# Patient Record
Sex: FEMALE | Race: WHITE | Employment: OTHER | URBAN - METROPOLITAN AREA
[De-identification: names, ages, dates, MRNs, and addresses within clinical notes are randomized per-mention and may not be internally consistent; named-entity substitution may affect disease eponyms.]

---

## 2017-01-11 ENCOUNTER — HOSPITAL ENCOUNTER (OUTPATIENT)
Dept: WOMENS IMAGING | Age: 62
Discharge: HOME | End: 2017-01-11
Payer: MEDICARE

## 2017-01-11 DIAGNOSIS — Z12.31 SCREENING MAMMOGRAM, ENCOUNTER FOR: ICD-10-CM

## 2017-01-11 PROCEDURE — G0202 SCR MAMMO BI INCL CAD: HCPCS

## 2017-02-14 ENCOUNTER — OFFICE VISIT (OUTPATIENT)
Dept: PRIMARY CARE CLINIC | Age: 62
End: 2017-02-14

## 2017-02-14 VITALS
WEIGHT: 211 LBS | BODY MASS INDEX: 37.39 KG/M2 | RESPIRATION RATE: 16 BRPM | HEART RATE: 93 BPM | SYSTOLIC BLOOD PRESSURE: 120 MMHG | OXYGEN SATURATION: 95 % | TEMPERATURE: 97.6 F | HEIGHT: 63 IN | DIASTOLIC BLOOD PRESSURE: 78 MMHG

## 2017-02-14 DIAGNOSIS — M47.816 SPONDYLOSIS OF LUMBAR REGION WITHOUT MYELOPATHY OR RADICULOPATHY: ICD-10-CM

## 2017-02-14 DIAGNOSIS — M54.50 LOW BACK PAIN RADIATING TO RIGHT LEG: ICD-10-CM

## 2017-02-14 DIAGNOSIS — M79.604 LOW BACK PAIN RADIATING TO RIGHT LEG: ICD-10-CM

## 2017-02-14 DIAGNOSIS — J41.0 SIMPLE CHRONIC BRONCHITIS (HCC): ICD-10-CM

## 2017-02-14 DIAGNOSIS — H61.21 IMPACTED CERUMEN OF RIGHT EAR: Primary | ICD-10-CM

## 2017-02-14 PROCEDURE — G8926 SPIRO NO PERF OR DOC: HCPCS | Performed by: INTERNAL MEDICINE

## 2017-02-14 PROCEDURE — G8599 NO ASA/ANTIPLAT THER USE RNG: HCPCS | Performed by: INTERNAL MEDICINE

## 2017-02-14 PROCEDURE — G8484 FLU IMMUNIZE NO ADMIN: HCPCS | Performed by: INTERNAL MEDICINE

## 2017-02-14 PROCEDURE — 99214 OFFICE O/P EST MOD 30 MIN: CPT | Performed by: INTERNAL MEDICINE

## 2017-02-14 PROCEDURE — G8417 CALC BMI ABV UP PARAM F/U: HCPCS | Performed by: INTERNAL MEDICINE

## 2017-02-14 PROCEDURE — 3017F COLORECTAL CA SCREEN DOC REV: CPT | Performed by: INTERNAL MEDICINE

## 2017-02-14 PROCEDURE — 4004F PT TOBACCO SCREEN RCVD TLK: CPT | Performed by: INTERNAL MEDICINE

## 2017-02-14 PROCEDURE — G8427 DOCREV CUR MEDS BY ELIG CLIN: HCPCS | Performed by: INTERNAL MEDICINE

## 2017-02-14 PROCEDURE — 3023F SPIROM DOC REV: CPT | Performed by: INTERNAL MEDICINE

## 2017-02-14 PROCEDURE — 3014F SCREEN MAMMO DOC REV: CPT | Performed by: INTERNAL MEDICINE

## 2017-02-14 RX ORDER — OXYCODONE AND ACETAMINOPHEN 7.5; 325 MG/1; MG/1
1 TABLET ORAL 2 TIMES DAILY PRN
Qty: 40 TABLET | Refills: 0 | Status: SHIPPED | OUTPATIENT
Start: 2017-02-14 | End: 2018-09-27 | Stop reason: ALTCHOICE

## 2017-02-14 ASSESSMENT — PATIENT HEALTH QUESTIONNAIRE - PHQ9
1. LITTLE INTEREST OR PLEASURE IN DOING THINGS: 0
2. FEELING DOWN, DEPRESSED OR HOPELESS: 0
SUM OF ALL RESPONSES TO PHQ9 QUESTIONS 1 & 2: 0
SUM OF ALL RESPONSES TO PHQ QUESTIONS 1-9: 0

## 2017-02-14 ASSESSMENT — ENCOUNTER SYMPTOMS
BLOOD IN STOOL: 0
PHOTOPHOBIA: 0
APNEA: 0
BACK PAIN: 1
ABDOMINAL DISTENTION: 0
ABDOMINAL PAIN: 0
CHOKING: 0
FACIAL SWELLING: 0

## 2017-03-13 ENCOUNTER — TELEPHONE (OUTPATIENT)
Dept: INTERNAL MEDICINE | Age: 62
End: 2017-03-13

## 2017-03-13 RX ORDER — AZITHROMYCIN 250 MG/1
TABLET, FILM COATED ORAL
Qty: 1 PACKET | Refills: 0 | Status: SHIPPED | OUTPATIENT
Start: 2017-03-13 | End: 2017-03-23

## 2017-03-17 ENCOUNTER — TELEPHONE (OUTPATIENT)
Dept: PRIMARY CARE CLINIC | Age: 62
End: 2017-03-17

## 2017-03-17 RX ORDER — GUAIFENESIN DEXTROMETHORPHAN HYDROBROMIDE ORAL SOLUTION 10; 100 MG/5ML; MG/5ML
10 SOLUTION ORAL EVERY 4 HOURS PRN
Qty: 240 ML | Refills: 0 | Status: SHIPPED | OUTPATIENT
Start: 2017-03-17 | End: 2017-09-11 | Stop reason: CLARIF

## 2017-06-08 ENCOUNTER — HOSPITAL ENCOUNTER (OUTPATIENT)
Dept: GENERAL RADIOLOGY | Age: 62
Discharge: HOME | End: 2017-06-08
Payer: MEDICARE

## 2017-06-08 ENCOUNTER — HOSPITAL ENCOUNTER (OUTPATIENT)
Dept: MRI IMAGING | Age: 62
Discharge: HOME | End: 2017-06-08
Payer: MEDICARE

## 2017-06-08 DIAGNOSIS — I10 ESSENTIAL HYPERTENSION: ICD-10-CM

## 2017-06-08 DIAGNOSIS — R52 PAIN: ICD-10-CM

## 2017-06-08 PROCEDURE — 72050 X-RAY EXAM NECK SPINE 4/5VWS: CPT

## 2017-06-08 PROCEDURE — 72148 MRI LUMBAR SPINE W/O DYE: CPT

## 2017-06-08 RX ORDER — LISINOPRIL 5 MG/1
TABLET ORAL
Qty: 90 TABLET | Refills: 1 | Status: SHIPPED | OUTPATIENT
Start: 2017-06-08 | End: 2017-11-08 | Stop reason: SDUPTHER

## 2017-07-05 DIAGNOSIS — E11.9 CONTROLLED TYPE 2 DIABETES MELLITUS WITHOUT COMPLICATION, WITH LONG-TERM CURRENT USE OF INSULIN (HCC): ICD-10-CM

## 2017-07-05 DIAGNOSIS — Z79.4 CONTROLLED TYPE 2 DIABETES MELLITUS WITHOUT COMPLICATION, WITH LONG-TERM CURRENT USE OF INSULIN (HCC): ICD-10-CM

## 2017-08-15 ENCOUNTER — CARE COORDINATION (OUTPATIENT)
Dept: CARE COORDINATION | Age: 62
End: 2017-08-15

## 2017-08-16 ENCOUNTER — CARE COORDINATION (OUTPATIENT)
Dept: CARE COORDINATION | Age: 62
End: 2017-08-16

## 2017-08-17 ENCOUNTER — CARE COORDINATION (OUTPATIENT)
Dept: CARE COORDINATION | Age: 62
End: 2017-08-17

## 2017-08-22 ENCOUNTER — CARE COORDINATION (OUTPATIENT)
Dept: CARE COORDINATION | Age: 62
End: 2017-08-22

## 2017-08-22 ASSESSMENT — ENCOUNTER SYMPTOMS: DYSPNEA ASSOCIATED WITH: EXERTION

## 2017-09-06 ENCOUNTER — CARE COORDINATION (OUTPATIENT)
Dept: CARE COORDINATION | Age: 62
End: 2017-09-06

## 2017-09-11 ENCOUNTER — CARE COORDINATOR VISIT (OUTPATIENT)
Dept: CARE COORDINATION | Age: 62
End: 2017-09-11

## 2017-09-11 ENCOUNTER — OFFICE VISIT (OUTPATIENT)
Dept: PRIMARY CARE CLINIC | Age: 62
End: 2017-09-11

## 2017-09-11 VITALS
RESPIRATION RATE: 18 BRPM | SYSTOLIC BLOOD PRESSURE: 138 MMHG | HEIGHT: 63 IN | TEMPERATURE: 97.3 F | WEIGHT: 218.6 LBS | DIASTOLIC BLOOD PRESSURE: 76 MMHG | OXYGEN SATURATION: 88 % | HEART RATE: 104 BPM | BODY MASS INDEX: 38.73 KG/M2

## 2017-09-11 DIAGNOSIS — E78.00 PURE HYPERCHOLESTEROLEMIA: ICD-10-CM

## 2017-09-11 DIAGNOSIS — Z11.59 ENCOUNTER FOR HEPATITIS C SCREENING TEST FOR LOW RISK PATIENT: ICD-10-CM

## 2017-09-11 DIAGNOSIS — E11.9 CONTROLLED TYPE 2 DIABETES MELLITUS WITHOUT COMPLICATION, WITH LONG-TERM CURRENT USE OF INSULIN (HCC): ICD-10-CM

## 2017-09-11 DIAGNOSIS — E11.9 TYPE 2 DIABETES MELLITUS WITHOUT COMPLICATION, WITHOUT LONG-TERM CURRENT USE OF INSULIN (HCC): ICD-10-CM

## 2017-09-11 DIAGNOSIS — E53.8 VITAMIN B 12 DEFICIENCY: ICD-10-CM

## 2017-09-11 DIAGNOSIS — K21.9 GASTROESOPHAGEAL REFLUX DISEASE WITHOUT ESOPHAGITIS: ICD-10-CM

## 2017-09-11 DIAGNOSIS — R42 DIZZY: ICD-10-CM

## 2017-09-11 DIAGNOSIS — E11.9 TYPE 2 DIABETES MELLITUS WITHOUT COMPLICATION, WITHOUT LONG-TERM CURRENT USE OF INSULIN (HCC): Primary | ICD-10-CM

## 2017-09-11 DIAGNOSIS — E03.9 HYPOTHYROIDISM, UNSPECIFIED TYPE: ICD-10-CM

## 2017-09-11 DIAGNOSIS — R00.2 PALPITATIONS: ICD-10-CM

## 2017-09-11 DIAGNOSIS — I65.23 CAROTID STENOSIS, BILATERAL: ICD-10-CM

## 2017-09-11 DIAGNOSIS — Z79.4 CONTROLLED TYPE 2 DIABETES MELLITUS WITHOUT COMPLICATION, WITH LONG-TERM CURRENT USE OF INSULIN (HCC): ICD-10-CM

## 2017-09-11 DIAGNOSIS — Z11.4 SCREENING FOR HIV (HUMAN IMMUNODEFICIENCY VIRUS): ICD-10-CM

## 2017-09-11 LAB
ALBUMIN SERPL-MCNC: 4 G/DL (ref 3.9–4.9)
ALP BLD-CCNC: 79 U/L (ref 40–130)
ALT SERPL-CCNC: 29 U/L (ref 0–33)
ANION GAP SERPL CALCULATED.3IONS-SCNC: 15 MEQ/L (ref 7–13)
AST SERPL-CCNC: 20 U/L (ref 0–35)
BASOPHILS ABSOLUTE: 0.1 K/UL (ref 0–0.2)
BASOPHILS RELATIVE PERCENT: 1 %
BILIRUB SERPL-MCNC: 0.1 MG/DL (ref 0–1.2)
BUN BLDV-MCNC: 15 MG/DL (ref 8–23)
CALCIUM SERPL-MCNC: 9.2 MG/DL (ref 8.6–10.2)
CHLORIDE BLD-SCNC: 98 MEQ/L (ref 98–107)
CHOLESTEROL, TOTAL: 306 MG/DL (ref 0–199)
CO2: 28 MEQ/L (ref 22–29)
CREAT SERPL-MCNC: 0.72 MG/DL (ref 0.5–0.9)
CREATININE URINE: 72.5 MG/DL
EOSINOPHILS ABSOLUTE: 0.1 K/UL (ref 0–0.7)
EOSINOPHILS RELATIVE PERCENT: 1.5 %
GFR AFRICAN AMERICAN: >60
GFR NON-AFRICAN AMERICAN: >60
GLOBULIN: 3.1 G/DL (ref 2.3–3.5)
GLUCOSE BLD-MCNC: 252 MG/DL (ref 74–109)
HBA1C MFR BLD: 9.1 % (ref 4.8–5.9)
HCT VFR BLD CALC: 50 % (ref 37–47)
HDLC SERPL-MCNC: 48 MG/DL (ref 40–59)
HEMOGLOBIN: 16.2 G/DL (ref 12–16)
LDL CHOLESTEROL CALCULATED: 185 MG/DL (ref 0–129)
LYMPHOCYTES ABSOLUTE: 3.3 K/UL (ref 1–4.8)
LYMPHOCYTES RELATIVE PERCENT: 39 %
MCH RBC QN AUTO: 31.1 PG (ref 27–31.3)
MCHC RBC AUTO-ENTMCNC: 32.3 % (ref 33–37)
MCV RBC AUTO: 96.2 FL (ref 82–100)
MICROALBUMIN UR-MCNC: 1.8 MG/DL
MICROALBUMIN/CREAT UR-RTO: 24.8 MG/G (ref 0–30)
MONOCYTES ABSOLUTE: 0.3 K/UL (ref 0.2–0.8)
MONOCYTES RELATIVE PERCENT: 3.9 %
NEUTROPHILS ABSOLUTE: 4.6 K/UL (ref 1.4–6.5)
NEUTROPHILS RELATIVE PERCENT: 54.6 %
PDW BLD-RTO: 13.6 % (ref 11.5–14.5)
PLATELET # BLD: 230 K/UL (ref 130–400)
POTASSIUM SERPL-SCNC: 4.7 MEQ/L (ref 3.5–5.1)
RBC # BLD: 5.2 M/UL (ref 4.2–5.4)
SODIUM BLD-SCNC: 141 MEQ/L (ref 132–144)
TOTAL PROTEIN: 7.1 G/DL (ref 6.4–8.1)
TRIGL SERPL-MCNC: 366 MG/DL (ref 0–200)
TSH SERPL DL<=0.05 MIU/L-ACNC: 2.24 UIU/ML (ref 0.27–4.2)
VITAMIN B-12: 312 PG/ML (ref 211–946)
WBC # BLD: 8.3 K/UL (ref 4.8–10.8)

## 2017-09-11 PROCEDURE — G8427 DOCREV CUR MEDS BY ELIG CLIN: HCPCS | Performed by: INTERNAL MEDICINE

## 2017-09-11 PROCEDURE — 3014F SCREEN MAMMO DOC REV: CPT | Performed by: INTERNAL MEDICINE

## 2017-09-11 PROCEDURE — 3017F COLORECTAL CA SCREEN DOC REV: CPT | Performed by: INTERNAL MEDICINE

## 2017-09-11 PROCEDURE — 1036F TOBACCO NON-USER: CPT | Performed by: INTERNAL MEDICINE

## 2017-09-11 PROCEDURE — G8417 CALC BMI ABV UP PARAM F/U: HCPCS | Performed by: INTERNAL MEDICINE

## 2017-09-11 PROCEDURE — 93000 ELECTROCARDIOGRAM COMPLETE: CPT | Performed by: INTERNAL MEDICINE

## 2017-09-11 PROCEDURE — G8599 NO ASA/ANTIPLAT THER USE RNG: HCPCS | Performed by: INTERNAL MEDICINE

## 2017-09-11 PROCEDURE — 3046F HEMOGLOBIN A1C LEVEL >9.0%: CPT | Performed by: INTERNAL MEDICINE

## 2017-09-11 PROCEDURE — 99214 OFFICE O/P EST MOD 30 MIN: CPT | Performed by: INTERNAL MEDICINE

## 2017-09-11 RX ORDER — DIAZEPAM 5 MG/1
TABLET ORAL
Refills: 0 | COMMUNITY
Start: 2017-08-01 | End: 2018-09-27 | Stop reason: ALTCHOICE

## 2017-09-11 RX ORDER — FAMOTIDINE 40 MG/1
40 TABLET, FILM COATED ORAL EVERY EVENING
Qty: 30 TABLET | Refills: 11 | Status: SHIPPED | OUTPATIENT
Start: 2017-09-11 | End: 2017-09-11 | Stop reason: SDUPTHER

## 2017-09-11 RX ORDER — DICLOFENAC POTASSIUM 50 MG/1
TABLET, FILM COATED ORAL
Refills: 0 | COMMUNITY
Start: 2017-09-06 | End: 2018-02-06 | Stop reason: SDUPTHER

## 2017-09-11 RX ORDER — ATORVASTATIN CALCIUM 10 MG/1
TABLET, FILM COATED ORAL
Qty: 90 TABLET | Refills: 5 | Status: SHIPPED | OUTPATIENT
Start: 2017-09-11 | End: 2017-11-22 | Stop reason: SDUPTHER

## 2017-09-11 RX ORDER — TOPIRAMATE 50 MG/1
TABLET, FILM COATED ORAL
Refills: 0 | COMMUNITY
Start: 2017-07-02 | End: 2017-09-11 | Stop reason: CLARIF

## 2017-09-11 RX ORDER — ATORVASTATIN CALCIUM 10 MG/1
10 TABLET, FILM COATED ORAL DAILY
Qty: 30 TABLET | Refills: 5 | Status: SHIPPED | OUTPATIENT
Start: 2017-09-11 | End: 2017-09-11 | Stop reason: SDUPTHER

## 2017-09-11 RX ORDER — FAMOTIDINE 40 MG/1
TABLET, FILM COATED ORAL
Qty: 90 TABLET | Refills: 11 | Status: SHIPPED | OUTPATIENT
Start: 2017-09-11 | End: 2018-09-27 | Stop reason: SDUPTHER

## 2017-09-11 RX ORDER — MELOXICAM 15 MG/1
TABLET ORAL
Refills: 0 | COMMUNITY
Start: 2017-07-02 | End: 2017-09-11 | Stop reason: CLARIF

## 2017-09-11 ASSESSMENT — ENCOUNTER SYMPTOMS
HEARTBURN: 1
BACK PAIN: 1
ABDOMINAL DISTENTION: 0
ABDOMINAL PAIN: 0
BLOOD IN STOOL: 0
APNEA: 0
FACIAL SWELLING: 0
PHOTOPHOBIA: 0
BLURRED VISION: 0
CHOKING: 0

## 2017-09-13 DIAGNOSIS — Z79.4 CONTROLLED TYPE 2 DIABETES MELLITUS WITHOUT COMPLICATION, WITH LONG-TERM CURRENT USE OF INSULIN (HCC): ICD-10-CM

## 2017-09-13 DIAGNOSIS — E11.9 CONTROLLED TYPE 2 DIABETES MELLITUS WITHOUT COMPLICATION, WITH LONG-TERM CURRENT USE OF INSULIN (HCC): ICD-10-CM

## 2017-09-13 DIAGNOSIS — E11.00 UNCONTROLLED TYPE 2 DIABETES MELLITUS WITH HYPEROSMOLARITY WITHOUT COMA, WITHOUT LONG-TERM CURRENT USE OF INSULIN (HCC): Primary | ICD-10-CM

## 2017-09-13 RX ORDER — GLIMEPIRIDE 2 MG/1
2 TABLET ORAL EVERY MORNING
Qty: 30 TABLET | Refills: 3 | Status: SHIPPED | OUTPATIENT
Start: 2017-09-13 | End: 2017-09-13 | Stop reason: SDUPTHER

## 2017-09-14 ENCOUNTER — TELEPHONE (OUTPATIENT)
Dept: PRIMARY CARE CLINIC | Age: 62
End: 2017-09-14

## 2017-09-14 RX ORDER — GLIMEPIRIDE 2 MG/1
TABLET ORAL
Qty: 90 TABLET | Refills: 1 | Status: SHIPPED | OUTPATIENT
Start: 2017-09-14 | End: 2017-11-15 | Stop reason: SDUPTHER

## 2017-09-22 ENCOUNTER — HOSPITAL ENCOUNTER (OUTPATIENT)
Dept: ULTRASOUND IMAGING | Age: 62
Discharge: HOME | End: 2017-09-22
Payer: MEDICARE

## 2017-09-22 DIAGNOSIS — I65.23 CAROTID STENOSIS, BILATERAL: ICD-10-CM

## 2017-09-22 PROCEDURE — 93880 EXTRACRANIAL BILAT STUDY: CPT

## 2017-09-25 ENCOUNTER — CARE COORDINATION (OUTPATIENT)
Dept: CARE COORDINATION | Age: 62
End: 2017-09-25

## 2017-10-04 ENCOUNTER — CARE COORDINATION (OUTPATIENT)
Dept: CARE COORDINATION | Age: 62
End: 2017-10-04

## 2017-10-12 PROBLEM — R00.2 HEART PALPITATIONS: Status: ACTIVE | Noted: 2017-10-12

## 2017-10-16 ENCOUNTER — CARE COORDINATION (OUTPATIENT)
Dept: CARE COORDINATION | Age: 62
End: 2017-10-16

## 2017-10-17 ENCOUNTER — CARE COORDINATION (OUTPATIENT)
Dept: CARE COORDINATION | Age: 62
End: 2017-10-17

## 2017-10-17 ASSESSMENT — ENCOUNTER SYMPTOMS: DYSPNEA ASSOCIATED WITH: EXERTION

## 2017-10-17 NOTE — CARE COORDINATION
ACC: Celso Chavarria RN  CM Risk Score: 3  Bao Mortality Risk Score:    Ambulatory Care Coordination Note  10/17/2017  CM Risk Score: 3  Bao Mortality Risk Score:      ACC: Celso Chavarria RN    Summary Note: call to [address] [place] [address]. She reports she becomes short of breath with activity and is aware to stop activity until breathing is normal. No increase in cough or sputum. She was not able to obtain nebulizer due to it has been 3 years since her last one. Her blood sugar has ranged 89-92 since receiving bgm. She reports she has received gas and electric shut off notices. MSW notif. She is not able to attend dr gutierrez due to unable to afford the medical bills. Diabetes Assessment    Medic Alert ID:  No  Meal Planning:  Avoidance of concentrated sweets   How often do you test your blood sugar?:  No Testing   Do you have barriers with adherence to non-pharmacologic self-management interventions? (Nutrition/Exercise/Self-Monitoring):  Yes   Have you ever had to go to the ED for symptoms of low blood sugar?:  No       No patient-reported symptoms       and   COPD Assessment    Does the patient understand envrionmental exposure?:  Yes  Is the patient able to verbalize Rescue vs. Long Acting medications?:  Yes  Does the patient have a nebulizer?:  Yes  Does the patient use a space with inhaled medications?:  No     Shortness of breath (worse than baseline)         Symptoms:   None:  Yes      Symptom course:  no change  Breathlessness:  exertion  Increase use of rapid acting/rescue inhaled medications?:  No  Change in chronic cough?:  No/At Baseline  Change in sputum?:  No/At Baseline  Sputum characteristics:  Unable to Specify  Self Monitoring - SaO2:  No  Have you had a recent diagnosis of pneumonia either by PCP or at a hospital?:  No             Care Coordination Interventions    Program Enrollment:  Rising Risk  Referral from Primary Care Provider:  No  Suggested Interventions and Community Resources  Fall Risk Prevention:   In Process  Social Work:  Completed  Zone Management Tools:  Completed         Goals Addressed             Most Recent     Conditions and Symptoms   On track (10/16/2017)             I will follow my Zone Management tool to seek urgent or emergent care. I will notify my provider of any symptoms that indicate a worsening of my condition. Barriers: financial  Plan for overcoming my barriers: will consult msw  Confidence: 6/10  Anticipated Goal Completion Date: 10/30/2017              Prior to Admission medications    Medication Sig Start Date End Date Taking? Authorizing Provider   glimepiride (AMARYL) 2 MG tablet TAKE 1 TABLET BY MOUTH EVERY MORNING 9/14/17   Elias Montalvo MD   metFORMIN (GLUCOPHAGE) 1000 MG tablet Take 1 tablet by mouth daily (with breakfast) 9/13/17   Elias Montalvo MD   diazepam (VALIUM) 5 MG tablet TK 1 T PO ONCE UTD 8/1/17   Historical Provider, MD   diclofenac (CATAFLAM) 50 MG tablet TK 1 T PO BID WF PRN P 9/6/17   Historical Provider, MD   famotidine (PEPCID) 40 MG tablet TAKE 1 TABLET BY MOUTH EVERY EVENING 9/11/17   Elias Montalvo MD   atorvastatin (LIPITOR) 10 MG tablet TAKE 1 TABLET BY MOUTH DAILY 9/11/17   Elias Montalvo MD   lisinopril (PRINIVIL;ZESTRIL) 5 MG tablet TAKE 1 TABLET BY MOUTH DAILY 6/8/17   Elias Montalvo MD   oxyCODONE-acetaminophen (PERCOCET) 7.5-325 MG per tablet Take 1 tablet by mouth 2 times daily as needed for Pain  Valid 1/19/16-2/18/16. 2/14/17   Elias Montalvo MD   DULoxetine (CYMBALTA) 60 MG extended release capsule Take 1 capsule by mouth every evening 10/20/16 11/19/16  Elias Montalvo MD   Blood Glucose Monitoring Suppl (ONE TOUCH ULTRA 2) W/DEVICE KIT 1 kit by Does not apply route daily as needed 4/25/15   Chidi Chavira MD   LANCETS MICRO THIN 33G [facility]  4/20/15   Historical Provider, MD   ONE TOUCH ULTRA TEST strip  4/15/15   Historical Provider, MD   OXYGEN Inhale 2 L into the lungs daily.  [facility]    Historical Provider, MD       Future Appointments  Date Time Provider Kendy Dias   10/19/2017 9:00 AM Ronnie Mckinney MD [facility]

## 2017-10-31 ENCOUNTER — CARE COORDINATION (OUTPATIENT)
Dept: CARE COORDINATION | Age: 62
End: 2017-10-31

## 2017-10-31 NOTE — CARE COORDINATION
[facility]  Telephone call to patient and left voicemail of nature of call and to return phone call.

## 2017-10-31 NOTE — CARE COORDINATION
Ambulatory Care Coordination Note  10/31/2017  CM Risk Score: 3  Boa Mortality Risk Score:      ACC: Karen Rasmussen, RN    Summary Note: call to [address] [place] [address] to discuss health coaching. She reports she received notice today that her electricity will be shut off. Her phone has been turned on today. Discussed MSW attempting to contact her. She states she will call MSW back. She has not had decline in breathing. Her blood sugar has been ranging in the 100's. Diabetes Assessment    Medic Alert ID:  No  Meal Planning:  Avoidance of concentrated sweets   How often do you test your blood sugar?:  Daily   Do you have barriers with adherence to non-pharmacologic self-management interventions? (Nutrition/Exercise/Self-Monitoring):  Yes   Have you ever had to go to the ED for symptoms of low blood sugar?:  No           and   COPD Assessment    Does the patient understand envrionmental exposure?:  Yes  Is the patient able to verbalize Rescue vs. Long Acting medications?:  Yes  Does the patient have a nebulizer?:  Yes  Does the patient use a space with inhaled medications?:  No            Symptoms:                 Care Coordination Interventions    Program Enrollment:  Rising Risk  Referral from Primary Care Provider:  No  Suggested Interventions and Community Resources  Fall Risk Prevention: In Process  Social Work:  Completed  Zone Management Tools:  Completed         Goals Addressed             Most Recent     Conditions and Symptoms   On track (10/31/2017)             I will follow my Zone Management tool to seek urgent or emergent care. I will notify my provider of any symptoms that indicate a worsening of my condition. Barriers: financial  Plan for overcoming my barriers: will consult msw  Confidence: 6/10  Anticipated Goal Completion Date: 11/30/2017              Prior to Admission medications    Medication Sig Start Date End Date Taking?  Authorizing Provider   glimepiride (AMARYL) 2 MG tablet TAKE 1 TABLET BY MOUTH EVERY

## 2017-11-02 ENCOUNTER — TELEPHONE (OUTPATIENT)
Dept: PRIMARY CARE CLINIC | Age: 62
End: 2017-11-02

## 2017-11-02 ENCOUNTER — CARE COORDINATION (OUTPATIENT)
Dept: CARE COORDINATION | Age: 62
End: 2017-11-02

## 2017-11-02 NOTE — CARE COORDINATION
[address]  Telephone call with patient who indicated she has shut off notices for gas and electric. She owes 205.00 for gas and 266 for electricity. Patient is oxygen dependent and has not used medical certificate in past to keep oxygen on. Patient felt comfortable calling electric company and asking for medical certificate be sent to her MD. Patient has not used any community resources for assistance with utilities. Discussed plan to search community resources and follow-up with her on results.

## 2017-11-08 DIAGNOSIS — I10 ESSENTIAL HYPERTENSION: ICD-10-CM

## 2017-11-08 NOTE — TELEPHONE ENCOUNTER
Patient was last seen on 9/11/17. Script was last filled on 6/8/17. Medication request is pending.   Please approve or deny this request.

## 2017-11-09 RX ORDER — LISINOPRIL 5 MG/1
TABLET ORAL
Qty: 90 TABLET | Refills: 1 | Status: SHIPPED | OUTPATIENT
Start: 2017-11-09 | End: 2018-04-13 | Stop reason: SDUPTHER

## 2017-11-14 ENCOUNTER — CARE COORDINATION (OUTPATIENT)
Dept: CARE COORDINATION | Age: 62
End: 2017-11-14

## 2017-11-15 ENCOUNTER — CARE COORDINATOR VISIT (OUTPATIENT)
Dept: CARE COORDINATION | Age: 62
End: 2017-11-15

## 2017-11-15 ENCOUNTER — OFFICE VISIT (OUTPATIENT)
Dept: PRIMARY CARE CLINIC | Age: 62
End: 2017-11-15

## 2017-11-15 VITALS
DIASTOLIC BLOOD PRESSURE: 80 MMHG | RESPIRATION RATE: 16 BRPM | TEMPERATURE: 97.3 F | BODY MASS INDEX: 38.52 KG/M2 | WEIGHT: 217.4 LBS | HEART RATE: 104 BPM | OXYGEN SATURATION: 93 % | SYSTOLIC BLOOD PRESSURE: 124 MMHG | HEIGHT: 63 IN

## 2017-11-15 DIAGNOSIS — E11.9 TYPE 2 DIABETES MELLITUS WITHOUT COMPLICATION, WITHOUT LONG-TERM CURRENT USE OF INSULIN (HCC): Primary | ICD-10-CM

## 2017-11-15 DIAGNOSIS — Z11.59 ENCOUNTER FOR HEPATITIS C SCREENING TEST FOR LOW RISK PATIENT: ICD-10-CM

## 2017-11-15 DIAGNOSIS — E78.00 PURE HYPERCHOLESTEROLEMIA: ICD-10-CM

## 2017-11-15 DIAGNOSIS — J20.9 ACUTE BRONCHITIS, UNSPECIFIED ORGANISM: ICD-10-CM

## 2017-11-15 DIAGNOSIS — Z11.4 ENCOUNTER FOR SCREENING FOR HIV: ICD-10-CM

## 2017-11-15 DIAGNOSIS — J96.01 ACUTE RESPIRATORY FAILURE WITH HYPOXIA (HCC): ICD-10-CM

## 2017-11-15 LAB
ALBUMIN SERPL-MCNC: 4.1 G/DL (ref 3.9–4.9)
ALP BLD-CCNC: 65 U/L (ref 40–130)
ALT SERPL-CCNC: 26 U/L (ref 0–33)
ANION GAP SERPL CALCULATED.3IONS-SCNC: 14 MEQ/L (ref 7–13)
AST SERPL-CCNC: 22 U/L (ref 0–35)
BILIRUB SERPL-MCNC: 0.4 MG/DL (ref 0–1.2)
BUN BLDV-MCNC: 9 MG/DL (ref 8–23)
CALCIUM SERPL-MCNC: 9.2 MG/DL (ref 8.6–10.2)
CHLORIDE BLD-SCNC: 96 MEQ/L (ref 98–107)
CHOLESTEROL, TOTAL: 270 MG/DL (ref 0–199)
CO2: 30 MEQ/L (ref 22–29)
CREAT SERPL-MCNC: 0.59 MG/DL (ref 0.5–0.9)
GFR AFRICAN AMERICAN: >60
GFR NON-AFRICAN AMERICAN: >60
GLOBULIN: 3 G/DL (ref 2.3–3.5)
GLUCOSE BLD-MCNC: 174 MG/DL (ref 74–109)
GLUCOSE BLD-MCNC: 184 MG/DL
HBA1C MFR BLD: 9.3 %
HDLC SERPL-MCNC: 54 MG/DL (ref 40–59)
HEPATITIS C ANTIBODY INTERPRETATION: NORMAL
LDL CHOLESTEROL CALCULATED: 187 MG/DL (ref 0–129)
POTASSIUM SERPL-SCNC: 4.4 MEQ/L (ref 3.5–5.1)
SODIUM BLD-SCNC: 140 MEQ/L (ref 132–144)
TOTAL PROTEIN: 7.1 G/DL (ref 6.4–8.1)
TRIGL SERPL-MCNC: 145 MG/DL (ref 0–200)

## 2017-11-15 PROCEDURE — 99214 OFFICE O/P EST MOD 30 MIN: CPT | Performed by: INTERNAL MEDICINE

## 2017-11-15 PROCEDURE — G8599 NO ASA/ANTIPLAT THER USE RNG: HCPCS | Performed by: INTERNAL MEDICINE

## 2017-11-15 PROCEDURE — G8484 FLU IMMUNIZE NO ADMIN: HCPCS | Performed by: INTERNAL MEDICINE

## 2017-11-15 PROCEDURE — 1036F TOBACCO NON-USER: CPT | Performed by: INTERNAL MEDICINE

## 2017-11-15 PROCEDURE — 3017F COLORECTAL CA SCREEN DOC REV: CPT | Performed by: INTERNAL MEDICINE

## 2017-11-15 PROCEDURE — 83036 HEMOGLOBIN GLYCOSYLATED A1C: CPT | Performed by: INTERNAL MEDICINE

## 2017-11-15 PROCEDURE — 3046F HEMOGLOBIN A1C LEVEL >9.0%: CPT | Performed by: INTERNAL MEDICINE

## 2017-11-15 PROCEDURE — G8427 DOCREV CUR MEDS BY ELIG CLIN: HCPCS | Performed by: INTERNAL MEDICINE

## 2017-11-15 PROCEDURE — 3014F SCREEN MAMMO DOC REV: CPT | Performed by: INTERNAL MEDICINE

## 2017-11-15 PROCEDURE — G8417 CALC BMI ABV UP PARAM F/U: HCPCS | Performed by: INTERNAL MEDICINE

## 2017-11-15 PROCEDURE — 82962 GLUCOSE BLOOD TEST: CPT | Performed by: INTERNAL MEDICINE

## 2017-11-15 RX ORDER — GLIMEPIRIDE 2 MG/1
2 TABLET ORAL 2 TIMES DAILY
Qty: 180 TABLET | Refills: 1 | Status: SHIPPED | OUTPATIENT
Start: 2017-11-15 | End: 2018-02-06 | Stop reason: SDUPTHER

## 2017-11-15 RX ORDER — LEVOFLOXACIN 500 MG/1
500 TABLET, FILM COATED ORAL DAILY
Qty: 10 TABLET | Refills: 0 | Status: SHIPPED | OUTPATIENT
Start: 2017-11-15 | End: 2017-11-25

## 2017-11-15 ASSESSMENT — ENCOUNTER SYMPTOMS
CHOKING: 0
SINUS PAIN: 1
RHINORRHEA: 1
DYSPNEA ASSOCIATED WITH: MINIMAL EXERTION
ABDOMINAL DISTENTION: 0
COUGH: 1
FACIAL SWELLING: 0
PHOTOPHOBIA: 0
APNEA: 0
BLURRED VISION: 0
BLOOD IN STOOL: 0

## 2017-11-15 NOTE — PROGRESS NOTES
Pulmonary/Chest: Effort normal and breath sounds normal. No respiratory distress. She has no wheezes. She has no rales. Abdominal: She exhibits no distension. Musculoskeletal: Normal range of motion. Neurological: She is alert. Skin: Skin is warm. Psychiatric: She has a normal mood and affect. Assessment/Plan  Zain Ng was seen today for uri and diabetes. Diagnoses and all orders for this visit:    Type 2 diabetes mellitus without complication, without long-term current use of insulin (HCC)  -     POCT glycosylated hemoglobin (Hb A1C)  -     POCT Glucose  -     glimepiride (AMARYL) 2 MG tablet; Take 1 tablet by mouth 2 times daily    Acute bronchitis, unspecified organism  -     levofloxacin (LEVAQUIN) 500 MG tablet; Take 1 tablet by mouth daily for 10 days    Encounter for screening for HIV  -     Hiv-1,-2 W/Reflex To Hiv-1 Western Blot    Encounter for hepatitis C screening test for low risk patient  -     Hepatitis C Antibody; Future    Pure hypercholesterolemia  -     Lipid Panel  -     Comprehensive Metabolic Panel; Future    Acute respiratory failure with hypoxia (HCC)    Other orders  -     loratadine-pseudoephedrine (CLARITIN-D 12 HOUR) 5-120 MG per extended release tablet; Take 1 tablet by mouth 2 times daily as needed (S)        Return if symptoms worsen or fail to improve.     Bud Martin MD

## 2017-11-15 NOTE — CARE COORDINATION
Inhale 2 L into the lungs daily. [facility]    Historical Provider, MD       No future appointments.

## 2017-11-15 NOTE — CARE COORDINATION
Call to [facility] to discuss health coaching. She reports head and chest congestion with yellow sputum for 2 days. Requests appt for eval and scheduled for tomorrow. Her gas and electric utilities are on. She has an appt with HEAP next week.

## 2017-11-17 LAB — HIV-1 AND HIV-2 ANTIBODIES: NEGATIVE

## 2017-11-20 ENCOUNTER — TELEPHONE (OUTPATIENT)
Dept: PRIMARY CARE CLINIC | Age: 62
End: 2017-11-20

## 2017-11-20 ENCOUNTER — CARE COORDINATION (OUTPATIENT)
Dept: CARE COORDINATION | Age: 62
End: 2017-11-20

## 2017-11-20 RX ORDER — DOXYCYCLINE HYCLATE 100 MG
100 TABLET ORAL 2 TIMES DAILY
Qty: 20 TABLET | Refills: 0 | Status: SHIPPED | OUTPATIENT
Start: 2017-11-20 | End: 2017-11-30

## 2017-11-20 NOTE — TELEPHONE ENCOUNTER
SHE SAID SHE HAS 3 ANTIBIOTICS LEFT AND NOW HAS EAR INFECTION IN BOTH EARS. SHE WANTS TO KNOW ID SHE SHOULD BE ON ANOTHER ANTIBIOTIC? SHE USES [facility].

## 2017-11-20 NOTE — TELEPHONE ENCOUNTER
Patient was seen last week for chest congestion and was given Levaquin. Patient spoke to [facility] today and states she is now having bilateral ear pain and has a harsh cough, per [facility]. Patient only has 3 antibiotics left. Should she have a different antibiotic, another round levaquin to continue treatment, or would you like to see patient? Please advise.

## 2017-11-21 DIAGNOSIS — E11.9 TYPE 2 DIABETES MELLITUS WITHOUT COMPLICATION, WITHOUT LONG-TERM CURRENT USE OF INSULIN (HCC): Primary | ICD-10-CM

## 2017-11-21 ASSESSMENT — ENCOUNTER SYMPTOMS: DYSPNEA ASSOCIATED WITH: MINIMAL EXERTION

## 2017-11-22 ENCOUNTER — CARE COORDINATION (OUTPATIENT)
Dept: CARE COORDINATION | Age: 62
End: 2017-11-22

## 2017-11-22 DIAGNOSIS — E78.00 PURE HYPERCHOLESTEROLEMIA: ICD-10-CM

## 2017-11-22 RX ORDER — ATORVASTATIN CALCIUM 10 MG/1
10 TABLET, FILM COATED ORAL DAILY
Qty: 90 TABLET | Refills: 1 | Status: SHIPPED | OUTPATIENT
Start: 2017-11-22 | End: 2018-04-13 | Stop reason: SDUPTHER

## 2017-11-22 NOTE — CARE COORDINATION
for 10 days 11/20/17 11/30/17  Dora Morrow MD   loratadine-pseudoephedrine (CLARITIN-D 12 HOUR) 5-120 MG per extended release tablet Take 1 tablet by mouth 2 times daily 11/20/17   Dora Morrow MD   levofloxacin [facility]) 500 MG tablet Take 1 tablet by mouth daily for 10 days 11/15/17 11/25/17  Dora Morrow MD   loratadine-pseudoephedrine (CLARITIN-D 12 HOUR) 5-120 MG per extended release tablet Take 1 tablet by mouth 2 times daily as needed (S) 11/15/17   Dora Morrow MD   glimepiride (AMARYL) 2 MG tablet Take 1 tablet by mouth 2 times daily 11/15/17   Dora Morrow MD   lisinopril (PRINIVIL;ZESTRIL) 5 MG tablet TAKE 1 TABLET BY MOUTH DAILY 11/9/17   Dora Morrow MD   metFORMIN (GLUCOPHAGE) 1000 MG tablet Take 1 tablet by mouth daily (with breakfast) 9/13/17   Dora Morrow MD   diazepam (VALIUM) 5 MG tablet TK 1 T PO ONCE UTD 8/1/17   Historical Provider, MD   diclofenac (CATAFLAM) 50 MG tablet TK 1 T PO BID WF PRN P 9/6/17   Historical Provider, MD   famotidine (PEPCID) 40 MG tablet TAKE 1 TABLET BY MOUTH EVERY EVENING 9/11/17   Dora Morrow MD   atorvastatin (LIPITOR) 10 MG tablet TAKE 1 TABLET BY MOUTH DAILY 9/11/17   Dora Morrow MD   oxyCODONE-acetaminophen (PERCOCET) 7.5-325 MG per tablet Take 1 tablet by mouth 2 times daily as needed for Pain  Valid 1/19/16-2/18/16. 2/14/17   Dora Morrow MD   DULoxetine (CYMBALTA) 60 MG extended release capsule Take 1 capsule by mouth every evening 10/20/16 11/19/16  Dora Morrow MD   Blood Glucose Monitoring Suppl (ONE TOUCH ULTRA 2) W/DEVICE KIT 1 kit by Does not apply route daily as needed 4/25/15   Reina President, MD   LANCETS MICRO THIN 33G [address]  4/20/15   Historical Provider, MD   ONE TOUCH ULTRA TEST strip  4/15/15   Historical Provider, MD   OXYGEN Inhale 2 L into the lungs daily. [facility]    Historical Provider, MD       No future appointments.

## 2017-11-22 NOTE — TELEPHONE ENCOUNTER
Pt called back and stated unfortunately she can't afford scripts at the moment and doesn't want anything else sent over to her pharmacy. Please advise.

## 2017-11-24 NOTE — CARE COORDINATION
Call to [facility] to discuss obtaining recently prescribed medication. She reports she did obtain antibiotic and is feeling better. She is not able to afford the cholesterol medication until dec 3. She is currently enrolled in the extra help program, but is not able to afford new medication.  Message to  regarding delay in obtaining medication

## 2017-12-04 ENCOUNTER — CARE COORDINATION (OUTPATIENT)
Dept: CARE COORDINATION | Age: 62
End: 2017-12-04

## 2017-12-05 RX ORDER — FLUCONAZOLE 100 MG/1
100 TABLET ORAL DAILY
Qty: 7 TABLET | Refills: 0 | Status: SHIPPED | OUTPATIENT
Start: 2017-12-05 | End: 2017-12-12 | Stop reason: SDUPTHER

## 2017-12-12 ENCOUNTER — CARE COORDINATION (OUTPATIENT)
Dept: CARE COORDINATION | Age: 62
End: 2017-12-12

## 2017-12-12 ENCOUNTER — TELEPHONE (OUTPATIENT)
Dept: PRIMARY CARE CLINIC | Age: 62
End: 2017-12-12

## 2017-12-12 RX ORDER — FLUCONAZOLE 100 MG/1
100 TABLET ORAL DAILY
Qty: 7 TABLET | Refills: 1 | Status: SHIPPED | OUTPATIENT
Start: 2017-12-12 | End: 2017-12-19

## 2017-12-12 RX ORDER — AZITHROMYCIN 250 MG/1
TABLET, FILM COATED ORAL
Qty: 1 PACKET | Refills: 0 | Status: SHIPPED | OUTPATIENT
Start: 2017-12-12 | End: 2017-12-22

## 2017-12-12 NOTE — LETTER
[address] [place] [address]      Dear Curtis Hyman,    I hope this letter finds you doing well! I just wanted to follow up with you from our last contact. I am sending you additional  Diabetes and COPD educational materials to follow up from our last discussion. I hope you find this information helpful. Please look them over and let me know if you have any questions or would like to schedule another appointment with me. You can reach me at the numbers listed below.            Sincerely,       Mason Son RN, BSN   [address],  [place]  Direct line: [phone]  Office: [phone]  Fax: [phone]

## 2017-12-13 ASSESSMENT — ENCOUNTER SYMPTOMS: DYSPNEA ASSOCIATED WITH: EXERTION

## 2017-12-13 NOTE — CARE COORDINATION
Ambulatory Care Coordination Note  12/12/2017  CM Risk Score: 3  Bao Mortality Risk Score:      ACC: Elder Pierce RN    Summary Note: call to [address] [place] [address] to discuss health coaching. She reports she is improved, but continues with \"my chest feels heavy at night\" continues coughing with clear sputum. She is short of breath with walking. Message to  for continued antibiotics per her request. Will  Mail info   Diabetes Assessment    Medic Alert ID:  No  Meal Planning:  Avoidance of concentrated sweets   How often do you test your blood sugar?:  Daily   Do you have barriers with adherence to non-pharmacologic self-management interventions? (Nutrition/Exercise/Self-Monitoring):  Yes   Have you ever had to go to the ED for symptoms of low blood sugar?:  No       No patient-reported symptoms       and   COPD Assessment    Does the patient understand envrionmental exposure?:  Yes  Is the patient able to verbalize Rescue vs. Long Acting medications?:  Yes  Does the patient have a nebulizer?:  Yes  Does the patient use a space with inhaled medications?:  No     Other symptoms causing concern         Symptoms:      Symptom course:  improving  Breathlessness:  exertion  Increase use of rapid acting/rescue inhaled medications?:  No  Change in chronic cough?:  Increased  Change in sputum?:  Increased  Sputum characteristics:  Clear  Self Monitoring - SaO2:  No  Have you had a recent diagnosis of pneumonia either by PCP or at a hospital?:  No             Care Coordination Interventions    Program Enrollment:  Rising Risk  Referral from Primary Care Provider:  No  Suggested Interventions and Community Resources  Fall Risk Prevention:  Completed  Social Work:  Completed  Zone Management Tools:  Completed         Goals Addressed             Most Recent     Conditions and Symptoms   Improving (12/12/2017)             I will follow my Zone Management tool to seek urgent or emergent care.   I will notify my provider of any symptoms that indicate a worsening of my condition. Barriers: financial  Plan for overcoming my barriers: will consult msw  Confidence: 6/10  Anticipated Goal Completion Date: 11/30/2017  11/15/2017 co congestion  11/20/2017 co ear pain and continued cough              Prior to Admission medications    Medication Sig Start Date End Date Taking? Authorizing Provider   fluconazole (DIFLUCAN) 100 MG tablet Take 1 tablet by mouth daily for 7 days 12/12/17 12/19/17  Jazmin Arora MD   azithromycin (ZITHROMAX Z-COY) 250 MG tablet Take as directed.  12/12/17 12/22/17  Jazmin Arora MD   atorvastatin (LIPITOR) 10 MG tablet Take 1 tablet by mouth daily 11/22/17   Jazmin Arora MD   loratadine-pseudoephedrine (CLARITIN-D 12 HOUR) 5-120 MG per extended release tablet Take 1 tablet by mouth 2 times daily 11/20/17   Jazmin Arora MD   loratadine-pseudoephedrine (CLARITIN-D 12 HOUR) 5-120 MG per extended release tablet Take 1 tablet by mouth 2 times daily as needed (S) 11/15/17   Jazmin Arora MD   glimepiride (AMARYL) 2 MG tablet Take 1 tablet by mouth 2 times daily 11/15/17   Jazmin Arora MD   lisinopril (PRINIVIL;ZESTRIL) 5 MG tablet TAKE 1 TABLET BY MOUTH DAILY 11/9/17   Jazmin Arora MD   metFORMIN (GLUCOPHAGE) 1000 MG tablet Take 1 tablet by mouth daily (with breakfast) 9/13/17   Jazmin Arora MD   diazepam (VALIUM) 5 MG tablet TK 1 T PO ONCE UTD 8/1/17   Historical Provider, MD   diclofenac (CATAFLAM) 50 MG tablet TK 1 T PO BID WF PRN P 9/6/17   Historical Provider, MD   famotidine (PEPCID) 40 MG tablet TAKE 1 TABLET BY MOUTH EVERY EVENING 9/11/17   Jazmin Arora MD   oxyCODONE-acetaminophen (PERCOCET) 7.5-325 MG per tablet Take 1 tablet by mouth 2 times daily as needed for Pain  Valid 1/19/16-2/18/16. 2/14/17   Jazmin Arora MD   DULoxetine (CYMBALTA) 60 MG extended release capsule Take 1 capsule by mouth every evening 10/20/16 11/19/16  Jazmin Arora MD   Blood Glucose Monitoring Suppl (ONE TOUCH ULTRA 2) W/DEVICE KIT 1 kit by [place]

## 2017-12-13 NOTE — PATIENT INSTRUCTIONS
license by [facility] ([facility]). If you have questions about a medical condition or this instruction, always ask your healthcare professional. [place] [address] any warranty or liability for your use of this information.

## 2018-01-10 ENCOUNTER — CARE COORDINATION (OUTPATIENT)
Dept: CARE COORDINATION | Age: 63
End: 2018-01-10

## 2018-01-15 ENCOUNTER — CARE COORDINATION (OUTPATIENT)
Dept: CARE COORDINATION | Age: 63
End: 2018-01-15

## 2018-01-15 NOTE — LETTER
[address] [place] [address]      Dear Jose Worley,    I hope this letter finds you doing well! I just wanted to follow up with you from our last contact. I am sending you additional diabetes educational materials to follow up from our last discussion. I hope you find this information helpful. Please look them over and let me know if you have any questions or would like to schedule another appointment with me. You can reach me at the numbers listed below.            Sincerely,       Lemuel Barger RN, BSN   [address],  [place] [place]  Direct line: [phone]  Office: [phone]  Fax: [phone]

## 2018-01-16 ASSESSMENT — ENCOUNTER SYMPTOMS: DYSPNEA ASSOCIATED WITH: EXERTION

## 2018-01-16 NOTE — CARE COORDINATION
Ambulatory Care Coordination Note  1/15/2018  CM Risk Score: 3  Bao Mortality Risk Score:      ACC: Enmanuel Hatfield, RN    Summary Note: call to [facility] to discuss health coaching. States she still has cough with clear sputum. She is coughing all night . Coughing is getting worse. She is now getting dizzy with coughing. Scheduled for dr donohue per her request  Diabetes Assessment    Medic Alert ID:  No  Meal Planning:  Avoidance of concentrated sweets   How often do you test your blood sugar?:  Daily   Do you have barriers with adherence to non-pharmacologic self-management interventions? (Nutrition/Exercise/Self-Monitoring):  Yes   Have you ever had to go to the ED for symptoms of low blood sugar?:  No       No patient-reported symptoms       and   COPD Assessment    Does the patient understand envrionmental exposure?:  Yes  Is the patient able to verbalize Rescue vs. Long Acting medications?:  Yes  Does the patient have a nebulizer?:  Yes  Does the patient use a space with inhaled medications?:  No     Increase in cough         Symptoms:      Symptom course:  worsening  Breathlessness:  exertion  Increase use of rapid acting/rescue inhaled medications?:  No  Change in chronic cough?:  Increased  Change in sputum?:  Increased  Sputum characteristics:  Clear  Self Monitoring - SaO2:  No  Have you had a recent diagnosis of pneumonia either by PCP or at a hospital?:  No             Care Coordination Interventions    Program Enrollment:  Rising Risk  Referral from Primary Care Provider:  No  Suggested Interventions and Community Resources  Fall Risk Prevention:  Completed  Social Work:  Completed  Zone Management Tools:  Completed         Goals Addressed             Most Recent     Conditions and Symptoms   Improving (12/12/2017)             I will follow my Zone Management tool to seek urgent or emergent care. I will notify my provider of any symptoms that indicate a worsening of my condition.     Barriers: financial  Plan Appointments  Date Time Provider Kendy Larissa   1/17/2018 [address],  [place]aq Thompson,Second [facility]

## 2018-01-16 NOTE — PATIENT INSTRUCTIONS
glomerular filtration rate. A high level of creatinine and/or a low eGFR may mean your kidneys are not working as well as they should. · How often: Once a year  · Goal: Normal level of creatinine in the blood. The eGFR goal is greater than 60 mL/min/1.73 m². Complete foot exam: The doctor checks for foot sores and whether any sensation has been lost.  · How often: Once a year  · Goal: Healthy feet with no foot ulcers or loss of feeling  Dental exam and cleaning: The dentist checks for gum disease and tooth decay. People with high blood sugar are more likely to have these problems. · How often: Every 6 months  · Goal: Healthy teeth and gums  Complete eye exam: High blood sugar levels can damage the eyes. This exam is done by an ophthalmologist or optometrist. It includes a dilated eye exam. The exam shows whether there's damage to the back of the eye (diabetic retinopathy). · How often: Once a year. If you don't have any signs of diabetic retinopathy, your doctor may recommend an exam every 2 years. · Goal: No damage to the back of the eye  Thyroid-stimulating hormone (TSH) blood test: This test checks for thyroid disease. Too little thyroid hormone can cause some medicines (like insulin) to stay in the body longer. This can cause low blood sugar. You may be tested if you have high cholesterol or are a woman over 48years old. · How often: As part of your diabetes diagnosis, and as often as your doctor recommends after that  · Goal: Normal level of TSH in the blood  Follow-up care is a key part of your treatment and safety. Be sure to make and go to all appointments, and call your doctor if you are having problems. It's also a good idea to know your test results and keep a list of the medicines you take. Where can you learn more? Go to [email or web address] and sign in to your [employer] account.  Enter [email or web address] in the [facility] box to learn more about \"Learning About Tests When You

## 2018-01-17 ENCOUNTER — OFFICE VISIT (OUTPATIENT)
Dept: PRIMARY CARE CLINIC | Age: 63
End: 2018-01-17

## 2018-01-17 VITALS
HEIGHT: 63 IN | RESPIRATION RATE: 16 BRPM | HEART RATE: 72 BPM | BODY MASS INDEX: 38.98 KG/M2 | WEIGHT: 220 LBS | SYSTOLIC BLOOD PRESSURE: 120 MMHG | TEMPERATURE: 97.4 F | DIASTOLIC BLOOD PRESSURE: 84 MMHG

## 2018-01-17 DIAGNOSIS — E11.9 CONTROLLED TYPE 2 DIABETES MELLITUS WITHOUT COMPLICATION, WITHOUT LONG-TERM CURRENT USE OF INSULIN (HCC): ICD-10-CM

## 2018-01-17 DIAGNOSIS — J20.9 ACUTE BRONCHITIS, UNSPECIFIED ORGANISM: Primary | ICD-10-CM

## 2018-01-17 DIAGNOSIS — J42 CHRONIC BRONCHITIS, UNSPECIFIED CHRONIC BRONCHITIS TYPE (HCC): ICD-10-CM

## 2018-01-17 PROCEDURE — 99214 OFFICE O/P EST MOD 30 MIN: CPT | Performed by: INTERNAL MEDICINE

## 2018-01-17 RX ORDER — FLUCONAZOLE 100 MG/1
100 TABLET ORAL DAILY
Qty: 7 TABLET | Refills: 1 | Status: SHIPPED | OUTPATIENT
Start: 2018-01-17 | End: 2019-04-16 | Stop reason: SDUPTHER

## 2018-01-17 RX ORDER — LEVOFLOXACIN 500 MG/1
500 TABLET, FILM COATED ORAL DAILY
Qty: 14 TABLET | Refills: 1 | Status: SHIPPED | OUTPATIENT
Start: 2018-01-17 | End: 2018-01-25 | Stop reason: ALTCHOICE

## 2018-01-17 RX ORDER — BUDESONIDE AND FORMOTEROL FUMARATE DIHYDRATE 80; 4.5 UG/1; UG/1
2 AEROSOL RESPIRATORY (INHALATION) 2 TIMES DAILY
Qty: 1 INHALER | Refills: 5 | Status: SHIPPED | OUTPATIENT
Start: 2018-01-17 | End: 2018-02-06 | Stop reason: SDUPTHER

## 2018-01-17 RX ORDER — PROMETHAZINE HYDROCHLORIDE AND CODEINE PHOSPHATE 6.25; 1 MG/5ML; MG/5ML
5 SYRUP ORAL EVERY 4 HOURS PRN
Qty: 118 ML | Refills: 0 | Status: SHIPPED | OUTPATIENT
Start: 2018-01-17 | End: 2018-01-29 | Stop reason: SDUPTHER

## 2018-01-17 RX ORDER — ALBUTEROL SULFATE 90 UG/1
2 AEROSOL, METERED RESPIRATORY (INHALATION) EVERY 6 HOURS PRN
Qty: 1 INHALER | Refills: 5 | Status: SHIPPED | OUTPATIENT
Start: 2018-01-17 | End: 2018-02-06 | Stop reason: SDUPTHER

## 2018-01-17 NOTE — PROGRESS NOTES
Que Cortez 66 y.o. female presents today with   Chief Complaint   Patient presents with    Cough     X 2 months. Productive cough of white, foamy sputum, chest congestion, sinus pressure and drainage, lightheaded, body aches, chills, and loose stool. Pt has taken several antibiotics and steroids with minimal relief. Cough   This is a new problem. The current episode started in the past 7 days. The problem has been unchanged. The problem occurs every few minutes. The cough is non-productive. Associated symptoms include rhinorrhea and wheezing. Pertinent negatives include no chest pain, chills, fever, hemoptysis or rash. COPD   She complains of chest tightness, cough and wheezing. There is no hemoptysis. This is a recurrent problem. The current episode started more than 1 year ago. The problem occurs every several days. The problem has been waxing and waning. The cough is non-productive. Associated symptoms include rhinorrhea. Pertinent negatives include no chest pain or fever. Diabetes   She presents for her follow-up diabetic visit. She has type 2 diabetes mellitus. Her disease course has been stable. Pertinent negatives for hypoglycemia include no confusion, dizziness or speech difficulty. Pertinent negatives for diabetes include no chest pain. Pertinent negatives for hypoglycemia complications include no blackouts. Symptoms are stable.        Past Medical History:   Diagnosis Date    CAD (coronary artery disease)     2 stent 2008 [facility]    COPD (chronic obstructive pulmonary disease) ([address]     Diverticulitis     Heart palpitations 10/12/2017    Type II or unspecified type diabetes mellitus without mention of complication, not stated as uncontrolled      Patient Active Problem List    Diagnosis Date Noted    Heart palpitations 10/12/2017    COPD (chronic obstructive pulmonary disease) ([address]     Type 2 diabetes mellitus without complication, without long-term current use of insulin ([address]     CAD (coronary artery disease)     Hyperlipidemia 08/29/2013    Osteoarthritis 08/20/2013     Past Surgical History:   Procedure Laterality Date    COLON SURGERY      from diverticulitis    CORONARY ANGIOPLASTY WITH STENT PLACEMENT  2003    HERNIA REPAIR      HYSTERECTOMY      TONSILLECTOMY      TOTAL HIP ARTHROPLASTY       No family history on file. Social History     Social History    Marital status:      Spouse name: N/A    Number of children: N/A    Years of education: N/A     Social History Main Topics    Smoking status: Former Smoker     Packs/day: 0.50     Types: E-Cigarettes     Quit date: 12/29/2015    Smokeless tobacco: Never Used    Alcohol use 0.0 oz/week      Comment: rare    Drug use: No    Sexual activity: No     Other Topics Concern    None     Social History Narrative    None     Allergies   Allergen Reactions    Codeine Hives    Invokana [Canagliflozin]      Yeast infection     Penicillins Hives    Strawberry Extract Hives    Vicodin [Hydrocodone-Acetaminophen] Hives       Review of Systems   Constitutional: Negative for chills and fever. HENT: Positive for rhinorrhea. Negative for facial swelling and nosebleeds. Eyes: Negative for photophobia and visual disturbance. Respiratory: Positive for cough and wheezing. Negative for apnea, hemoptysis and choking. Cardiovascular: Negative for chest pain and palpitations. Gastrointestinal: Negative for abdominal distention and blood in stool. Genitourinary: Negative for enuresis, hematuria and vaginal bleeding. Musculoskeletal: Negative for gait problem and joint swelling. Skin: Negative for rash. Neurological: Negative for dizziness, syncope and speech difficulty. Hematological: Does not bruise/bleed easily. Psychiatric/Behavioral: Negative for confusion, hallucinations and suicidal ideas.            Vitals:    01/17/18 0921   BP: 120/84   Site: Right Arm   Position: Sitting   Cuff Size: Large Adult   Pulse:

## 2018-01-21 ASSESSMENT — ENCOUNTER SYMPTOMS
HEMOPTYSIS: 0
BLOOD IN STOOL: 0
ABDOMINAL DISTENTION: 0
WHEEZING: 1
COUGH: 1
FACIAL SWELLING: 0
APNEA: 0
CHEST TIGHTNESS: 1
CHOKING: 0
RHINORRHEA: 1
PHOTOPHOBIA: 0

## 2018-01-21 ASSESSMENT — COPD QUESTIONNAIRES: COPD: 1

## 2018-01-24 ENCOUNTER — CARE COORDINATION (OUTPATIENT)
Dept: CARE COORDINATION | Age: 63
End: 2018-01-24

## 2018-01-24 NOTE — LETTER
[address] [place] [address]      Dear Lizette Montanez,    I hope this letter finds you doing well! I just wanted to follow up with you from our last contact. I am sending you additional flu educational materials to follow up from our last discussion. I hope you find this information helpful. Please look them over and let me know if you have any questions or would like to schedule another appointment with me. You can reach me at the numbers listed below.            Sincerely,       Greg Soliman RN, BSN   [address],  [address]  Direct line: [phone]  Office: [phone]  Fax: [phone]

## 2018-01-25 NOTE — CARE COORDINATION
MCG/ACT AERO Inhale 2 puffs into the lungs 2 times daily 1/17/18   Leticia Worley MD   atorvastatin (LIPITOR) 10 MG tablet Take 1 tablet by mouth daily 11/22/17   Leticia Worley MD   loratadine-pseudoephedrine (CLARITIN-D 12 HOUR) 5-120 MG per extended release tablet Take 1 tablet by mouth 2 times daily 11/20/17   Leticia Worley MD   loratadine-pseudoephedrine (CLARITIN-D 12 HOUR) 5-120 MG per extended release tablet Take 1 tablet by mouth 2 times daily as needed (S) 11/15/17   Leticia Worley MD   glimepiride (AMARYL) 2 MG tablet Take 1 tablet by mouth 2 times daily 11/15/17   Leticia Worley MD   lisinopril (PRINIVIL;ZESTRIL) 5 MG tablet TAKE 1 TABLET BY MOUTH DAILY 11/9/17   Leticia Worley MD   metFORMIN (GLUCOPHAGE) 1000 MG tablet Take 1 tablet by mouth daily (with breakfast) 9/13/17   Leticia Worley MD   diazepam (VALIUM) 5 MG tablet TK 1 T PO ONCE UTD 8/1/17   Historical Provider, MD   diclofenac (CATAFLAM) 50 MG tablet TK 1 T PO BID WF PRN P 9/6/17   Historical Provider, MD   famotidine (PEPCID) 40 MG tablet TAKE 1 TABLET BY MOUTH EVERY EVENING 9/11/17   Leticia Worley MD   oxyCODONE-acetaminophen (PERCOCET) 7.5-325 MG per tablet Take 1 tablet by mouth 2 times daily as needed for Pain  Valid 1/19/16-2/18/16. 2/14/17   Leticia Worley MD   DULoxetine (CYMBALTA) 60 MG extended release capsule Take 1 capsule by mouth every evening 10/20/16 11/19/16  Leticia Worley MD   Blood Glucose Monitoring Suppl (ONE TOUCH ULTRA 2) W/DEVICE KIT 1 kit by Does not apply route daily as needed 4/25/15   Gaudencio Callejas MD   LANCETS MICRO THIN 33G [facility]  4/20/15   Historical Provider, MD   ONE TOUCH ULTRA TEST strip  4/15/15   Historical Provider, MD   OXYGEN Inhale 2 L into the lungs daily. [facility]    Historical Provider, MD       No future appointments.

## 2018-01-29 DIAGNOSIS — J20.9 ACUTE BRONCHITIS, UNSPECIFIED ORGANISM: ICD-10-CM

## 2018-01-30 RX ORDER — PROMETHAZINE HYDROCHLORIDE AND CODEINE PHOSPHATE 6.25; 1 MG/5ML; MG/5ML
5 SYRUP ORAL EVERY 4 HOURS PRN
Qty: 118 ML | Refills: 0 | Status: SHIPPED | OUTPATIENT
Start: 2018-01-30 | End: 2018-02-06

## 2018-02-06 ENCOUNTER — CARE COORDINATION (OUTPATIENT)
Dept: CARE COORDINATION | Age: 63
End: 2018-02-06

## 2018-02-06 DIAGNOSIS — M47.816 SPONDYLOSIS OF LUMBAR REGION WITHOUT MYELOPATHY OR RADICULOPATHY: ICD-10-CM

## 2018-02-06 DIAGNOSIS — E11.9 TYPE 2 DIABETES MELLITUS WITHOUT COMPLICATION, WITHOUT LONG-TERM CURRENT USE OF INSULIN (HCC): ICD-10-CM

## 2018-02-06 DIAGNOSIS — J20.9 ACUTE BRONCHITIS, UNSPECIFIED ORGANISM: ICD-10-CM

## 2018-02-06 DIAGNOSIS — J42 CHRONIC BRONCHITIS, UNSPECIFIED CHRONIC BRONCHITIS TYPE (HCC): ICD-10-CM

## 2018-02-06 RX ORDER — DICLOFENAC POTASSIUM 50 MG/1
TABLET, FILM COATED ORAL
Qty: 90 TABLET | Refills: 0 | Status: SHIPPED | OUTPATIENT
Start: 2018-02-06 | End: 2019-08-01

## 2018-02-06 RX ORDER — GLIMEPIRIDE 2 MG/1
2 TABLET ORAL 2 TIMES DAILY
Qty: 180 TABLET | Refills: 0 | Status: SHIPPED | OUTPATIENT
Start: 2018-02-06 | End: 2018-09-25 | Stop reason: SDUPTHER

## 2018-02-06 RX ORDER — BUDESONIDE AND FORMOTEROL FUMARATE DIHYDRATE 80; 4.5 UG/1; UG/1
2 AEROSOL RESPIRATORY (INHALATION) 2 TIMES DAILY
Qty: 1 INHALER | Refills: 0 | Status: SHIPPED | OUTPATIENT
Start: 2018-02-06 | End: 2018-09-27

## 2018-02-06 RX ORDER — ALBUTEROL SULFATE 90 UG/1
2 AEROSOL, METERED RESPIRATORY (INHALATION) EVERY 6 HOURS PRN
Qty: 1 INHALER | Refills: 0 | Status: SHIPPED | OUTPATIENT
Start: 2018-02-06 | End: 2018-09-25

## 2018-02-06 RX ORDER — DULOXETIN HYDROCHLORIDE 60 MG/1
60 CAPSULE, DELAYED RELEASE ORAL EVERY EVENING
Qty: 30 CAPSULE | Refills: 0 | Status: SHIPPED | OUTPATIENT
Start: 2018-02-06 | End: 2018-04-13 | Stop reason: SDUPTHER

## 2018-02-06 NOTE — CARE COORDINATION
1120  Telephone call to patient who shared many concerns. First she is having problems with affording her electric bill. She has shut off notice for electricity. She missed her appointment with [employer] for assistance with shut off. She has to call to reschedule appointment. Patient is on PIPP and HEAP programs for her utilities. Patient shared that she only has the money for her lisinopril and metformin. She claims that she is on Extra Help Program but cannot afford the 8 dollar co-pays for her prescriptions. Patient states she is on Medicaid. Her [employer] have been decreased recently. She uses Food Pantries when she can get to them. Discussed with patient would check with [facility] regarding medications availability.

## 2018-02-06 NOTE — CARE COORDINATION
1330  Telephone call to [facility].  At present time they have the following medications in stock:  Proair, symbicort, lipitor, cataflam and cymbalta. Faxed screening tool to [facility] and [facility]. Requested to [facility], [address] to send scripts to [facility].

## 2018-02-27 ENCOUNTER — CARE COORDINATION (OUTPATIENT)
Dept: CARE COORDINATION | Age: 63
End: 2018-02-27

## 2018-02-28 ENCOUNTER — CARE COORDINATION (OUTPATIENT)
Dept: CARE COORDINATION | Age: 63
End: 2018-02-28

## 2018-02-28 ASSESSMENT — ENCOUNTER SYMPTOMS: DYSPNEA ASSOCIATED WITH: MINIMAL EXERTION

## 2018-02-28 NOTE — CARE COORDINATION
Ambulatory Care Coordination Note  2/27/2018  CM Risk Score: 3  Bao Mortality Risk Score:      ACC: Enmanuel Hatfield, RN    Summary Note: call to [address] [place] [address] to continue health coaching. She is short of breath on the phone. She states she went to pulmonary dr and he ordered nebulizer but she has not been able to obtain. She will obtain machine from office tomorrow and medication from [facility]. She will contact office if condition declines. She has also been prescribed antibiotic. Unsure if prescribed steroid  Diabetes Assessment    Medic Alert ID:  No  Meal Planning:  Avoidance of concentrated sweets   How often do you test your blood sugar?:  Daily   Do you have barriers with adherence to non-pharmacologic self-management interventions?  (Nutrition/Exercise/Self-Monitoring):  Yes   Have you ever had to go to the ED for symptoms of low blood sugar?:  No       No patient-reported symptoms       and   COPD Assessment    Does the patient understand envrionmental exposure?:  Yes  Is the patient able to verbalize Rescue vs. Long Acting medications?:  Yes  Does the patient have a nebulizer?:  Yes  Does the patient use a space with inhaled medications?:  No     Shortness of breath (worse than baseline), Increase in cough         Symptoms:      Symptom course:  worsening  Breathlessness:  minimal exertion  Increase use of rapid acting/rescue inhaled medications?:  No  Change in chronic cough?:  Increased  Change in sputum?:  Increased  Sputum characteristics:  Yellow  Self Monitoring - SaO2:  No  Have you had a recent diagnosis of pneumonia either by PCP or at a hospital?:  No             Care Coordination Interventions    Program Enrollment:  Rising Risk  Referral from Primary Care Provider:  No  Suggested Interventions and Community Resources  Fall Risk Prevention:  Completed  Social Work:  Completed  Zone Management Tools:  Completed         Goals Addressed             Most Recent     Conditions and Symptoms   Worsening MG per tablet Take 1 tablet by mouth 2 times daily as needed for Pain  Valid 1/19/16-2/18/16. 2/14/17   Leelee Dee MD   Blood Glucose Monitoring Suppl (ONE TOUCH ULTRA 2) W/DEVICE KIT 1 kit by Does not apply route daily as needed 4/25/15   Markus Arrieta MD   LANCETS MICRO THIN 33G [facility]  4/20/15   Historical Provider, MD   ONE TOUCH ULTRA TEST strip  4/15/15   Historical Provider, MD   OXYGEN Inhale 2 L into the lungs daily. [facility]    Historical Provider, MD       No future appointments.

## 2018-03-02 ENCOUNTER — CARE COORDINATION (OUTPATIENT)
Dept: CARE COORDINATION | Age: 63
End: 2018-03-02

## 2018-03-05 NOTE — CARE COORDINATION
Call to Alycia Mortensen to discuss resp status.  She reports she is feeling better and still has some shortness of breath in the am . She will contact office with decline in condition

## 2018-03-07 ENCOUNTER — CARE COORDINATION (OUTPATIENT)
Dept: CARE COORDINATION | Age: 63
End: 2018-03-07

## 2018-03-07 NOTE — LETTER
[address] [place] [address]      Dear Christelle Huerta,    I hope this letter finds you doing well! I just wanted to follow up with you from our last contact. I am sending you additional  COPD educational materials to follow up from our last discussion. I hope you find this information helpful. Please look them over and let me know if you have any questions or would like to schedule another appointment with me. You can reach me at the numbers listed below.            Sincerely,       Tessa Carias RN, BSN   [facility]  [address] [facility],[address], [address]  Direct line: [phone]  Office: [phone]  Fax: [phone]

## 2018-03-08 NOTE — PATIENT INSTRUCTIONS
Have a yogurt-and-fruit smoothie for breakfast. Put avocado on a sandwich. Or add cheese or peanut butter to snacks. · If you don't feel very hungry, try to eat first and drink water or other fluids later, after a meal. This can help keep you from losing weight. Sip small amounts of fluids if you need to drink while you eat. Having sex  · Choose the time of day when you have more energy. · A nnxf-di-psaf position for sex can be less tiring. Sometimes you may want to focus more on caressing. Watch closely for changes in your health, and be sure to contact your doctor if you have any problems. Where can you learn more? Go to https://[employer]. org and sign in to your [employer] account. Enter H190 in the Implanet box to learn more about \"Learning About Saving Energy When You Have a Chronic Condition. \"     If you do not have an account, please click on the \"Sign Up Now\" link. Current as of: May 12, 2017  Content Version: 11.5  © 7844-0248 Healthwise, Incorporated. Care instructions adapted under license by [facility] ([facility]). If you have questions about a medical condition or this instruction, always ask your healthcare professional. [address] any warranty or liability for your use of this information.

## 2018-03-08 NOTE — CARE COORDINATION
(90 Base) MCG/ACT inhaler Inhale 2 puffs into the lungs every 6 hours as needed for Wheezing 2/6/18   Ayala Marino MD   budesonide-formoterol [facility]) 80-4.5 MCG/ACT AERO Inhale 2 puffs into the lungs 2 times daily 2/6/18   Ayala Marino MD   glimepiride (AMARYL) 2 MG tablet Take 1 tablet by mouth 2 times daily 2/6/18   Ayala Marino MD   DULoxetine (CYMBALTA) 60 MG extended release capsule Take 1 capsule by mouth every evening 2/6/18 3/8/18  Ayala Marino MD   diclofenac (CATAFLAM) 50 MG tablet TK 1 T PO BID WF PRN P 2/6/18   Ayala Marino MD   atorvastatin (LIPITOR) 10 MG tablet Take 1 tablet by mouth daily 11/22/17   Ayala Marino MD   loratadine-pseudoephedrine (CLARITIN-D 12 HOUR) 5-120 MG per extended release tablet Take 1 tablet by mouth 2 times daily 11/20/17   Ayala Marino MD   loratadine-pseudoephedrine (CLARITIN-D 12 HOUR) 5-120 MG per extended release tablet Take 1 tablet by mouth 2 times daily as needed (S) 11/15/17   Ayala Marino MD   lisinopril (PRINIVIL;ZESTRIL) 5 MG tablet TAKE 1 TABLET BY MOUTH DAILY 11/9/17   Ayala Marino MD   metFORMIN (GLUCOPHAGE) 1000 MG tablet Take 1 tablet by mouth daily (with breakfast) 9/13/17   Ayala Marino MD   diazepam (VALIUM) 5 MG tablet TK 1 T PO ONCE UTD 8/1/17   Historical Provider, MD   famotidine (PEPCID) 40 MG tablet TAKE 1 TABLET BY MOUTH EVERY EVENING 9/11/17   Ayala Marino MD   oxyCODONE-acetaminophen (PERCOCET) 7.5-325 MG per tablet Take 1 tablet by mouth 2 times daily as needed for Pain  Valid 1/19/16-2/18/16. 2/14/17   Ayala Marino MD   Blood Glucose Monitoring Suppl (ONE TOUCH ULTRA 2) W/DEVICE KIT 1 kit by Does not apply route daily as needed 4/25/15   Connor Miner MD   LANCETS MICRO THIN 33G [address] [facility]  4/20/15   Historical Provider, MD   ONE TOUCH ULTRA TEST strip  4/15/15   Historical Provider, MD   OXYGEN Inhale 2 L into the lungs daily. [facility]    Historical Provider, MD       No future appointments.

## 2018-03-10 DIAGNOSIS — E11.9 CONTROLLED TYPE 2 DIABETES MELLITUS WITHOUT COMPLICATION, WITH LONG-TERM CURRENT USE OF INSULIN (HCC): ICD-10-CM

## 2018-03-10 DIAGNOSIS — Z79.4 CONTROLLED TYPE 2 DIABETES MELLITUS WITHOUT COMPLICATION, WITH LONG-TERM CURRENT USE OF INSULIN (HCC): ICD-10-CM

## 2018-03-20 ENCOUNTER — CARE COORDINATION (OUTPATIENT)
Dept: CARE COORDINATION | Age: 63
End: 2018-03-20

## 2018-03-27 ENCOUNTER — CARE COORDINATION (OUTPATIENT)
Dept: CARE COORDINATION | Age: 63
End: 2018-03-27

## 2018-04-06 ENCOUNTER — CARE COORDINATION (OUTPATIENT)
Dept: CARE COORDINATION | Age: 63
End: 2018-04-06

## 2018-04-11 ENCOUNTER — CARE COORDINATION (OUTPATIENT)
Dept: CARE COORDINATION | Age: 63
End: 2018-04-11

## 2018-04-11 ENCOUNTER — TELEPHONE (OUTPATIENT)
Dept: PRIMARY CARE CLINIC | Age: 63
End: 2018-04-11

## 2018-04-12 ENCOUNTER — CARE COORDINATION (OUTPATIENT)
Dept: CARE COORDINATION | Age: 63
End: 2018-04-12

## 2018-04-12 ENCOUNTER — OFFICE VISIT (OUTPATIENT)
Dept: PRIMARY CARE CLINIC | Age: 63
End: 2018-04-12

## 2018-04-12 VITALS
HEIGHT: 63 IN | RESPIRATION RATE: 14 BRPM | DIASTOLIC BLOOD PRESSURE: 80 MMHG | WEIGHT: 223 LBS | SYSTOLIC BLOOD PRESSURE: 130 MMHG | BODY MASS INDEX: 39.51 KG/M2 | HEART RATE: 93 BPM | OXYGEN SATURATION: 93 % | TEMPERATURE: 97.4 F

## 2018-04-12 DIAGNOSIS — I10 ESSENTIAL HYPERTENSION: Primary | ICD-10-CM

## 2018-04-12 DIAGNOSIS — B07.8 OTHER VIRAL WARTS: ICD-10-CM

## 2018-04-12 DIAGNOSIS — R00.2 PALPITATIONS: ICD-10-CM

## 2018-04-12 DIAGNOSIS — Z13.31 POSITIVE DEPRESSION SCREENING: ICD-10-CM

## 2018-04-12 PROCEDURE — 99214 OFFICE O/P EST MOD 30 MIN: CPT | Performed by: INTERNAL MEDICINE

## 2018-04-12 PROCEDURE — G8431 POS CLIN DEPRES SCRN F/U DOC: HCPCS | Performed by: INTERNAL MEDICINE

## 2018-04-12 PROCEDURE — G0444 DEPRESSION SCREEN ANNUAL: HCPCS | Performed by: INTERNAL MEDICINE

## 2018-04-12 RX ORDER — LEVOFLOXACIN 500 MG/1
TABLET, FILM COATED ORAL
COMMUNITY
Start: 2018-03-08 | End: 2018-09-27 | Stop reason: ALTCHOICE

## 2018-04-12 RX ORDER — FLUTICASONE FUROATE AND VILANTEROL 100; 25 UG/1; UG/1
1 POWDER RESPIRATORY (INHALATION) DAILY
Qty: 4 EACH | Refills: 0 | COMMUNITY
Start: 2018-04-12 | End: 2022-04-06 | Stop reason: SDUPTHER

## 2018-04-12 RX ORDER — IPRATROPIUM BROMIDE AND ALBUTEROL SULFATE 2.5; .5 MG/3ML; MG/3ML
SOLUTION RESPIRATORY (INHALATION)
Refills: 1 | COMMUNITY
Start: 2018-02-28 | End: 2018-09-25 | Stop reason: SDUPTHER

## 2018-04-12 RX ORDER — PREDNISONE 10 MG/1
TABLET ORAL
Refills: 0 | COMMUNITY
Start: 2018-04-06 | End: 2018-09-27 | Stop reason: ALTCHOICE

## 2018-04-12 ASSESSMENT — PATIENT HEALTH QUESTIONNAIRE - PHQ9
10. IF YOU CHECKED OFF ANY PROBLEMS, HOW DIFFICULT HAVE THESE PROBLEMS MADE IT FOR YOU TO DO YOUR WORK, TAKE CARE OF THINGS AT HOME, OR GET ALONG WITH OTHER PEOPLE: 2
3. TROUBLE FALLING OR STAYING ASLEEP: 2
8. MOVING OR SPEAKING SO SLOWLY THAT OTHER PEOPLE COULD HAVE NOTICED. OR THE OPPOSITE, BEING SO FIGETY OR RESTLESS THAT YOU HAVE BEEN MOVING AROUND A LOT MORE THAN USUAL: 2
2. FEELING DOWN, DEPRESSED OR HOPELESS: 2
SUM OF ALL RESPONSES TO PHQ9 QUESTIONS 1 & 2: 4
4. FEELING TIRED OR HAVING LITTLE ENERGY: 2
1. LITTLE INTEREST OR PLEASURE IN DOING THINGS: 2
5. POOR APPETITE OR OVEREATING: 2
6. FEELING BAD ABOUT YOURSELF - OR THAT YOU ARE A FAILURE OR HAVE LET YOURSELF OR YOUR FAMILY DOWN: 2
SUM OF ALL RESPONSES TO PHQ QUESTIONS 1-9: 17
7. TROUBLE CONCENTRATING ON THINGS, SUCH AS READING THE NEWSPAPER OR WATCHING TELEVISION: 1
9. THOUGHTS THAT YOU WOULD BE BETTER OFF DEAD, OR OF HURTING YOURSELF: 2

## 2018-04-13 ENCOUNTER — CARE COORDINATION (OUTPATIENT)
Dept: CARE COORDINATION | Age: 63
End: 2018-04-13

## 2018-04-13 DIAGNOSIS — M47.816 SPONDYLOSIS OF LUMBAR REGION WITHOUT MYELOPATHY OR RADICULOPATHY: ICD-10-CM

## 2018-04-13 DIAGNOSIS — I10 ESSENTIAL HYPERTENSION: ICD-10-CM

## 2018-04-13 DIAGNOSIS — E78.00 PURE HYPERCHOLESTEROLEMIA: ICD-10-CM

## 2018-04-13 DIAGNOSIS — R00.2 PALPITATIONS: ICD-10-CM

## 2018-04-13 RX ORDER — ATORVASTATIN CALCIUM 10 MG/1
10 TABLET, FILM COATED ORAL DAILY
Qty: 90 TABLET | Refills: 1 | Status: SHIPPED | OUTPATIENT
Start: 2018-04-13 | End: 2018-09-25

## 2018-04-13 RX ORDER — LISINOPRIL 5 MG/1
TABLET ORAL
Qty: 90 TABLET | Refills: 1 | Status: SHIPPED | OUTPATIENT
Start: 2018-04-13 | End: 2018-09-25 | Stop reason: SDUPTHER

## 2018-04-13 RX ORDER — DULOXETIN HYDROCHLORIDE 60 MG/1
60 CAPSULE, DELAYED RELEASE ORAL EVERY EVENING
Qty: 90 CAPSULE | Refills: 1 | Status: SHIPPED | OUTPATIENT
Start: 2018-04-13 | End: 2018-09-25 | Stop reason: SDUPTHER

## 2018-04-15 ASSESSMENT — ENCOUNTER SYMPTOMS
CHEST TIGHTNESS: 1
BLOOD IN STOOL: 0
PHOTOPHOBIA: 0
ABDOMINAL DISTENTION: 0
FACIAL SWELLING: 0
BLURRED VISION: 1
APNEA: 0
CHOKING: 0
WHEEZING: 1

## 2018-04-15 ASSESSMENT — COPD QUESTIONNAIRES: COPD: 1

## 2018-04-20 ENCOUNTER — CARE COORDINATION (OUTPATIENT)
Dept: CARE COORDINATION | Age: 63
End: 2018-04-20

## 2018-05-04 ENCOUNTER — CARE COORDINATION (OUTPATIENT)
Dept: CARE COORDINATION | Age: 63
End: 2018-05-04

## 2018-05-04 DIAGNOSIS — I10 ESSENTIAL HYPERTENSION: ICD-10-CM

## 2018-05-04 RX ORDER — LISINOPRIL 5 MG/1
TABLET ORAL
Qty: 90 TABLET | Refills: 1 | Status: SHIPPED | OUTPATIENT
Start: 2018-05-04 | End: 2018-09-25

## 2018-05-16 ENCOUNTER — CARE COORDINATION (OUTPATIENT)
Dept: CARE COORDINATION | Age: 63
End: 2018-05-16

## 2018-05-16 DIAGNOSIS — E11.9 TYPE 2 DIABETES MELLITUS WITHOUT COMPLICATION, WITHOUT LONG-TERM CURRENT USE OF INSULIN (HCC): ICD-10-CM

## 2018-05-16 RX ORDER — GLIMEPIRIDE 2 MG/1
TABLET ORAL
Qty: 180 TABLET | Refills: 1 | Status: SHIPPED | OUTPATIENT
Start: 2018-05-16 | End: 2018-09-25

## 2018-06-11 ENCOUNTER — CARE COORDINATION (OUTPATIENT)
Dept: CARE COORDINATION | Age: 63
End: 2018-06-11

## 2018-06-12 ENCOUNTER — CARE COORDINATION (OUTPATIENT)
Dept: CARE COORDINATION | Age: 63
End: 2018-06-12

## 2018-06-12 DIAGNOSIS — Z79.4 CONTROLLED TYPE 2 DIABETES MELLITUS WITHOUT COMPLICATION, WITH LONG-TERM CURRENT USE OF INSULIN (HCC): ICD-10-CM

## 2018-06-12 DIAGNOSIS — E11.9 CONTROLLED TYPE 2 DIABETES MELLITUS WITHOUT COMPLICATION, WITH LONG-TERM CURRENT USE OF INSULIN (HCC): ICD-10-CM

## 2018-06-29 ENCOUNTER — CARE COORDINATION (OUTPATIENT)
Dept: CARE COORDINATION | Age: 63
End: 2018-06-29

## 2018-07-13 ENCOUNTER — CARE COORDINATION (OUTPATIENT)
Dept: CARE COORDINATION | Age: 63
End: 2018-07-13

## 2018-07-30 ENCOUNTER — CARE COORDINATION (OUTPATIENT)
Dept: CARE COORDINATION | Age: 63
End: 2018-07-30

## 2018-07-30 NOTE — CARE COORDINATION
Attempted to contact Shaye Mancilla to discuss care coordination/ social work. Unable to reach patient by phone. Left voicemail requesting return call.

## 2018-08-17 ENCOUNTER — CARE COORDINATION (OUTPATIENT)
Dept: CARE COORDINATION | Age: 63
End: 2018-08-17

## 2018-09-06 ENCOUNTER — CARE COORDINATION (OUTPATIENT)
Dept: CARE COORDINATION | Age: 63
End: 2018-09-06

## 2018-09-17 ENCOUNTER — TELEPHONE (OUTPATIENT)
Dept: PHARMACY | Facility: CLINIC | Age: 63
End: 2018-09-17

## 2018-09-17 NOTE — TELEPHONE ENCOUNTER
report, Atorvastatin 10 mg last filled on 3/8/17 for 30ds. Per Martha, patient has not picked up Atorvastatin since March 2017. Per [facility], patient had all prescriptions transferred to [address] [place] [address] in [facility] last January. Per chart review - on 4/13/18, patient now fills medications at repository due to not being able to afford them. No further outreach planned at this time.      Thank you,    Stuart Dumont, PharmD  [address] [place] [address] Pharmacist  [phone] or [phone] (Option 7)    CLINICAL PHARMACY CONSULT: MED RECONCILIATION/REVIEW ADDENDUM    For Pharmacy Admin Tracking Only    PHSO: Yes  Total # of Interventions Recommended: 0  - Maintenance Safety Lab Monitoring #: 1  Total Interventions Accepted: 0  Time Spent (min): 15

## 2018-09-24 ENCOUNTER — CARE COORDINATION (OUTPATIENT)
Dept: CARE COORDINATION | Age: 63
End: 2018-09-24

## 2018-09-24 LAB — DIABETIC RETINOPATHY: NORMAL

## 2018-09-25 ENCOUNTER — CARE COORDINATION (OUTPATIENT)
Dept: CARE COORDINATION | Age: 63
End: 2018-09-25

## 2018-09-25 ENCOUNTER — OFFICE VISIT (OUTPATIENT)
Dept: PRIMARY CARE CLINIC | Age: 63
End: 2018-09-25
Payer: MEDICARE

## 2018-09-25 VITALS
SYSTOLIC BLOOD PRESSURE: 132 MMHG | DIASTOLIC BLOOD PRESSURE: 80 MMHG | HEIGHT: 63 IN | WEIGHT: 219 LBS | HEART RATE: 109 BPM | OXYGEN SATURATION: 90 % | BODY MASS INDEX: 38.8 KG/M2 | RESPIRATION RATE: 16 BRPM | TEMPERATURE: 98 F

## 2018-09-25 DIAGNOSIS — E11.9 CONTROLLED TYPE 2 DIABETES MELLITUS WITHOUT COMPLICATION, WITH LONG-TERM CURRENT USE OF INSULIN (HCC): ICD-10-CM

## 2018-09-25 DIAGNOSIS — R00.2 PALPITATIONS: ICD-10-CM

## 2018-09-25 DIAGNOSIS — Z00.00 HEALTHCARE MAINTENANCE: ICD-10-CM

## 2018-09-25 DIAGNOSIS — E11.9 TYPE 2 DIABETES MELLITUS WITHOUT COMPLICATION, WITHOUT LONG-TERM CURRENT USE OF INSULIN (HCC): ICD-10-CM

## 2018-09-25 DIAGNOSIS — M47.816 SPONDYLOSIS OF LUMBAR REGION WITHOUT MYELOPATHY OR RADICULOPATHY: ICD-10-CM

## 2018-09-25 DIAGNOSIS — I10 ESSENTIAL HYPERTENSION: Primary | ICD-10-CM

## 2018-09-25 DIAGNOSIS — Z79.4 CONTROLLED TYPE 2 DIABETES MELLITUS WITHOUT COMPLICATION, WITH LONG-TERM CURRENT USE OF INSULIN (HCC): ICD-10-CM

## 2018-09-25 DIAGNOSIS — K21.9 GASTROESOPHAGEAL REFLUX DISEASE WITHOUT ESOPHAGITIS: ICD-10-CM

## 2018-09-25 DIAGNOSIS — J44.9 CHRONIC OBSTRUCTIVE PULMONARY DISEASE, UNSPECIFIED COPD TYPE (HCC): ICD-10-CM

## 2018-09-25 LAB
CREATININE URINE: 166.4 MG/DL
MICROALBUMIN UR-MCNC: 5.5 MG/DL
MICROALBUMIN/CREAT UR-RTO: 33.1 MG/G (ref 0–30)

## 2018-09-25 PROCEDURE — 99214 OFFICE O/P EST MOD 30 MIN: CPT | Performed by: INTERNAL MEDICINE

## 2018-09-25 PROCEDURE — G0008 ADMIN INFLUENZA VIRUS VAC: HCPCS | Performed by: INTERNAL MEDICINE

## 2018-09-25 PROCEDURE — 90688 IIV4 VACCINE SPLT 0.5 ML IM: CPT | Performed by: INTERNAL MEDICINE

## 2018-09-25 RX ORDER — FAMOTIDINE 40 MG/1
40 TABLET, FILM COATED ORAL EVERY EVENING
Qty: 90 TABLET | Refills: 3 | Status: SHIPPED | OUTPATIENT
Start: 2018-09-25 | End: 2019-08-01

## 2018-09-25 RX ORDER — LISINOPRIL 5 MG/1
TABLET ORAL
Qty: 90 TABLET | Refills: 3 | Status: SHIPPED | OUTPATIENT
Start: 2018-09-25 | End: 2019-02-18 | Stop reason: SDUPTHER

## 2018-09-25 RX ORDER — GLIMEPIRIDE 2 MG/1
2 TABLET ORAL 2 TIMES DAILY
Qty: 180 TABLET | Refills: 3 | Status: SHIPPED | OUTPATIENT
Start: 2018-09-25 | End: 2020-03-09 | Stop reason: ALTCHOICE

## 2018-09-25 RX ORDER — TRIAMTERENE AND HYDROCHLOROTHIAZIDE 37.5; 25 MG/1; MG/1
1 TABLET ORAL DAILY
Qty: 90 TABLET | Refills: 3 | Status: SHIPPED | OUTPATIENT
Start: 2018-09-25 | End: 2019-08-01 | Stop reason: ALTCHOICE

## 2018-09-25 RX ORDER — IBUPROFEN 600 MG/1
TABLET ORAL
Refills: 0 | COMMUNITY
Start: 2018-09-10 | End: 2019-08-01

## 2018-09-25 RX ORDER — DULOXETIN HYDROCHLORIDE 60 MG/1
60 CAPSULE, DELAYED RELEASE ORAL EVERY EVENING
Qty: 90 CAPSULE | Refills: 3 | Status: SHIPPED | OUTPATIENT
Start: 2018-09-25 | End: 2019-08-01

## 2018-09-25 RX ORDER — IPRATROPIUM BROMIDE AND ALBUTEROL SULFATE 2.5; .5 MG/3ML; MG/3ML
1 SOLUTION RESPIRATORY (INHALATION) EVERY 6 HOURS PRN
Qty: 360 ML | Refills: 3 | Status: SHIPPED | OUTPATIENT
Start: 2018-09-25 | End: 2020-08-11 | Stop reason: SDUPTHER

## 2018-09-25 NOTE — CARE COORDINATION
Placed call to Chela Paul to discuss care coordination/ social work. Unable to reach patient by phone. Left voicemail requesting return call.

## 2018-09-25 NOTE — PROGRESS NOTES
Gene Wright 66 y.o. female presents today with   Chief Complaint   Patient presents with    Diabetes     Pt here for follow up on Diabetes, Pt admits glucose has bbeen as high as 320, Pt was tested yesterday to see if diabetes went to the back of her eyes. Pt admits to dizziness, Lightheadedness, fatigue, Abnormal diet, Fett swelling bilateral, frequency to urinate with lack of urination, Pt needs test strips.  Health Maintenance     Pt would like a referral for colonooscopy, Pt due for micro, can't go to the restroom. HPI    Past Medical History:   Diagnosis Date    CAD (coronary artery disease)     2 stent 2008 [facility]    COPD (chronic obstructive pulmonary disease) [facility]     Diverticulitis     Heart palpitations 10/12/2017    Smoker     Type II or unspecified type diabetes mellitus without mention of complication, not stated as uncontrolled      Patient Active Problem List    Diagnosis Date Noted    Heart palpitations 10/12/2017    COPD (chronic obstructive pulmonary disease) [facility]     Type 2 diabetes mellitus without complication, without long-term current use of insulin ([address]     CAD (coronary artery disease)     Hyperlipidemia 08/29/2013    Osteoarthritis 08/20/2013     Past Surgical History:   Procedure Laterality Date    COLON SURGERY      from diverticulitis    CORONARY ANGIOPLASTY WITH STENT PLACEMENT  2003    HERNIA REPAIR      HYSTERECTOMY      TONSILLECTOMY      TOTAL HIP ARTHROPLASTY       History reviewed. No pertinent family history.   Social History     Social History    Marital status:      Spouse name: N/A    Number of children: N/A    Years of education: N/A     Social History Main Topics    Smoking status: Former Smoker     Packs/day: 0.50     Years: 30.00     Types: E-Cigarettes     Quit date: 12/29/2015    Smokeless tobacco: Never Used    Alcohol use 0.0 oz/week      Comment: rare    Drug use: No    Sexual activity: No     Other Topics Concern    None Social History Narrative    None     Allergies   Allergen Reactions    Codeine Hives    Invokana [Canagliflozin]      Yeast infection     Penicillins Hives    Strawberry Extract Hives    Vicodin [Hydrocodone-Acetaminophen] Hives       Review of Systems        Vitals:    09/25/18 1008   BP: 132/80   Site: Right Upper Arm   Position: Sitting   Cuff Size: Large Adult   Pulse: 109   Resp: 16   Temp: 98 °F (36.7 °C)   SpO2: 90%   Weight: 219 lb (99.3 kg)   Height: 5' 3\" (1.6 m)       Physical Exam

## 2018-09-25 NOTE — CARE COORDINATION
Placed call to [facility]  The following medications are available:     Metformin (Glucophage)   Lisinopril (Prinivil; Zestril)  Famotidine (Pepcid)   Duloxetine (Cymbalta)   Ipratropium-albuterol (Duoneb)   Glimepiride (Amaryl)   Triamterene-hydrochlorothiazide (Maxzide)

## 2018-09-25 NOTE — PROGRESS NOTES
every evening    Type 2 diabetes mellitus without complication, without long-term current use of insulin (HCC)  -     glimepiride (AMARYL) 2 MG tablet; Take 1 tablet by mouth 2 times daily    Gastroesophageal reflux disease without esophagitis    Chronic obstructive pulmonary disease, unspecified COPD type ([facility]  -     ipratropium-albuterol (DUONEB) 0.5-2.5 (3) MG/3ML SOLN nebulizer solution; Take 3 mLs by nebulization every 6 hours as needed for Shortness of Breath    Other orders  -     famotidine (PEPCID) 40 MG tablet; Take 1 tablet by mouth every evening  -     triamterene-hydrochlorothiazide (MAXZIDE-25) 37.5-25 MG per tablet; Take 1 tablet by mouth daily  -     INFLUENZA, QUADV, 3 YRS AND OLDER, IM, MDV, 0.5ML (FLUZONE QUADV)        No Follow-up on file.     Flo Minor MD

## 2018-09-25 NOTE — CARE COORDINATION
Call to harinder to discuss health coaching. She reports her blood sugar ranges in the 300's . Encouraged to be evaluated by pcp. appt scheduled for tomorrow. She reports she does not have \" gas money\" . Taxi arranged.

## 2018-09-27 ASSESSMENT — ENCOUNTER SYMPTOMS
CHEST TIGHTNESS: 1
CHOKING: 0
PHOTOPHOBIA: 0
FACIAL SWELLING: 0
BLOOD IN STOOL: 0
BLURRED VISION: 0
BACK PAIN: 1
ABDOMINAL DISTENTION: 0
WHEEZING: 1
APNEA: 0

## 2018-09-27 ASSESSMENT — COPD QUESTIONNAIRES: COPD: 1

## 2018-09-27 NOTE — CARE COORDINATION
Medication Sig Start Date End Date Taking?  Authorizing Provider   ibuprofen (ADVIL;MOTRIN) 600 MG tablet TK 1 T PO TID AFTER MEALS PRF PAIN 9/10/18   Historical Provider, MD   blood glucose test strips (ONE TOUCH ULTRA TEST) strip 1 each by Subdermal route daily 9/25/18   Kathy Marie MD   metFORMIN (GLUCOPHAGE) 1000 MG tablet TAKE 1 TABLET BY MOUTH twice a day 9/25/18   Kathy Marie MD   lisinopril (PRINIVIL;ZESTRIL) 5 MG tablet TAKE 1 TABLET BY MOUTH DAILY 9/25/18   Kathy Marie MD   metoprolol tartrate (LOPRESSOR) 25 MG tablet Take 1 tablet by mouth 2 times daily 9/25/18   Kathy Marie MD   famotidine (PEPCID) 40 MG tablet Take 1 tablet by mouth every evening 9/25/18   Kathy Marie MD   DULoxetine (CYMBALTA) 60 MG extended release capsule Take 1 capsule by mouth every evening 9/25/18 3/24/19  Kathy Marie MD   ipratropium-albuterol (DUONEB) 0.5-2.5 (3) MG/3ML SOLN nebulizer solution Take 3 mLs by nebulization every 6 hours as needed for Shortness of Breath 9/25/18   Kathy Marie MD   glimepiride (AMARYL) 2 MG tablet Take 1 tablet by mouth 2 times daily 9/25/18   Kathy Marie MD   triamterene-hydrochlorothiazide [facility]) 37.5-25 MG per tablet Take 1 tablet by mouth daily 9/25/18   Kathy Marie MD   levofloxacin (LEVAQUIN) 500 MG tablet  3/8/18   Historical Provider, MD   predniSONE (DELTASONE) 10 MG tablet TK 3 TS PO D FOR 2 DAYS THEN 2 TS D FOR 2 DAYS THEN 1 T D 4/6/18   Historical Provider, MD   fluticasone-vilanterol (BREO ELLIPTA) 100-25 MCG/INH AEPB inhaler Inhale 1 puff into the lungs daily 4/12/18   Kathy Marie MD   Umeclidinium Bromide (INCRUSE ELLIPTA) 62.5 MCG/INH AEPB Inhale 1 puff into the lungs 2 times daily 4/12/18   Kathy Marie MD   budesonide-formoterol [facility]) 80-4.5 MCG/ACT AERO Inhale 2 puffs into the lungs 2 times daily 2/6/18   Kathy Marie MD   diclofenac (CATAFLAM) 50 MG tablet TK 1 T PO BID WF PRN P 2/6/18   Kathy Marie MD   loratadine-pseudoephedrine (CLARITIN-D

## 2018-10-03 ENCOUNTER — CARE COORDINATION (OUTPATIENT)
Dept: CARE COORDINATION | Age: 63
End: 2018-10-03

## 2018-10-03 DIAGNOSIS — E11.9 TYPE 2 DIABETES MELLITUS WITHOUT COMPLICATION, WITHOUT LONG-TERM CURRENT USE OF INSULIN (HCC): Primary | ICD-10-CM

## 2018-10-16 ENCOUNTER — CARE COORDINATION (OUTPATIENT)
Dept: CARE COORDINATION | Age: 63
End: 2018-10-16

## 2018-11-05 DIAGNOSIS — I10 ESSENTIAL HYPERTENSION: ICD-10-CM

## 2018-11-05 RX ORDER — LISINOPRIL 5 MG/1
TABLET ORAL
Qty: 90 TABLET | Refills: 1 | Status: SHIPPED | OUTPATIENT
Start: 2018-11-05 | End: 2019-04-09 | Stop reason: SDUPTHER

## 2018-11-14 ENCOUNTER — CARE COORDINATION (OUTPATIENT)
Dept: CARE COORDINATION | Age: 63
End: 2018-11-14

## 2018-12-18 ENCOUNTER — CARE COORDINATION (OUTPATIENT)
Dept: CARE COORDINATION | Age: 63
End: 2018-12-18

## 2019-01-15 ENCOUNTER — CARE COORDINATION (OUTPATIENT)
Dept: CARE COORDINATION | Age: 64
End: 2019-01-15

## 2019-01-24 ENCOUNTER — CARE COORDINATION (OUTPATIENT)
Dept: CARE COORDINATION | Age: 64
End: 2019-01-24

## 2019-02-15 ENCOUNTER — CARE COORDINATION (OUTPATIENT)
Dept: CARE COORDINATION | Age: 64
End: 2019-02-15

## 2019-02-18 ENCOUNTER — CARE COORDINATION (OUTPATIENT)
Dept: CARE COORDINATION | Age: 64
End: 2019-02-18

## 2019-02-18 ENCOUNTER — OFFICE VISIT (OUTPATIENT)
Dept: PRIMARY CARE CLINIC | Age: 64
End: 2019-02-18
Payer: MEDICARE

## 2019-02-18 VITALS
BODY MASS INDEX: 39.55 KG/M2 | DIASTOLIC BLOOD PRESSURE: 78 MMHG | WEIGHT: 223.2 LBS | RESPIRATION RATE: 16 BRPM | HEIGHT: 63 IN | OXYGEN SATURATION: 94 % | SYSTOLIC BLOOD PRESSURE: 108 MMHG | HEART RATE: 107 BPM

## 2019-02-18 DIAGNOSIS — E11.9 TYPE 2 DIABETES MELLITUS WITHOUT COMPLICATION, WITHOUT LONG-TERM CURRENT USE OF INSULIN (HCC): ICD-10-CM

## 2019-02-18 DIAGNOSIS — J06.9 URI, ACUTE: ICD-10-CM

## 2019-02-18 DIAGNOSIS — M15.9 PRIMARY OSTEOARTHRITIS INVOLVING MULTIPLE JOINTS: ICD-10-CM

## 2019-02-18 DIAGNOSIS — R42 DIZZINESS: ICD-10-CM

## 2019-02-18 DIAGNOSIS — R55 SYNCOPE, UNSPECIFIED SYNCOPE TYPE: ICD-10-CM

## 2019-02-18 DIAGNOSIS — J42 CHRONIC BRONCHITIS, UNSPECIFIED CHRONIC BRONCHITIS TYPE (HCC): ICD-10-CM

## 2019-02-18 DIAGNOSIS — R05.9 COUGH: ICD-10-CM

## 2019-02-18 DIAGNOSIS — J42 CHRONIC BRONCHITIS, UNSPECIFIED CHRONIC BRONCHITIS TYPE (HCC): Primary | ICD-10-CM

## 2019-02-18 LAB
ALBUMIN SERPL-MCNC: 4 G/DL (ref 3.5–4.6)
ALP BLD-CCNC: 72 U/L (ref 40–130)
ALT SERPL-CCNC: 34 U/L (ref 0–33)
ANION GAP SERPL CALCULATED.3IONS-SCNC: 18 MEQ/L (ref 9–15)
AST SERPL-CCNC: 25 U/L (ref 0–35)
BASOPHILS ABSOLUTE: 0.1 K/UL (ref 0–0.2)
BASOPHILS RELATIVE PERCENT: 1 %
BILIRUB SERPL-MCNC: <0.2 MG/DL (ref 0.2–0.7)
BUN BLDV-MCNC: 7 MG/DL (ref 8–23)
CALCIUM SERPL-MCNC: 8.7 MG/DL (ref 8.5–9.9)
CHLORIDE BLD-SCNC: 91 MEQ/L (ref 95–107)
CO2: 29 MEQ/L (ref 20–31)
CREAT SERPL-MCNC: 0.73 MG/DL (ref 0.5–0.9)
EOSINOPHILS ABSOLUTE: 0.2 K/UL (ref 0–0.7)
EOSINOPHILS RELATIVE PERCENT: 2.2 %
GFR AFRICAN AMERICAN: >60
GFR NON-AFRICAN AMERICAN: >60
GLOBULIN: 3.5 G/DL (ref 2.3–3.5)
GLUCOSE BLD-MCNC: 264 MG/DL (ref 70–99)
GLUCOSE BLD-MCNC: 292 MG/DL
HBA1C MFR BLD: 8 %
HCT VFR BLD CALC: 48 % (ref 37–47)
HEMOGLOBIN: 16 G/DL (ref 12–16)
LYMPHOCYTES ABSOLUTE: 2.5 K/UL (ref 1–4.8)
LYMPHOCYTES RELATIVE PERCENT: 34.1 %
MCH RBC QN AUTO: 32.9 PG (ref 27–31.3)
MCHC RBC AUTO-ENTMCNC: 33.4 % (ref 33–37)
MCV RBC AUTO: 98.3 FL (ref 82–100)
MONOCYTES ABSOLUTE: 0.6 K/UL (ref 0.2–0.8)
MONOCYTES RELATIVE PERCENT: 7.8 %
NEUTROPHILS ABSOLUTE: 4 K/UL (ref 1.4–6.5)
NEUTROPHILS RELATIVE PERCENT: 54.9 %
PDW BLD-RTO: 13.9 % (ref 11.5–14.5)
PLATELET # BLD: 234 K/UL (ref 130–400)
POTASSIUM SERPL-SCNC: 4 MEQ/L (ref 3.4–4.9)
RBC # BLD: 4.88 M/UL (ref 4.2–5.4)
SODIUM BLD-SCNC: 138 MEQ/L (ref 135–144)
TOTAL PROTEIN: 7.5 G/DL (ref 6.3–8)
WBC # BLD: 7.4 K/UL (ref 4.8–10.8)

## 2019-02-18 PROCEDURE — 99214 OFFICE O/P EST MOD 30 MIN: CPT | Performed by: NURSE PRACTITIONER

## 2019-02-18 PROCEDURE — 94640 AIRWAY INHALATION TREATMENT: CPT | Performed by: NURSE PRACTITIONER

## 2019-02-18 PROCEDURE — 82962 GLUCOSE BLOOD TEST: CPT | Performed by: NURSE PRACTITIONER

## 2019-02-18 PROCEDURE — 83036 HEMOGLOBIN GLYCOSYLATED A1C: CPT | Performed by: NURSE PRACTITIONER

## 2019-02-18 RX ORDER — ALBUTEROL SULFATE 2.5 MG/3ML
2.5 SOLUTION RESPIRATORY (INHALATION) ONCE
Status: COMPLETED | OUTPATIENT
Start: 2019-02-18 | End: 2019-02-18

## 2019-02-18 RX ORDER — BENZONATATE 100 MG/1
100 CAPSULE ORAL 3 TIMES DAILY PRN
Qty: 30 CAPSULE | Refills: 0 | Status: SHIPPED | OUTPATIENT
Start: 2019-02-18 | End: 2019-08-01

## 2019-02-18 RX ORDER — LEVOFLOXACIN 500 MG/1
500 TABLET, FILM COATED ORAL DAILY
Qty: 10 TABLET | Refills: 0 | Status: SHIPPED | OUTPATIENT
Start: 2019-02-18 | End: 2019-02-28

## 2019-02-18 RX ADMIN — ALBUTEROL SULFATE 2.5 MG: 2.5 SOLUTION RESPIRATORY (INHALATION) at 10:15

## 2019-02-18 ASSESSMENT — ENCOUNTER SYMPTOMS
DYSPNEA ASSOCIATED WITH: MINIMAL EXERTION
SHORTNESS OF BREATH: 1
RHINORRHEA: 0
COUGH: 1
SORE THROAT: 0
WHEEZING: 1
VISUAL CHANGE: 0
BLURRED VISION: 0

## 2019-02-18 ASSESSMENT — PATIENT HEALTH QUESTIONNAIRE - PHQ9
SUM OF ALL RESPONSES TO PHQ9 QUESTIONS 1 & 2: 0
2. FEELING DOWN, DEPRESSED OR HOPELESS: 0
1. LITTLE INTEREST OR PLEASURE IN DOING THINGS: 0
SUM OF ALL RESPONSES TO PHQ QUESTIONS 1-9: 0
SUM OF ALL RESPONSES TO PHQ QUESTIONS 1-9: 0

## 2019-02-20 ENCOUNTER — CARE COORDINATION (OUTPATIENT)
Dept: CARE COORDINATION | Age: 64
End: 2019-02-20

## 2019-02-26 ENCOUNTER — TELEPHONE (OUTPATIENT)
Dept: PRIMARY CARE CLINIC | Age: 64
End: 2019-02-26

## 2019-02-26 ENCOUNTER — CARE COORDINATION (OUTPATIENT)
Dept: CARE COORDINATION | Age: 64
End: 2019-02-26

## 2019-02-26 DIAGNOSIS — T36.95XA ANTIBIOTIC-INDUCED YEAST INFECTION: Primary | ICD-10-CM

## 2019-02-26 DIAGNOSIS — B37.9 ANTIBIOTIC-INDUCED YEAST INFECTION: Primary | ICD-10-CM

## 2019-02-26 RX ORDER — FLUCONAZOLE 150 MG/1
150 TABLET ORAL ONCE
Qty: 2 TABLET | Refills: 0 | Status: SHIPPED | OUTPATIENT
Start: 2019-02-26 | End: 2019-02-26

## 2019-03-05 ENCOUNTER — CARE COORDINATION (OUTPATIENT)
Dept: CARE COORDINATION | Age: 64
End: 2019-03-05

## 2019-03-15 ENCOUNTER — CARE COORDINATION (OUTPATIENT)
Dept: CARE COORDINATION | Age: 64
End: 2019-03-15

## 2019-04-03 ENCOUNTER — CARE COORDINATION (OUTPATIENT)
Dept: CARE COORDINATION | Age: 64
End: 2019-04-03

## 2019-04-03 NOTE — CARE COORDINATION
CHW called patient for follow up to discussed concerns or any needs. Patient reports that she has some issues with keeping house cleaning. Patient reports that she is able to keep up with cooking and hygiene. Patient reports that she receives $63 in food stamps and also attends local food pantries. Informed patient that this CHW will call [facility] on aging to make a referral for in home support with house keeping. Will follow up with patient in one month.

## 2019-04-09 DIAGNOSIS — I10 ESSENTIAL HYPERTENSION: ICD-10-CM

## 2019-04-09 DIAGNOSIS — E11.9 TYPE 2 DIABETES MELLITUS WITHOUT COMPLICATION, WITHOUT LONG-TERM CURRENT USE OF INSULIN (HCC): ICD-10-CM

## 2019-04-10 RX ORDER — LISINOPRIL 5 MG/1
TABLET ORAL
Qty: 90 TABLET | Refills: 1 | Status: SHIPPED | OUTPATIENT
Start: 2019-04-10 | End: 2019-10-03 | Stop reason: SDUPTHER

## 2019-04-10 NOTE — TELEPHONE ENCOUNTER
Patient was last seen on 2-28-19  Last Prescribed   Lisinopril 11-5-18  Metformin 9-25-18    Medication is pending  Please approve or deny this request

## 2019-04-15 ENCOUNTER — TELEPHONE (OUTPATIENT)
Dept: FAMILY MEDICINE CLINIC | Age: 64
End: 2019-04-15

## 2019-04-15 ENCOUNTER — CARE COORDINATION (OUTPATIENT)
Dept: CARE COORDINATION | Age: 64
End: 2019-04-15

## 2019-04-15 NOTE — TELEPHONE ENCOUNTER
Patient has yeast infection and needs medication called into [facility] in [place]- Per [employer]

## 2019-04-16 DIAGNOSIS — J20.9 ACUTE BRONCHITIS, UNSPECIFIED ORGANISM: ICD-10-CM

## 2019-04-16 RX ORDER — FLUCONAZOLE 100 MG/1
100 TABLET ORAL DAILY
Qty: 7 TABLET | Refills: 1 | Status: SHIPPED | OUTPATIENT
Start: 2019-04-16 | End: 2019-04-25 | Stop reason: SDUPTHER

## 2019-04-16 NOTE — CARE COORDINATION
Received call from [address] [place] [address]. She reports yeast infection that has not responded to medication over the counter.  Message to

## 2019-04-25 ENCOUNTER — CARE COORDINATION (OUTPATIENT)
Dept: CARE COORDINATION | Age: 64
End: 2019-04-25

## 2019-04-25 ENCOUNTER — OFFICE VISIT (OUTPATIENT)
Dept: FAMILY MEDICINE CLINIC | Age: 64
End: 2019-04-25
Payer: MEDICARE

## 2019-04-25 VITALS
TEMPERATURE: 97.2 F | BODY MASS INDEX: 38.62 KG/M2 | DIASTOLIC BLOOD PRESSURE: 60 MMHG | HEART RATE: 109 BPM | WEIGHT: 218 LBS | HEIGHT: 63 IN | OXYGEN SATURATION: 90 % | RESPIRATION RATE: 16 BRPM | SYSTOLIC BLOOD PRESSURE: 118 MMHG

## 2019-04-25 DIAGNOSIS — J20.9 ACUTE BRONCHITIS, UNSPECIFIED ORGANISM: Primary | ICD-10-CM

## 2019-04-25 DIAGNOSIS — R19.7 DIARRHEA, UNSPECIFIED TYPE: ICD-10-CM

## 2019-04-25 PROCEDURE — 99214 OFFICE O/P EST MOD 30 MIN: CPT | Performed by: INTERNAL MEDICINE

## 2019-04-25 RX ORDER — OXYCODONE AND ACETAMINOPHEN 7.5; 325 MG/1; MG/1
TABLET ORAL
Refills: 0 | COMMUNITY
Start: 2019-04-16

## 2019-04-25 RX ORDER — CYCLOBENZAPRINE HCL 10 MG
TABLET ORAL
Refills: 0 | COMMUNITY
Start: 2019-03-06 | End: 2019-08-01

## 2019-04-25 RX ORDER — CEFUROXIME AXETIL 500 MG/1
500 TABLET ORAL 2 TIMES DAILY
Qty: 20 TABLET | Refills: 1 | Status: SHIPPED | OUTPATIENT
Start: 2019-04-25 | End: 2019-05-05

## 2019-04-25 RX ORDER — GABAPENTIN 400 MG/1
400 CAPSULE ORAL 2 TIMES DAILY
Refills: 0 | COMMUNITY
Start: 2019-04-05 | End: 2021-08-09

## 2019-04-25 RX ORDER — FLUCONAZOLE 100 MG/1
100 TABLET ORAL DAILY
Qty: 7 TABLET | Refills: 1 | Status: SHIPPED | OUTPATIENT
Start: 2019-04-25 | End: 2019-05-02

## 2019-04-25 NOTE — PROGRESS NOTES
Heather Sharma 60 y.o. female presents today with   Chief Complaint   Patient presents with    Cough     PT. is here today for productive cough with yellow-white phlem, nasal congestion, chest congestion, chills, no ear pain/pressure/fullness, diarrhea x September on and off.  Diabetes     Pt. is here today for a follow up on DM, she checks sugar at home, 170 this morning. Last a1c was done 2/18/19 at 8. She currently takes metformin with moderate relief, no side effects to report at this time. Cough   This is a new problem. The current episode started in the past 7 days. The problem has been unchanged. The problem occurs every few minutes. The cough is non-productive. Associated symptoms include rhinorrhea. Pertinent negatives include no chest pain, chills, fever or rash. Diarrhea    This is a recurrent problem. The current episode started 1 to 4 weeks ago. The problem occurs less than 2 times per day. The problem has been waxing and waning. The stool consistency is described as watery. Associated symptoms include coughing. Pertinent negatives include no abdominal pain, chills, fever or URI.        Past Medical History:   Diagnosis Date    CAD (coronary artery disease)     2 stent 2008 [facility]    COPD (chronic obstructive pulmonary disease) ([address]     Diverticulitis     Heart palpitations 10/12/2017    Smoker     Type II or unspecified type diabetes mellitus without mention of complication, not stated as uncontrolled      Patient Active Problem List    Diagnosis Date Noted    Heart palpitations 10/12/2017    COPD (chronic obstructive pulmonary disease) (HCC)     Type 2 diabetes mellitus without complication, without long-term current use of insulin (HCC)     CAD (coronary artery disease)     Acute encephalopathy 10/10/2013    Hyperlipidemia 08/29/2013    Osteoarthritis 08/20/2013    Osteoarthrosis, unspecified whether generalized or localized, pelvic region and thigh 08/20/2013     Past Surgical History:   Procedure Laterality Date    COLON SURGERY      from diverticulitis    CORONARY ANGIOPLASTY WITH STENT PLACEMENT  2003    HERNIA REPAIR      HYSTERECTOMY      TONSILLECTOMY      TOTAL HIP ARTHROPLASTY       No family history on file. Social History     Socioeconomic History    Marital status:      Spouse name: None    Number of children: None    Years of education: None    Highest education level: None   Occupational History    None   Social Needs    Financial resource strain: None    Food insecurity:     Worry: None     Inability: None    Transportation needs:     Medical: None     Non-medical: None   Tobacco Use    Smoking status: Former Smoker     Packs/day: 0.50     Years: 30.00     Pack years: 15.00     Types: E-Cigarettes     Last attempt to quit: 12/29/2015     Years since quitting: 3.3    Smokeless tobacco: Never Used   Substance and Sexual Activity    Alcohol use: Yes     Alcohol/week: 0.0 oz     Comment: rare    Drug use: No    Sexual activity: Never   Lifestyle    Physical activity:     Days per week: None     Minutes per session: None    Stress: None   Relationships    Social connections:     Talks on phone: None     Gets together: None     Attends Bahai service: None     Active member of club or organization: None     Attends meetings of clubs or organizations: None     Relationship status: None    Intimate partner violence:     Fear of current or ex partner: None     Emotionally abused: None     Physically abused: None     Forced sexual activity: None   Other Topics Concern    None   Social History Narrative    None     Allergies   Allergen Reactions    Codeine Hives    Invokana [Canagliflozin]      Yeast infection     Penicillins Hives    Strawberry Extract Hives    Vicodin [Hydrocodone-Acetaminophen] Hives       Review of Systems   Constitutional: Negative for chills and fever. HENT: Positive for rhinorrhea.  Negative for facial swelling and nosebleeds. Eyes: Negative for photophobia and visual disturbance. Respiratory: Positive for cough. Negative for apnea and choking. Cardiovascular: Negative for chest pain and palpitations. Gastrointestinal: Positive for diarrhea. Negative for abdominal distention, abdominal pain and blood in stool. Genitourinary: Negative for enuresis, hematuria and vaginal bleeding. Musculoskeletal: Negative for gait problem and joint swelling. Skin: Negative for rash. Neurological: Negative for syncope and speech difficulty. Hematological: Does not bruise/bleed easily. Psychiatric/Behavioral: Negative for hallucinations and suicidal ideas. Vitals:    04/25/19 0819   BP: 118/60   Pulse: 109   Resp: 16   Temp: 97.2 °F (36.2 °C)   TempSrc: Temporal   SpO2: 90%   Weight: 218 lb (98.9 kg)   Height: 5' 2.5\" (1.588 m)       Physical Exam   Constitutional: She is oriented to person, place, and time. She appears well-developed and well-nourished. HENT:   Head: Normocephalic and atraumatic. Eyes: Pupils are equal, round, and reactive to light. EOM are normal.   Neck: Normal range of motion. No thyromegaly present. Cardiovascular: Normal rate, regular rhythm and normal heart sounds. Pulmonary/Chest: Breath sounds normal. No respiratory distress. Abdominal: Soft. She exhibits no distension. Musculoskeletal: Normal range of motion. Neurological: She is oriented to person, place, and time. Psychiatric: She has a normal mood and affect. Assessment/Plan  Cruzito Davenport was seen today for cough and diabetes. Diagnoses and all orders for this visit:    Acute bronchitis, unspecified organism  -     fluconazole (DIFLUCAN) 100 MG tablet; Take 1 tablet by mouth daily for 7 days  -     cefUROXime (CEFTIN) 500 MG tablet; Take 1 tablet by mouth 2 times daily for 10 days    Diarrhea, unspecified type  -     C Diff Toxin B By Rt PCR; Future  -     Culture Stool;  Future        No follow-ups on file.    Shukri Tripathi MD

## 2019-04-29 ENCOUNTER — CARE COORDINATION (OUTPATIENT)
Dept: CARE COORDINATION | Age: 64
End: 2019-04-29

## 2019-04-29 NOTE — CARE COORDINATION
Ambulatory Care Coordination Note  4/25/2019  CM Risk Score: 6  Bao Mortality Risk Score:      ACC: Landen Baltazar RN    Summary Note: met with harinder at dr visit. She reports no recent decline and has been feeling well. Will d/c from care coordination due to she is comfortable in the management of her health. She is in agreement  Diabetes Assessment    Medic Alert ID:  No  Meal Planning:  Avoidance of concentrated sweets   How often do you test your blood sugar?:  Daily   Do you have barriers with adherence to non-pharmacologic self-management interventions? (Nutrition/Exercise/Self-Monitoring):  Yes   Have you ever had to go to the ED for symptoms of low blood sugar?:  No       No patient-reported symptoms       and   COPD Assessment    Does the patient understand envrionmental exposure?:  Yes  Is the patient able to verbalize Rescue vs. Long Acting medications?:  Yes  Does the patient have a nebulizer?:  Yes  Does the patient use a space with inhaled medications?:  No     No patient-reported symptoms         Symptoms:      Have you had a recent diagnosis of pneumonia either by PCP or at a hospital?:  No             Care Coordination Interventions    Program Enrollment:  Rising Risk  Referral from Primary Care Provider:  No  Suggested Interventions and Community Resources  Fall Risk Prevention:  Completed  Medication Assistance Program:  Completed (Comment: xtra help)  Social Work:  Completed  Zone Management Tools:  Completed         Goals Addressed                 This Visit's Progress     COMPLETED: Conditions and Symptoms        I will follow my Zone Management tool to seek urgent or emergent care. I will notify my provider of any symptoms that indicate a worsening of my condition.     Barriers: financial  Plan for overcoming my barriers: will consult msw  Confidence: 6/10  Anticipated Goal Completion Date: 4/30/2019  11/15/2017 co congestion  11/20/2017 co ear pain and continued cough  2/15/2019 co Shant Bahena MD   ipratropium-albuterol (DUONEB) 0.5-2.5 (3) MG/3ML SOLN nebulizer solution Take 3 mLs by nebulization every 6 hours as needed for Shortness of Breath 9/25/18   Eh Hagan MD   glimepiride (AMARYL) 2 MG tablet Take 1 tablet by mouth 2 times daily 9/25/18   Eh Hagan MD   triamterene-hydrochlorothiazide [facility]) 37.5-25 MG per tablet Take 1 tablet by mouth daily 9/25/18   Eh Hagan MD   fluticasone-vilanterol (BREO ELLIPTA) 100-25 MCG/INH AEPB inhaler Inhale 1 puff into the lungs daily 4/12/18   Eh Hagan MD   Umeclidinium Bromide (INCRUSE ELLIPTA) 62.5 MCG/INH AEPB Inhale 1 puff into the lungs 2 times daily 4/12/18   Eh Hagan MD   diclofenac (CATAFLAM) 50 MG tablet TK 1 T PO BID WF PRN P 2/6/18   Eh Hagan MD   Blood Glucose Monitoring Suppl (ONE TOUCH ULTRA 2) W/DEVICE KIT 1 kit by Does not apply route daily as needed 4/25/15   Massiel Mosley MD   LANCETS MICRO THIN 33G [address] [facility]  4/20/15   Historical Provider, MD   OXYGEN Inhale 2 L into the lungs daily. [facility]    Historical Provider, MD       No future appointments.

## 2019-04-30 ENCOUNTER — CARE COORDINATION (OUTPATIENT)
Dept: CARE COORDINATION | Age: 64
End: 2019-04-30

## 2019-04-30 ASSESSMENT — ENCOUNTER SYMPTOMS
RHINORRHEA: 1
DIARRHEA: 1
ABDOMINAL DISTENTION: 0
BLOOD IN STOOL: 0
COUGH: 1
CHOKING: 0
APNEA: 0
FACIAL SWELLING: 0
PHOTOPHOBIA: 0
ABDOMINAL PAIN: 0

## 2019-05-03 ENCOUNTER — CARE COORDINATION (OUTPATIENT)
Dept: CARE COORDINATION | Age: 64
End: 2019-05-03

## 2019-05-06 ENCOUNTER — CARE COORDINATION (OUTPATIENT)
Dept: CARE COORDINATION | Age: 64
End: 2019-05-06

## 2019-05-06 NOTE — CARE COORDINATION
Name: Aminta Comer    ### Patient Details  YOB: 1955  MRN: [id number]    ### Related interaction  COPD-Diabetes Low Touch UA (COPD-Diabetes Low Touch UA) ([email or web address])    ### Questions     Question 1   Legs and Feet   Please press 1 for yes. Please press 2 for no. Symptoms on Feet (Issue Panel: High Priority COPD Diabetes)    ### Required Interventions and Feedback     Call Status         *Call Status (Required):     Patient Reached    Call status details:     Called and spoke to patient. she states her feet are very swollen over the past 2 days. Her dizziness is worse. Denies SOB or any weight gain. states has lost 12# in past month on Keto diet. Is taking Maxide 37.5/25 mg. daily. adhering to low NA diet and fluid restriction. Instructed to continue elevating legs and call PCP to with above symptoms. Patient expresses understanding. Pre-Call Opportunity Review         Low Priority :     Yes     General Symptoms and Assessment   These symptoms may apply to all patients with all diseases. Dizziness:     Yes    Swelling:     Yes     Diabetes Symptoms and Assessment         Symptom Free Text Details:     No diabetic symptoms. COPD Symptoms and Assessment          Nebulizer:     Has nebulizer     Interventions Provided         Education:     Disease Management    [facility].  [address] [place] [address], [address] [place] [address] Nurse /

## 2019-05-10 ENCOUNTER — CARE COORDINATION (OUTPATIENT)
Dept: CARE COORDINATION | Age: 64
End: 2019-05-10

## 2019-05-17 ENCOUNTER — CARE COORDINATION (OUTPATIENT)
Dept: CARE COORDINATION | Age: 64
End: 2019-05-17

## 2019-05-17 NOTE — CARE COORDINATION
Called for follow up with patient. Inquired if patient has heard from [facility] for housekeeping. Discussed current needs at this time. Patient reports she has not received any information from office on aging. Provided patient with office on aging phone number. Patient reports that her only concern is that she has a prescription for a rollator and has not been able to use it because [facility] not  accept her insurance. Recommend patient to call Bob Penaloza in [facility] as they may accept her insurance. Also recommended that patient call her insurance company to inquire if they may have information on how or where she can get rollator covered. Patient request to have [facility] call her back for another follow up in 2-3 weeks.

## 2019-05-21 ENCOUNTER — OFFICE VISIT (OUTPATIENT)
Dept: FAMILY MEDICINE CLINIC | Age: 64
End: 2019-05-21
Payer: MEDICARE

## 2019-05-21 VITALS
SYSTOLIC BLOOD PRESSURE: 92 MMHG | HEIGHT: 63 IN | DIASTOLIC BLOOD PRESSURE: 64 MMHG | RESPIRATION RATE: 14 BRPM | OXYGEN SATURATION: 92 % | BODY MASS INDEX: 38.8 KG/M2 | HEART RATE: 112 BPM | WEIGHT: 219 LBS | TEMPERATURE: 99.2 F

## 2019-05-21 DIAGNOSIS — L55.9 SUNBURN: Primary | ICD-10-CM

## 2019-05-21 DIAGNOSIS — L56.8 PHOTOSENSITIVITY DUE TO SUN: ICD-10-CM

## 2019-05-21 PROCEDURE — 3017F COLORECTAL CA SCREEN DOC REV: CPT | Performed by: NURSE PRACTITIONER

## 2019-05-21 PROCEDURE — 99213 OFFICE O/P EST LOW 20 MIN: CPT | Performed by: NURSE PRACTITIONER

## 2019-05-21 PROCEDURE — G8417 CALC BMI ABV UP PARAM F/U: HCPCS | Performed by: NURSE PRACTITIONER

## 2019-05-21 PROCEDURE — G8427 DOCREV CUR MEDS BY ELIG CLIN: HCPCS | Performed by: NURSE PRACTITIONER

## 2019-05-21 PROCEDURE — 1036F TOBACCO NON-USER: CPT | Performed by: NURSE PRACTITIONER

## 2019-05-21 PROCEDURE — 96372 THER/PROPH/DIAG INJ SC/IM: CPT | Performed by: NURSE PRACTITIONER

## 2019-05-21 PROCEDURE — G8599 NO ASA/ANTIPLAT THER USE RNG: HCPCS | Performed by: NURSE PRACTITIONER

## 2019-05-21 RX ORDER — METHYLPREDNISOLONE ACETATE 80 MG/ML
80 INJECTION, SUSPENSION INTRA-ARTICULAR; INTRALESIONAL; INTRAMUSCULAR; SOFT TISSUE ONCE
Status: COMPLETED | OUTPATIENT
Start: 2019-05-21 | End: 2019-05-21

## 2019-05-21 RX ADMIN — METHYLPREDNISOLONE ACETATE 80 MG: 80 INJECTION, SUSPENSION INTRA-ARTICULAR; INTRALESIONAL; INTRAMUSCULAR; SOFT TISSUE at 12:37

## 2019-05-21 NOTE — PROGRESS NOTES
Subjective:      Patient ID: Lizet Chappell is a 60 y.o. female who presents today for:     Chief Complaint   Patient presents with    Other     Since saturday sunburn and eyes are swollen       HPI    Patient states that she went to [facility] for mothers day. Got sunburn and has swelling around the eyes ever since. Has some tenderness around the eyes from the swelling. Denies vision problems or HA. Past Medical History:   Diagnosis Date    CAD (coronary artery disease)     2 stent [facility]    COPD (chronic obstructive pulmonary disease) ([address]     Diverticulitis     Heart palpitations 10/12/2017    Smoker     Type II or unspecified type diabetes mellitus without mention of complication, not stated as uncontrolled      Past Surgical History:   Procedure Laterality Date    COLON SURGERY      from diverticulitis    CORONARY ANGIOPLASTY WITH STENT PLACEMENT  2003    HERNIA REPAIR      HYSTERECTOMY      TONSILLECTOMY      TOTAL HIP ARTHROPLASTY       No family history on file. Social History     Socioeconomic History    Marital status:      Spouse name: Not on file    Number of children: Not on file    Years of education: Not on file    Highest education level: Not on file   Occupational History    Not on file   Social Needs    Financial resource strain: Not on file    Food insecurity:     Worry: Not on file     Inability: Not on file    Transportation needs:     Medical: Not on file     Non-medical: Not on file   Tobacco Use    Smoking status: Former Smoker     Packs/day: 0.50     Years: 30.00     Pack years: 15.00     Types: E-Cigarettes     Last attempt to quit: 12/29/2015     Years since quitting: 3.3    Smokeless tobacco: Never Used   Substance and Sexual Activity    Alcohol use:  Yes     Alcohol/week: 0.0 oz     Comment: rare    Drug use: No    Sexual activity: Never   Lifestyle    Physical activity:     Days per week: Not on file     Minutes per session: Not on file    Stress: Not on file   Relationships    Social connections:     Talks on phone: Not on file     Gets together: Not on file     Attends Hoahaoism service: Not on file     Active member of club or organization: Not on file     Attends meetings of clubs or organizations: Not on file     Relationship status: Not on file    Intimate partner violence:     Fear of current or ex partner: Not on file     Emotionally abused: Not on file     Physically abused: Not on file     Forced sexual activity: Not on file   Other Topics Concern    Not on file   Social History Narrative    Not on file     Current Outpatient Medications on File Prior to Visit   Medication Sig Dispense Refill    oxyCODONE-acetaminophen (PERCOCET) 7.5-325 MG per tablet TAKE 1/2 (ONE-HALF) OF A TABLET BY MOUTH THREE TIMES DAILY AS DIRECTED for 20 days  0    gabapentin (NEURONTIN) 400 MG capsule TAKE 1 CAPSULE BY MOUTH THREE TIMES DAILY AS NEEDED  0    cyclobenzaprine (FLEXERIL) 10 MG tablet TAKE 1 TABLET BY MOUTH TWICE DAILY AS NEEDED, may take 1/2 (one-half) if 1 tablet is too sedating  0    metFORMIN (GLUCOPHAGE) 1000 MG tablet TAKE 1 TABLET BY MOUTH twice a day 180 tablet 1    lisinopril (PRINIVIL;ZESTRIL) 5 MG tablet TAKE 1 TABLET BY MOUTH DAILY 90 tablet 1    Handicap Placard MISC by Does not apply route 1 each 0    benzonatate (TESSALON) 100 MG capsule Take 1 capsule by mouth 3 times daily as needed for Cough 30 capsule 0    ONE TOUCH LANCETS MISC 1 each by Does not apply route daily 100 each 3    ibuprofen (ADVIL;MOTRIN) 600 MG tablet TK 1 T PO TID AFTER MEALS PRF PAIN  0    blood glucose test strips (ONE TOUCH ULTRA TEST) strip 1 each by Subdermal route daily 100 each 5    famotidine (PEPCID) 40 MG tablet Take 1 tablet by mouth every evening 90 tablet 3    ipratropium-albuterol (DUONEB) 0.5-2.5 (3) MG/3ML SOLN nebulizer solution Take 3 mLs by nebulization every 6 hours as needed for Shortness of Breath 360 mL 3    glimepiride (AMARYL) Sunburn     2. Photosensitivity due to sun           Plan:       No orders of the defined types were placed in this encounter. Orders Placed This Encounter   Medications    methylPREDNISolone acetate (DEPO-MEDROL) injection 80 mg     Discussed that sunburn can occur even when it is not jackie out and to use sunblock. Steroid injection should bring down the swelling and redness. Let me know asap if symptoms not improving or if worsening. Side effects, adverse effects of the medication prescribed today, as well as treatment plan and result expectations have been discussed with the patient who expresses understanding and desires to proceed.     Close follow up to evaluate treatment results and for coordination of care. I have reviewed the patient's medical history in detail and updated the computerized patient record. Return if symptoms worsen or fail to improve.     Lakeisha Arrieta, APRN - CNP

## 2019-06-10 ENCOUNTER — CARE COORDINATION (OUTPATIENT)
Dept: CARE COORDINATION | Age: 64
End: 2019-06-10

## 2019-08-01 ENCOUNTER — OFFICE VISIT (OUTPATIENT)
Dept: FAMILY MEDICINE CLINIC | Age: 64
End: 2019-08-01
Payer: MEDICARE

## 2019-08-01 ENCOUNTER — HOSPITAL ENCOUNTER (OUTPATIENT)
Dept: GENERAL RADIOLOGY | Age: 64
Discharge: HOME | End: 2019-08-03
Payer: MEDICARE

## 2019-08-01 VITALS
HEIGHT: 63 IN | RESPIRATION RATE: 15 BRPM | HEART RATE: 109 BPM | SYSTOLIC BLOOD PRESSURE: 130 MMHG | WEIGHT: 211.6 LBS | TEMPERATURE: 97.5 F | DIASTOLIC BLOOD PRESSURE: 80 MMHG | OXYGEN SATURATION: 96 % | BODY MASS INDEX: 37.49 KG/M2

## 2019-08-01 DIAGNOSIS — M47.816 SPONDYLOSIS OF LUMBAR REGION WITHOUT MYELOPATHY OR RADICULOPATHY: ICD-10-CM

## 2019-08-01 DIAGNOSIS — E11.9 ENCOUNTER FOR DIABETIC FOOT EXAM (HCC): Primary | ICD-10-CM

## 2019-08-01 DIAGNOSIS — Z00.00 ROUTINE GENERAL MEDICAL EXAMINATION AT A HEALTH CARE FACILITY: ICD-10-CM

## 2019-08-01 DIAGNOSIS — Z12.31 ENCOUNTER FOR SCREENING MAMMOGRAM FOR BREAST CANCER: ICD-10-CM

## 2019-08-01 DIAGNOSIS — Z13.220 SCREENING FOR HYPERLIPIDEMIA: ICD-10-CM

## 2019-08-01 DIAGNOSIS — R19.7 DIARRHEA, UNSPECIFIED TYPE: ICD-10-CM

## 2019-08-01 DIAGNOSIS — Z12.11 ENCOUNTER FOR SCREENING COLONOSCOPY: ICD-10-CM

## 2019-08-01 PROCEDURE — 72072 X-RAY EXAM THORAC SPINE 3VWS: CPT

## 2019-08-01 PROCEDURE — G0438 PPPS, INITIAL VISIT: HCPCS | Performed by: NURSE PRACTITIONER

## 2019-08-01 PROCEDURE — 72110 X-RAY EXAM L-2 SPINE 4/>VWS: CPT

## 2019-08-01 PROCEDURE — 3045F PR MOST RECENT HEMOGLOBIN A1C LEVEL 7.0-9.0%: CPT | Performed by: NURSE PRACTITIONER

## 2019-08-01 PROCEDURE — G8599 NO ASA/ANTIPLAT THER USE RNG: HCPCS | Performed by: NURSE PRACTITIONER

## 2019-08-01 PROCEDURE — 3017F COLORECTAL CA SCREEN DOC REV: CPT | Performed by: NURSE PRACTITIONER

## 2019-08-01 ASSESSMENT — LIFESTYLE VARIABLES
HOW OFTEN DURING THE LAST YEAR HAVE YOU NEEDED AN ALCOHOLIC DRINK FIRST THING IN THE MORNING TO GET YOURSELF GOING AFTER A NIGHT OF HEAVY DRINKING: 0
HOW OFTEN DURING THE LAST YEAR HAVE YOU BEEN UNABLE TO REMEMBER WHAT HAPPENED THE NIGHT BEFORE BECAUSE YOU HAD BEEN DRINKING: 0
HOW OFTEN DO YOU HAVE A DRINK CONTAINING ALCOHOL: 1
HOW OFTEN DO YOU HAVE SIX OR MORE DRINKS ON ONE OCCASION: 0
HAS A RELATIVE, FRIEND, DOCTOR, OR ANOTHER HEALTH PROFESSIONAL EXPRESSED CONCERN ABOUT YOUR DRINKING OR SUGGESTED YOU CUT DOWN: 0
HOW OFTEN DURING THE LAST YEAR HAVE YOU FAILED TO DO WHAT WAS NORMALLY EXPECTED FROM YOU BECAUSE OF DRINKING: 0
HOW OFTEN DURING THE LAST YEAR HAVE YOU FOUND THAT YOU WERE NOT ABLE TO STOP DRINKING ONCE YOU HAD STARTED: 0
AUDIT TOTAL SCORE: 2
HOW MANY STANDARD DRINKS CONTAINING ALCOHOL DO YOU HAVE ON A TYPICAL DAY: 1
HOW OFTEN DURING THE LAST YEAR HAVE YOU HAD A FEELING OF GUILT OR REMORSE AFTER DRINKING: 0
HAVE YOU OR SOMEONE ELSE BEEN INJURED AS A RESULT OF YOUR DRINKING: 0
AUDIT-C TOTAL SCORE: 2

## 2019-08-01 ASSESSMENT — PATIENT HEALTH QUESTIONNAIRE - PHQ9
SUM OF ALL RESPONSES TO PHQ QUESTIONS 1-9: 0
SUM OF ALL RESPONSES TO PHQ QUESTIONS 1-9: 0

## 2019-08-01 NOTE — PROGRESS NOTES
Medicare Annual Wellness Visit  Name: Rosa Villanueva Date: 2019   MRN: [id number] Sex: Female   Age: 60 y.o. Ethnicity: Non-/Non    : 1955 Race: Lise Bobby is here for Medicare AWV (Patient present today with her medicare annual wellness visit. )    Screenings for behavioral, psychosocial and functional/safety risks, and cognitive dysfunction are all negative except as indicated below. These results, as well as other patient data from the [address] [facility] [place] [address] form, are documented in Flowsheets linked to this Encounter. Allergies   Allergen Reactions    Codeine Hives    Invokana [Canagliflozin]      Yeast infection     Penicillins Hives    Strawberry Extract Hives    Vicodin [Hydrocodone-Acetaminophen] Hives     Prior to Visit Medications    Medication Sig Taking?  Authorizing Provider   oxyCODONE-acetaminophen (PERCOCET) 7.5-325 MG per tablet TAKE 1/2 (ONE-HALF) OF A TABLET BY MOUTH THREE TIMES DAILY AS DIRECTED for 20 days Yes Historical Provider, MD   gabapentin (NEURONTIN) 400 MG capsule TAKE 1 CAPSULE BY MOUTH THREE TIMES DAILY AS NEEDED Yes Historical Provider, MD   cyclobenzaprine (FLEXERIL) 10 MG tablet TAKE 1 TABLET BY MOUTH TWICE DAILY AS NEEDED, may take 1/2 (one-half) if 1 tablet is too sedating Yes Historical Provider, MD   metFORMIN (GLUCOPHAGE) 1000 MG tablet TAKE 1 TABLET BY MOUTH twice a day Yes Myrtle Parada MD   lisinopril (PRINIVIL;ZESTRIL) 5 MG tablet TAKE 1 TABLET BY MOUTH DAILY Yes Myrtle Parada MD   Handicap Placard [address] [facility] by Does not apply route Yes Leslie Munoz Cons, APRN - CNP   benzonatate (TESSALON) 100 MG capsule Take 1 capsule by mouth 3 times daily as needed for Cough Yes Leslie Munoz Cons, APRN - CNP   ONE TOUCH LANCETS MISC 1 each by Does not apply route daily Yes Myrtle Parada MD   ibuprofen (ADVIL;MOTRIN) 600 MG tablet TK 1 T PO TID AFTER MEALS PRF PAIN Yes Historical Provider, MD   blood glucose test strips (ONE TOUCH ULTRA TEST) strip 1 each by Subdermal route daily Yes Matthieu Spencer MD   famotidine (PEPCID) 40 MG tablet Take 1 tablet by mouth every evening Yes Matthieu Spencer MD   ipratropium-albuterol (DUONEB) 0.5-2.5 (3) MG/3ML SOLN nebulizer solution Take 3 mLs by nebulization every 6 hours as needed for Shortness of Breath Yes Matthieu Spencer MD   glimepiride (AMARYL) 2 MG tablet Take 1 tablet by mouth 2 times daily Yes Matthieu Spencer MD   triamterene-hydrochlorothiazide (MAXZIDE-25) 37.5-25 MG per tablet Take 1 tablet by mouth daily Yes Matthieu Spencer MD   fluticasone-vilanterol (BREO ELLIPTA) 100-25 MCG/INH AEPB inhaler Inhale 1 puff into the lungs daily Yes Matthieu Spencer MD   Umeclidinium Bromide (INCRUSE ELLIPTA) 62.5 MCG/INH AEPB Inhale 1 puff into the lungs 2 times daily Yes Matthieu Spencer MD   diclofenac (CATAFLAM) 50 MG tablet TK 1 T PO BID WF PRN P Yes Matthieu Spencer MD   Blood Glucose Monitoring Suppl (ONE TOUCH ULTRA 2) W/DEVICE KIT 1 kit by Does not apply route daily as needed Yes Mary Carmen Sheriff MD   LANCETS MICRO THIN 33G [address] [facility]  Yes Historical Provider, MD   OXYGEN Inhale 2 L into the lungs daily. [facility] Yes Historical Provider, MD   DULoxetine (CYMBALTA) 60 MG extended release capsule Take 1 capsule by mouth every evening  Matthieu Spencer MD     Past Medical History:   Diagnosis Date    CAD (coronary artery disease)     2 stent 2008 [facility]    COPD (chronic obstructive pulmonary disease) [address] [address]     Diverticulitis     Heart palpitations 10/12/2017    Smoker     Type II or unspecified type diabetes mellitus without mention of complication, not stated as uncontrolled      Past Surgical History:   Procedure Laterality Date    COLON SURGERY      from diverticulitis    CORONARY ANGIOPLASTY WITH STENT PLACEMENT  2003    HERNIA REPAIR      HYSTERECTOMY      TONSILLECTOMY      TOTAL HIP ARTHROPLASTY       No family history on file.     CareTeam (Including outside providers/suppliers regularly involved in providing care):

## 2019-08-01 NOTE — PATIENT INSTRUCTIONS
want or do not want. What should you think about when choosing a health care agent? Choose your health care agent carefully. This person may or may not be a family member. Talk to the person before you make your final decision. Make sure he or she is comfortable with this responsibility. It's a good idea to choose someone who:  · Is at least 25years old. · Knows you well and understands what makes life meaningful for you. · Understands your Sabianism and moral values. · Will do what you want, not what he or she wants. · Will be able to make difficult choices at a stressful time. · Will be able to refuse or stop treatment, if that is what you would want, even if you could die. · Will be firm and confident with health professionals if needed. · Will ask questions to get necessary information. · Lives near you or agrees to travel to you if needed. Your family may help you make medical decisions while you can still be part of that process. But it is important to choose one person to be your health care agent in case you are not able to make decisions for yourself. If you don't fill out the legal form and name a health care agent, the decisions your family can make may be limited. Who will make decisions for you if you do not have a health care agent? If you don't have a health care agent or a living will, your family members may disagree about your medical care. And then some medical professionals who may not know you as well might have to make decisions for you. In some cases, a  makes the decisions. When you name a health care agent, it is very clear who has the power to make health decisions for you. How do you name a health care agent? You name your health care agent on a legal form. It is usually called a durable power of  for health care. Ask your hospital, state bar association, or office on aging where to find these forms.   You must sign the form to make it legal. Some states breathe on your own, your heart stops, or you're unable to swallow. · What things would you still want to be able to do after you receive life-support methods? Would you want to be able to walk? To speak? To eat on your own? To live without the help of machines? · If you have a choice, where do you want to be cared for? In your home? At a hospital or nursing home? · Do you want certain Alevism practices performed if you become very ill? · If you have a choice at the end of your life, where would you prefer to die? At home? In a hospital or nursing home? Somewhere else? · Would you prefer to be buried or cremated? · Do you want your organs to be donated after you die? What should you do with your living will? · Make sure that your family members and your health care agent have copies of your living will. · Give your doctor a copy of your living will to keep in your medical record. If you have more than one doctor, make sure that each one has a copy. · You may want to put a copy of your living will where it can be easily found. Where can you learn more? Go to https://EffektifpeeMagin.[employer]. org and sign in to your [employer] account. Enter C609 in the K[employer] box to learn more about \"Learning About Living Justin Russell. \"     If you do not have an account, please click on the \"Sign Up Now\" link. Current as of: April 18, 2018  Content Version: 12.0  © 2350-9289 Healthwise, Incorporated. Care instructions adapted under license by [facility] ([facility]). If you have questions about a medical condition or this instruction, always ask your healthcare professional. [address] any warranty or liability for your use of this information. Body Mass Index: Care Instructions  Your Care Instructions    Body mass index (BMI) can help you see if your weight is raising your risk for health problems. It uses a formula to compare how much you weigh with how tall you are.   · A BMI lower or ½ cup of cooked rice, cooked pasta, or cooked cereal.  ? 2½ cups of vegetables, especially:  § Dark-green vegetables such as broccoli and spinach. § Orange vegetables such as carrots and sweet potatoes. § Dry beans (such as daly and kidney beans) and peas (such as lentils). ? 2 cups of fresh, frozen, or canned fruit. A small apple or 1 banana or orange equals 1 cup. ? 3 cups of nonfat or low-fat milk, yogurt, or other milk products. ? 5½ ounces of meat and beans, such as chicken, fish, lean meat, beans, nuts, and seeds. One egg, 1 tablespoon of peanut butter, ½ ounce nuts or seeds, or ¼ cup of cooked beans equals 1 ounce of meat. · Learn how to read food labels for serving sizes and ingredients. Fast-food and convenience-food meals often contain few or no fruits or vegetables. Make sure you eat some fruits and vegetables to make the meal more nutritious. · Look at your food diary. For each food group, add up what you have eaten and then divide the total by the number of days. This will give you an idea of how much you are eating from each food group. See if you can find some ways to change your diet to make it more healthy. Start small  · Do not try to make dramatic changes to your diet all at once. You might feel that you are missing out on your favorite foods and then be more likely to fail. · Start slowly, and gradually change your habits. Try some of the following:  ? Use whole wheat bread instead of white bread. ? Use nonfat or low-fat milk instead of whole milk. ? Eat brown rice instead of white rice, and eat whole wheat pasta instead of white-flour pasta. ? Try low-fat cheeses and low-fat yogurt. ? Add more fruits and vegetables to meals and have them for snacks. ? Add lettuce, tomato, cucumber, and onion to sandwiches. ? Add fruit to yogurt and cereal.  Enjoy food  · You can still eat your favorite foods. You just may need to eat less of them.  If your favorite foods are high in fat, salt,

## 2019-08-07 ENCOUNTER — TELEPHONE (OUTPATIENT)
Dept: ENDOSCOPY | Age: 64
End: 2019-08-07

## 2019-08-19 ENCOUNTER — ANESTHESIA EVENT (OUTPATIENT)
Dept: ENDOSCOPY | Age: 64
End: 2019-08-19
Payer: MEDICARE

## 2019-08-19 NOTE — ANESTHESIA PRE PROCEDURE
[Hydrocodone-Acetaminophen] Hives       Problem List:    Patient Active Problem List   Diagnosis Code    COPD (chronic obstructive pulmonary disease) ([facility] [address] J44.9    Type 2 diabetes mellitus without complication, without long-term current use of insulin (HCC) E11.9    CAD (coronary artery disease) I25.10    Hyperlipidemia E78.5    Osteoarthritis M19.90    Heart palpitations R00.2    Acute encephalopathy G93.40    Osteoarthrosis, unspecified whether generalized or localized, pelvic region and thigh M16.9       Past Medical History:        Diagnosis Date    CAD (coronary artery disease)     2 stent 2008 [facility]    COPD (chronic obstructive pulmonary disease) ([facility] [address]     Diverticulitis     Heart palpitations 10/12/2017    Smoker     Type II or unspecified type diabetes mellitus without mention of complication, not stated as uncontrolled        Past Surgical History:        Procedure Laterality Date    COLON SURGERY      from diverticulitis    CORONARY ANGIOPLASTY WITH STENT PLACEMENT  2003    HERNIA REPAIR      HYSTERECTOMY      TONSILLECTOMY      TOTAL HIP ARTHROPLASTY         Social History:    Social History     Tobacco Use    Smoking status: Former Smoker     Packs/day: 0.50     Years: 30.00     Pack years: 15.00     Types: E-Cigarettes     Last attempt to quit: 12/29/2015     Years since quitting: 3.6    Smokeless tobacco: Never Used   Substance Use Topics    Alcohol use: Yes     Alcohol/week: 0.0 standard drinks     Comment: rare                                Counseling given: Not Answered      Vital Signs (Current): There were no vitals filed for this visit.                                            BP Readings from Last 3 Encounters:   08/01/19 130/80   05/21/19 92/64   04/25/19 118/60       NPO Status:                                                                                 BMI:   Wt Readings from Last 3 Encounters:   08/01/19 211 lb 9.6 oz (96 kg)   05/21/19 219 lb (99.3 kg)

## 2019-08-20 ENCOUNTER — HOSPITAL ENCOUNTER (OUTPATIENT)
Age: 64
Setting detail: OUTPATIENT SURGERY
Discharge: HOME | End: 2019-08-20
Attending: SPECIALIST | Admitting: SPECIALIST
Payer: MEDICARE

## 2019-08-20 ENCOUNTER — ANESTHESIA (OUTPATIENT)
Dept: ENDOSCOPY | Age: 64
End: 2019-08-20
Payer: MEDICARE

## 2019-08-20 VITALS
DIASTOLIC BLOOD PRESSURE: 59 MMHG | OXYGEN SATURATION: 94 % | RESPIRATION RATE: 12 BRPM | SYSTOLIC BLOOD PRESSURE: 117 MMHG

## 2019-08-20 VITALS
DIASTOLIC BLOOD PRESSURE: 78 MMHG | RESPIRATION RATE: 18 BRPM | HEIGHT: 63 IN | OXYGEN SATURATION: 94 % | WEIGHT: 211 LBS | HEART RATE: 94 BPM | BODY MASS INDEX: 37.39 KG/M2 | SYSTOLIC BLOOD PRESSURE: 120 MMHG | TEMPERATURE: 97.6 F

## 2019-08-20 PROCEDURE — 3609027000 HC COLONOSCOPY: Performed by: SPECIALIST

## 2019-08-20 PROCEDURE — 2580000003 HC RX 258: Performed by: SPECIALIST

## 2019-08-20 PROCEDURE — 2500000003 HC RX 250 WO HCPCS: Performed by: NURSE ANESTHETIST, CERTIFIED REGISTERED

## 2019-08-20 PROCEDURE — 3700000001 HC ADD 15 MINUTES (ANESTHESIA): Performed by: SPECIALIST

## 2019-08-20 PROCEDURE — 6360000002 HC RX W HCPCS: Performed by: NURSE ANESTHETIST, CERTIFIED REGISTERED

## 2019-08-20 PROCEDURE — 3700000000 HC ANESTHESIA ATTENDED CARE: Performed by: SPECIALIST

## 2019-08-20 PROCEDURE — 7100000010 HC PHASE II RECOVERY - FIRST 15 MIN: Performed by: SPECIALIST

## 2019-08-20 PROCEDURE — 2580000003 HC RX 258: Performed by: NURSE ANESTHETIST, CERTIFIED REGISTERED

## 2019-08-20 PROCEDURE — 45380 COLONOSCOPY AND BIOPSY: CPT | Performed by: SPECIALIST

## 2019-08-20 PROCEDURE — 88305 TISSUE EXAM BY PATHOLOGIST: CPT

## 2019-08-20 PROCEDURE — 45385 COLONOSCOPY W/LESION REMOVAL: CPT | Performed by: SPECIALIST

## 2019-08-20 RX ORDER — ONDANSETRON 2 MG/ML
4 INJECTION INTRAMUSCULAR; INTRAVENOUS
Status: DISCONTINUED | OUTPATIENT
Start: 2019-08-20 | End: 2019-08-20 | Stop reason: HOSPADM

## 2019-08-20 RX ORDER — LIDOCAINE HYDROCHLORIDE 10 MG/ML
INJECTION, SOLUTION INFILTRATION; PERINEURAL PRN
Status: DISCONTINUED | OUTPATIENT
Start: 2019-08-20 | End: 2019-08-20 | Stop reason: SDUPTHER

## 2019-08-20 RX ORDER — PROPOFOL 10 MG/ML
INJECTION, EMULSION INTRAVENOUS PRN
Status: DISCONTINUED | OUTPATIENT
Start: 2019-08-20 | End: 2019-08-20 | Stop reason: SDUPTHER

## 2019-08-20 RX ORDER — SODIUM CHLORIDE 0.9 % (FLUSH) 0.9 %
10 SYRINGE (ML) INJECTION PRN
Status: DISCONTINUED | OUTPATIENT
Start: 2019-08-20 | End: 2019-08-20 | Stop reason: HOSPADM

## 2019-08-20 RX ORDER — SODIUM CHLORIDE 0.9 % (FLUSH) 0.9 %
10 SYRINGE (ML) INJECTION EVERY 12 HOURS SCHEDULED
Status: DISCONTINUED | OUTPATIENT
Start: 2019-08-20 | End: 2019-08-20 | Stop reason: HOSPADM

## 2019-08-20 RX ORDER — LIDOCAINE HYDROCHLORIDE 10 MG/ML
1 INJECTION, SOLUTION EPIDURAL; INFILTRATION; INTRACAUDAL; PERINEURAL
Status: DISCONTINUED | OUTPATIENT
Start: 2019-08-20 | End: 2019-08-20 | Stop reason: HOSPADM

## 2019-08-20 RX ORDER — SODIUM CHLORIDE 9 MG/ML
INJECTION, SOLUTION INTRAVENOUS CONTINUOUS
Status: DISCONTINUED | OUTPATIENT
Start: 2019-08-20 | End: 2019-08-20 | Stop reason: HOSPADM

## 2019-08-20 RX ORDER — SODIUM CHLORIDE 9 MG/ML
INJECTION, SOLUTION INTRAVENOUS CONTINUOUS PRN
Status: DISCONTINUED | OUTPATIENT
Start: 2019-08-20 | End: 2019-08-20 | Stop reason: SDUPTHER

## 2019-08-20 RX ADMIN — PROPOFOL 30 MG: 10 INJECTION, EMULSION INTRAVENOUS at 07:15

## 2019-08-20 RX ADMIN — PROPOFOL 70 MG: 10 INJECTION, EMULSION INTRAVENOUS at 07:05

## 2019-08-20 RX ADMIN — SODIUM CHLORIDE: 9 INJECTION, SOLUTION INTRAVENOUS at 06:58

## 2019-08-20 RX ADMIN — LIDOCAINE HYDROCHLORIDE 30 MG: 10 INJECTION, SOLUTION INFILTRATION; PERINEURAL at 07:05

## 2019-08-20 RX ADMIN — PROPOFOL 30 MG: 10 INJECTION, EMULSION INTRAVENOUS at 07:12

## 2019-08-20 RX ADMIN — SODIUM CHLORIDE: 9 INJECTION, SOLUTION INTRAVENOUS at 06:56

## 2019-08-20 RX ADMIN — PROPOFOL 30 MG: 10 INJECTION, EMULSION INTRAVENOUS at 07:17

## 2019-08-20 RX ADMIN — PROPOFOL 30 MG: 10 INJECTION, EMULSION INTRAVENOUS at 07:08

## 2019-08-20 RX ADMIN — PROPOFOL 30 MG: 10 INJECTION, EMULSION INTRAVENOUS at 07:20

## 2019-08-20 NOTE — PROGRESS NOTES
instruccted to use oxygen today while sleeping and resting due to sedation. discharge instrutions given stated understanding

## 2019-08-20 NOTE — ANESTHESIA POSTPROCEDURE EVALUATION
Department of Anesthesiology  Postprocedure Note    Patient: Rhonda Valdivia  MRN: [id number]  YOB: 1955  Date of evaluation: 8/20/2019  Time:  7:31 AM     Procedure Summary     Date:  08/20/19 Room / Location:  [facility] OR 01 / [facility]    Anesthesia Start:  0700 Anesthesia Stop:      Procedure:  COLORECTAL CANCER SCREENING, NOT HIGH RISK (N/A ) Diagnosis:  (colon cancer screening Z12.11 (CPT [address] [place] [address]))    Surgeon:  Easton Bill MD Responsible Provider:  SUPA Bello CRNA    Anesthesia Type:  MAC ASA Status:  3          Anesthesia Type: MAC    Darius Phase I:      Darius Phase II:      Last vitals: Reviewed and per EMR flowsheets.        Anesthesia Post Evaluation    Patient location during evaluation: PACU  Patient participation: complete - patient participated  Level of consciousness: awake and alert  Pain score: 0  Airway patency: patent  Nausea & Vomiting: no nausea and no vomiting  Complications: no  Cardiovascular status: blood pressure returned to baseline and hemodynamically stable  Respiratory status: acceptable and face mask  Hydration status: euvolemic

## 2019-09-20 RX ORDER — ATORVASTATIN CALCIUM 10 MG/1
10 TABLET, FILM COATED ORAL DAILY
Qty: 90 TABLET | Refills: 1 | Status: SHIPPED | OUTPATIENT
Start: 2019-09-20 | End: 2020-03-09 | Stop reason: DRUGHIGH

## 2019-10-03 DIAGNOSIS — I10 ESSENTIAL HYPERTENSION: ICD-10-CM

## 2019-10-03 RX ORDER — LISINOPRIL 5 MG/1
TABLET ORAL
Qty: 90 TABLET | Refills: 1 | Status: SHIPPED | OUTPATIENT
Start: 2019-10-03 | End: 2019-10-28 | Stop reason: SDUPTHER

## 2019-10-22 DIAGNOSIS — Z12.31 ENCOUNTER FOR SCREENING MAMMOGRAM FOR MALIGNANT NEOPLASM OF BREAST: Primary | ICD-10-CM

## 2019-10-23 ENCOUNTER — CARE COORDINATION (OUTPATIENT)
Dept: CARE COORDINATION | Age: 64
End: 2019-10-23

## 2019-10-28 DIAGNOSIS — E11.9 TYPE 2 DIABETES MELLITUS WITHOUT COMPLICATION, WITHOUT LONG-TERM CURRENT USE OF INSULIN (HCC): ICD-10-CM

## 2019-10-28 DIAGNOSIS — I10 ESSENTIAL HYPERTENSION: ICD-10-CM

## 2019-10-28 RX ORDER — LISINOPRIL 5 MG/1
TABLET ORAL
Qty: 90 TABLET | Refills: 1 | Status: SHIPPED | OUTPATIENT
Start: 2019-10-28 | End: 2020-03-03 | Stop reason: SDUPTHER

## 2019-11-20 ENCOUNTER — CARE COORDINATION (OUTPATIENT)
Dept: CARE COORDINATION | Age: 64
End: 2019-11-20

## 2019-11-29 ENCOUNTER — CARE COORDINATION (OUTPATIENT)
Dept: CARE COORDINATION | Age: 64
End: 2019-11-29

## 2019-11-29 ASSESSMENT — ENCOUNTER SYMPTOMS: DYSPNEA ASSOCIATED WITH: MINIMAL EXERTION

## 2019-12-02 ENCOUNTER — NURSE ONLY (OUTPATIENT)
Dept: FAMILY MEDICINE CLINIC | Age: 64
End: 2019-12-02

## 2019-12-02 VITALS — OXYGEN SATURATION: 99 %

## 2019-12-02 DIAGNOSIS — R06.02 SOB (SHORTNESS OF BREATH): Primary | ICD-10-CM

## 2019-12-02 RX ORDER — CYCLOBENZAPRINE HCL 10 MG
TABLET ORAL
Refills: 0 | COMMUNITY
Start: 2019-11-22 | End: 2022-04-06 | Stop reason: SDUPTHER

## 2019-12-02 RX ORDER — CELECOXIB 200 MG/1
CAPSULE ORAL
Refills: 0 | COMMUNITY
Start: 2019-09-27 | End: 2022-04-06 | Stop reason: ALTCHOICE

## 2019-12-09 ENCOUNTER — TELEPHONE (OUTPATIENT)
Dept: PRIMARY CARE CLINIC | Age: 64
End: 2019-12-09

## 2019-12-09 ENCOUNTER — CARE COORDINATION (OUTPATIENT)
Dept: CARE COORDINATION | Age: 64
End: 2019-12-09

## 2019-12-09 SDOH — HEALTH STABILITY: MENTAL HEALTH: HOW OFTEN DO YOU HAVE A DRINK CONTAINING ALCOHOL?: MONTHLY OR LESS

## 2019-12-09 SDOH — ECONOMIC STABILITY: TRANSPORTATION INSECURITY
IN THE PAST 12 MONTHS, HAS THE LACK OF TRANSPORTATION KEPT YOU FROM MEDICAL APPOINTMENTS OR FROM GETTING MEDICATIONS?: NO

## 2019-12-09 SDOH — HEALTH STABILITY: PHYSICAL HEALTH: ON AVERAGE, HOW MANY DAYS PER WEEK DO YOU ENGAGE IN MODERATE TO STRENUOUS EXERCISE (LIKE A BRISK WALK)?: 3 DAYS

## 2019-12-09 SDOH — HEALTH STABILITY: MENTAL HEALTH
STRESS IS WHEN SOMEONE FEELS TENSE, NERVOUS, ANXIOUS, OR CAN'T SLEEP AT NIGHT BECAUSE THEIR MIND IS TROUBLED. HOW STRESSED ARE YOU?: ONLY A LITTLE

## 2019-12-09 SDOH — HEALTH STABILITY: MENTAL HEALTH: HOW MANY STANDARD DRINKS CONTAINING ALCOHOL DO YOU HAVE ON A TYPICAL DAY?: 1 OR 2

## 2019-12-09 SDOH — ECONOMIC STABILITY: FOOD INSECURITY: WITHIN THE PAST 12 MONTHS, THE FOOD YOU BOUGHT JUST DIDN'T LAST AND YOU DIDN'T HAVE MONEY TO GET MORE.: NEVER TRUE

## 2019-12-09 SDOH — SOCIAL STABILITY: SOCIAL NETWORK: ARE YOU MARRIED, WIDOWED, DIVORCED, SEPARATED, NEVER MARRIED, OR LIVING WITH A PARTNER?: DIVORCED

## 2019-12-09 SDOH — SOCIAL STABILITY: SOCIAL NETWORK: HOW OFTEN DO YOU ATTENT MEETINGS OF THE CLUB OR ORGANIZATION YOU BELONG TO?: NEVER

## 2019-12-09 SDOH — HEALTH STABILITY: PHYSICAL HEALTH: ON AVERAGE, HOW MANY MINUTES DO YOU ENGAGE IN EXERCISE AT THIS LEVEL?: 20 MIN

## 2019-12-09 SDOH — SOCIAL STABILITY: SOCIAL NETWORK: IN A TYPICAL WEEK, HOW MANY TIMES DO YOU TALK ON THE PHONE WITH FAMILY, FRIENDS, OR NEIGHBORS?: TWICE A WEEK

## 2019-12-09 SDOH — SOCIAL STABILITY: SOCIAL NETWORK: HOW OFTEN DO YOU ATTEND CHURCH OR RELIGIOUS SERVICES?: 1 TO 4 TIMES PER YEAR

## 2019-12-09 SDOH — ECONOMIC STABILITY: TRANSPORTATION INSECURITY
IN THE PAST 12 MONTHS, HAS LACK OF TRANSPORTATION KEPT YOU FROM MEETINGS, WORK, OR FROM GETTING THINGS NEEDED FOR DAILY LIVING?: NO

## 2019-12-09 SDOH — ECONOMIC STABILITY: FOOD INSECURITY: WITHIN THE PAST 12 MONTHS, YOU WORRIED THAT YOUR FOOD WOULD RUN OUT BEFORE YOU GOT MONEY TO BUY MORE.: NEVER TRUE

## 2019-12-09 SDOH — SOCIAL STABILITY: SOCIAL NETWORK
DO YOU BELONG TO ANY CLUBS OR ORGANIZATIONS SUCH AS CHURCH GROUPS UNIONS, FRATERNAL OR ATHLETIC GROUPS, OR SCHOOL GROUPS?: NO

## 2019-12-09 SDOH — ECONOMIC STABILITY: INCOME INSECURITY: HOW HARD IS IT FOR YOU TO PAY FOR THE VERY BASICS LIKE FOOD, HOUSING, MEDICAL CARE, AND HEATING?: HARD

## 2019-12-09 SDOH — SOCIAL STABILITY: SOCIAL NETWORK: HOW OFTEN DO YOU GET TOGETHER WITH FRIENDS OR RELATIVES?: TWICE A WEEK

## 2019-12-11 ENCOUNTER — CARE COORDINATION (OUTPATIENT)
Dept: CARE COORDINATION | Age: 64
End: 2019-12-11

## 2019-12-12 ENCOUNTER — CARE COORDINATION (OUTPATIENT)
Dept: CARE COORDINATION | Age: 64
End: 2019-12-12

## 2019-12-12 ENCOUNTER — TELEPHONE (OUTPATIENT)
Dept: PRIMARY CARE CLINIC | Age: 64
End: 2019-12-12

## 2019-12-12 DIAGNOSIS — E11.9 TYPE 2 DIABETES MELLITUS WITHOUT COMPLICATION, WITHOUT LONG-TERM CURRENT USE OF INSULIN (HCC): Primary | ICD-10-CM

## 2019-12-12 NOTE — TELEPHONE ENCOUNTER
Patient has been receiving breo samples from us. She reports her insurance will not cover and her cost is greater than 100 dollars. I had lsw review call payor and what is covered and what is out of pocket cost for patient. Per [employer] only covers dulera or symbicort. For $ 8.60    Please consider symbicort as it is very comparative to breo  As in covering asthma and copd. Aaron Denise is for primarily asthma.   All are combination inhaled corticosteroid and long-acting beta2-agonist     What ever you decide please call into local pharm for patient    I have supplied patient with 1 breo sample to tide over

## 2019-12-17 ENCOUNTER — TELEPHONE (OUTPATIENT)
Dept: ADMINISTRATIVE | Age: 64
End: 2019-12-17

## 2019-12-27 ENCOUNTER — CARE COORDINATION (OUTPATIENT)
Dept: CARE COORDINATION | Age: 64
End: 2019-12-27

## 2019-12-27 ASSESSMENT — ENCOUNTER SYMPTOMS: DYSPNEA ASSOCIATED WITH: EXERTION

## 2020-01-15 ENCOUNTER — CARE COORDINATION (OUTPATIENT)
Dept: CARE COORDINATION | Age: 65
End: 2020-01-15

## 2020-01-15 NOTE — CARE COORDINATION
ACM CALLED PATIENT LEFT DETAILED MESSAGE REGARDING NATURE OF CALL AND REQUESTED RETURN CALL. CONTACT INFORMATION SUPPLIED.   PATIENT NEEDS PCP APPOINTMENT, A1C  LAST AIC 2/18/2019 OF 8.0

## 2020-01-27 ENCOUNTER — CARE COORDINATION (OUTPATIENT)
Dept: CARE COORDINATION | Age: 65
End: 2020-01-27

## 2020-01-27 ASSESSMENT — ENCOUNTER SYMPTOMS: DYSPNEA ASSOCIATED WITH: EXERTION

## 2020-01-27 NOTE — CARE COORDINATION
on Diabetes and copd    I have instructed Ada Smith to complete a self tracking handout on Blood Sugars , Blood Pressures  and Weights and instructed them to bring it with them to her next appointment. Discussed use, benefit, and side effects of prescribed medications. Barriers to medication compliance addressed. All patient questions answered. Pt voiced understanding. Diabetes Assessment    Medic Alert ID:  No  Meal Planning:  Avoidance of concentrated sweets   How often do you test your blood sugar?:  Daily   Do you have barriers with adherence to non-pharmacologic self-management interventions?  (Nutrition/Exercise/Self-Monitoring):  Yes   Have you ever had to go to the ED for symptoms of low blood sugar?:  No       No patient-reported symptoms   Do you have hyperglycemia symptoms?:  No   Do you have hypoglycemia symptoms?:  No   Last Blood Sugar Value:  72   Blood Sugar Monitoring Regimen:  Before Meals   Blood Sugar Trends:  No Change       and   Lab Results   Component Value Date    LABA1C 8 02/18/2019    LABA1C 9.3 11/15/2017    LABA1C 9.1 (H) 09/11/2017     Lab Results   Component Value Date    LABMICR 5.50 (H) 09/25/2018    LDLCALC 187 (H) 11/15/2017    CREATININE 0.72 11/26/2019     COPD Assessment    Does the patient understand envrionmental exposure?:  Yes  Is the patient able to verbalize Rescue vs. Long Acting medications?:  Yes  Does the patient have a nebulizer?:  Yes  Does the patient use a space with inhaled medications?:  No     No patient-reported symptoms         Symptoms:      Symptom course:  stable  Breathlessness:  exertion  Increase use of rapid acting/rescue inhaled medications?:  No  Change in chronic cough?:  No/At Baseline  Change in sputum?:  No/At Baseline  Sputum characteristics:  Clear  Self Monitoring - SaO2:  No  Have you had a recent diagnosis of pneumonia either by PCP or at a hospital?:  No     patient reports currently in green copd zone     ZONE Definitions:  Enedina Balbuena

## 2020-02-28 ENCOUNTER — CARE COORDINATION (OUTPATIENT)
Dept: CARE COORDINATION | Age: 65
End: 2020-02-28

## 2020-02-28 SDOH — ECONOMIC STABILITY: TRANSPORTATION INSECURITY
IN THE PAST 12 MONTHS, HAS LACK OF TRANSPORTATION KEPT YOU FROM MEETINGS, WORK, OR FROM GETTING THINGS NEEDED FOR DAILY LIVING?: YES

## 2020-02-28 SDOH — ECONOMIC STABILITY: TRANSPORTATION INSECURITY
IN THE PAST 12 MONTHS, HAS THE LACK OF TRANSPORTATION KEPT YOU FROM MEDICAL APPOINTMENTS OR FROM GETTING MEDICATIONS?: YES

## 2020-02-28 ASSESSMENT — ENCOUNTER SYMPTOMS: DYSPNEA ASSOCIATED WITH: EXERTION

## 2020-02-28 NOTE — CARE COORDINATION
including grammar, punctuation and spelling as well as words and phrases that may seem inappropriate. If there are questions or concerns please feel free to contact me to clarify.

## 2020-03-03 ENCOUNTER — OFFICE VISIT (OUTPATIENT)
Dept: FAMILY MEDICINE CLINIC | Age: 65
End: 2020-03-03
Payer: MEDICARE

## 2020-03-03 VITALS
HEIGHT: 63 IN | WEIGHT: 214 LBS | HEART RATE: 92 BPM | DIASTOLIC BLOOD PRESSURE: 82 MMHG | SYSTOLIC BLOOD PRESSURE: 120 MMHG | TEMPERATURE: 98.4 F | BODY MASS INDEX: 37.92 KG/M2 | OXYGEN SATURATION: 94 %

## 2020-03-03 PROCEDURE — 1036F TOBACCO NON-USER: CPT | Performed by: INTERNAL MEDICINE

## 2020-03-03 PROCEDURE — 3046F HEMOGLOBIN A1C LEVEL >9.0%: CPT | Performed by: INTERNAL MEDICINE

## 2020-03-03 PROCEDURE — G8427 DOCREV CUR MEDS BY ELIG CLIN: HCPCS | Performed by: INTERNAL MEDICINE

## 2020-03-03 PROCEDURE — G8417 CALC BMI ABV UP PARAM F/U: HCPCS | Performed by: INTERNAL MEDICINE

## 2020-03-03 PROCEDURE — 99214 OFFICE O/P EST MOD 30 MIN: CPT | Performed by: INTERNAL MEDICINE

## 2020-03-03 PROCEDURE — G8484 FLU IMMUNIZE NO ADMIN: HCPCS | Performed by: INTERNAL MEDICINE

## 2020-03-03 PROCEDURE — 3017F COLORECTAL CA SCREEN DOC REV: CPT | Performed by: INTERNAL MEDICINE

## 2020-03-03 PROCEDURE — G8510 SCR DEP NEG, NO PLAN REQD: HCPCS | Performed by: INTERNAL MEDICINE

## 2020-03-03 PROCEDURE — 2022F DILAT RTA XM EVC RTNOPTHY: CPT | Performed by: INTERNAL MEDICINE

## 2020-03-03 RX ORDER — LISINOPRIL 5 MG/1
TABLET ORAL
Qty: 90 TABLET | Refills: 3 | Status: SHIPPED | OUTPATIENT
Start: 2020-03-03 | End: 2021-03-14 | Stop reason: SDUPTHER

## 2020-03-03 ASSESSMENT — PATIENT HEALTH QUESTIONNAIRE - PHQ9
SUM OF ALL RESPONSES TO PHQ QUESTIONS 1-9: 0
1. LITTLE INTEREST OR PLEASURE IN DOING THINGS: 0
2. FEELING DOWN, DEPRESSED OR HOPELESS: 0
SUM OF ALL RESPONSES TO PHQ9 QUESTIONS 1 & 2: 0
SUM OF ALL RESPONSES TO PHQ QUESTIONS 1-9: 0

## 2020-03-03 NOTE — PROGRESS NOTES
Subjective:      Patient ID: Giselle Pierce is a 63 y.o. female  New to provider   HPI  Pt is new to provider. Previously with Dr. Vj Chavira. Hx T2DM, diverticulitits, CAD, tobacco abuse. Meds: Celebrex, metformin, lisinopril, (cannot afford Lipitor), Percocet, gabapentin, flexeril, glimepride. Last colonoscopy: 2019. 6 polyps   Last pap: at least 8 yrs ago  Result: negative. Last mammogram: 2017, benign. DEXA: never   ASCVD score: 12.5  Last A1c 9.3 in 11/2017--->8 in 2/2019    CC: left knee pain x 3 months  Pain is sharp, in front of the left knee worse with ambulation, relieved with rest. Assoc swelling, stiffness, buckling. No locking, no antecedent trauma. No relief from Celebrex prescribed by pain mx.     Past Medical History:   Diagnosis Date    Arthritis     CAD (coronary artery disease)     2 stent 2008 [facility]    COPD (chronic obstructive pulmonary disease) ([facility] [address]     Diverticulitis     Heart palpitations 10/12/2017    Smoker     Type II or unspecified type diabetes mellitus without mention of complication, not stated as uncontrolled      Past Surgical History:   Procedure Laterality Date    COLON SURGERY      from diverticulitis    COLONOSCOPY N/A 8/20/2019    COLORECTAL CANCER SCREENING, NOT HIGH RISK performed by Jose Luis Figueroa MD at [address] [place] [address], [place] [address] WITH STENT PLACEMENT  2003    HERNIA REPAIR      HYSTERECTOMY      TONSILLECTOMY      TOTAL HIP ARTHROPLASTY       Social History     Socioeconomic History    Marital status:      Spouse name: Not on file    Number of children: Not on file    Years of education: Not on file    Highest education level: Not on file   Occupational History    Not on file   Social Needs    Financial resource strain: Hard    Food insecurity:     Worry: Never true     Inability: Never true    Transportation needs:     Medical: Yes     Non-medical: Yes   Tobacco Use    Smoking status: Former Smoker     Packs/day:

## 2020-03-04 ASSESSMENT — ENCOUNTER SYMPTOMS
COUGH: 0
DIARRHEA: 0
NAUSEA: 0
ABDOMINAL PAIN: 0
WHEEZING: 0
SHORTNESS OF BREATH: 0
VOMITING: 0

## 2020-03-06 ENCOUNTER — HOSPITAL ENCOUNTER (OUTPATIENT)
Dept: WOMENS IMAGING | Age: 65
Discharge: HOME | End: 2020-03-08
Payer: MEDICARE

## 2020-03-06 ENCOUNTER — HOSPITAL ENCOUNTER (OUTPATIENT)
Dept: LAB | Age: 65
Discharge: HOME | End: 2020-03-06
Payer: MEDICARE

## 2020-03-06 ENCOUNTER — HOSPITAL ENCOUNTER (OUTPATIENT)
Dept: GENERAL RADIOLOGY | Age: 65
Discharge: HOME | End: 2020-03-08
Payer: MEDICARE

## 2020-03-06 LAB
CHOLESTEROL, TOTAL: 301 MG/DL (ref 0–199)
CREATININE URINE: 172.8 MG/DL
HBA1C MFR BLD: 9.9 % (ref 4.8–5.9)
HDLC SERPL-MCNC: 46 MG/DL (ref 40–59)
LDL CHOLESTEROL CALCULATED: 219 MG/DL (ref 0–129)
MICROALBUMIN UR-MCNC: 3.1 MG/DL
MICROALBUMIN/CREAT UR-RTO: 17.9 MG/G (ref 0–30)
TRIGL SERPL-MCNC: 178 MG/DL (ref 0–150)

## 2020-03-06 PROCEDURE — 83036 HEMOGLOBIN GLYCOSYLATED A1C: CPT

## 2020-03-06 PROCEDURE — 77063 BREAST TOMOSYNTHESIS BI: CPT

## 2020-03-06 PROCEDURE — 73562 X-RAY EXAM OF KNEE 3: CPT

## 2020-03-06 PROCEDURE — 82043 UR ALBUMIN QUANTITATIVE: CPT

## 2020-03-06 PROCEDURE — 82570 ASSAY OF URINE CREATININE: CPT

## 2020-03-06 PROCEDURE — 80061 LIPID PANEL: CPT

## 2020-03-09 ENCOUNTER — TELEPHONE (OUTPATIENT)
Dept: FAMILY MEDICINE CLINIC | Age: 65
End: 2020-03-09

## 2020-03-09 ENCOUNTER — CARE COORDINATION (OUTPATIENT)
Dept: CARE COORDINATION | Age: 65
End: 2020-03-09

## 2020-03-09 RX ORDER — ATORVASTATIN CALCIUM 40 MG/1
40 TABLET, FILM COATED ORAL DAILY
Qty: 30 TABLET | Refills: 3 | Status: SHIPPED | OUTPATIENT
Start: 2020-03-09 | End: 2020-03-10

## 2020-03-09 RX ORDER — INSULIN GLARGINE 100 [IU]/ML
24 INJECTION, SOLUTION SUBCUTANEOUS NIGHTLY
Qty: 5 PEN | Refills: 3 | Status: SHIPPED | OUTPATIENT
Start: 2020-03-09 | End: 2020-03-12

## 2020-03-09 NOTE — CARE COORDINATION
Ambulatory Care Coordination Note  3/9/2020  CM Risk Score: 3  Shelby Mortality Risk Score: [unfilled]    ACC: Marlena Johnson RN    Summary Note: [facility] CALLED PATIENT SHE IS CURRENTLY AT A STORE. REVIEWED RECENT PCP ENCOUNTER. PATIENT REPORTS Friday 3/6/2020 FOLLOWING LAB DRAW BOTH HER ARMS SWELLED AND BECAME PAINFUL. SHE REPORTS SHE WENT TO [facility]  ED. SHE REPORTS US WAS DONE SHOWING NO DVT. PATIENT REPORTS SWELLING IS GOING UP AND DOWN SOMEWHAT WITH REDNESS AND SOME DISCOMFORT. SHE REPORTS SHE IS APPLYING COMPRESSES WHICH IS HELPFUL. PATIENT DENIED CP,SOB. PATIENT HAD TO END CALL AS SHE IS SHOPPING. SHE REPORTS SHE WOULD CALL [facility] BACK. [facility] WAS NOT ABLE TO COMPLETE ENCOUNTER AT THIS TIME         Care Coordination Interventions    Program Enrollment:  Rising Risk  Referral from Primary Care Provider:  No  Suggested Interventions and Community Resources  [facility]:  Declined  Fall Risk Prevention:  Completed  Medication Assistance Program:  Completed (Comment: [address] )  Palliative Care:  Declined  Pharmacist:  Completed (Comment: sent referral 11/22/2019)  Pulmonary Rehab:  Declined  Other Services:  Completed (Comment: walk in t/l)  Zone Management Tools:  Completed (Comment: dm and copd zones )  Other Services or Interventions:  reviewed flu cautions and care          Goals Addressed                 This Visit's Progress     Self Monitoring   Improving     Self-Monitored Blood Glucose - I will check my blood sugar Fasting blood sugar and Other: AND BEFORE MEALS. LOG ALL AND PROVIDE AT APPOINTMENTS    None Recently Recorded    Barriers: lack of motivation  Plan for overcoming my barriers: ACC MONITORING   Confidence: 6/10  Anticipated Goal Completion Date: 5/1/2020              Prior to Admission medications    Medication Sig Start Date End Date Taking?  Authorizing Provider   lisinopril (PRINIVIL;ZESTRIL) 5 MG tablet TAKE 1 TABLET BY MOUTH DAILY 3/3/20   Domitila Domingo MD   metFORMIN

## 2020-03-12 ENCOUNTER — TELEPHONE (OUTPATIENT)
Dept: FAMILY MEDICINE CLINIC | Age: 65
End: 2020-03-12

## 2020-03-12 ENCOUNTER — CARE COORDINATION (OUTPATIENT)
Dept: CARE COORDINATION | Age: 65
End: 2020-03-12

## 2020-03-12 RX ORDER — INSULIN GLARGINE 100 [IU]/ML
15 INJECTION, SOLUTION SUBCUTANEOUS NIGHTLY
Qty: 5 PEN | Refills: 3 | Status: SHIPPED | OUTPATIENT
Start: 2020-03-12 | End: 2021-04-12

## 2020-03-12 RX ORDER — ATORVASTATIN CALCIUM 40 MG/1
40 TABLET, FILM COATED ORAL DAILY
Qty: 30 TABLET | Refills: 3 | Status: SHIPPED | OUTPATIENT
Start: 2020-03-12 | End: 2020-08-03

## 2020-03-12 NOTE — PROGRESS NOTES
Is this patient to be taking Lipitor? If so per niez they have no order. Patient advised ACM she was told she was to be on chol med by office.  I see it was ordered then cancelled please clarify and follow up with patient any additional actions needed   Thanks

## 2020-03-12 NOTE — CARE COORDINATION
Ambulatory Care Coordination Note  3/12/2020  CM Risk Score: 3  S[place]by Mortality Risk Score: [unfilled]    ACC: Adam Ye RN    Summary Note: AC SPOKE TO PATIENT. SHE REPORTS SHE HAS RECEIVED LANTUS PEN AND WILL COME UP TO OFFICE TO BE SHOWN HOW TO USE. PATIENT REPORTS SHE WAS TOLD SHE NEEDS CHOL MED BUT NOTHING HAS BEEN CALLED INTO PHARM. AC SENT PCP TELEPHONE ENCOUNTER TO CLARIFY. PATIENT REPORTS HER FASTING BS IS 70-90 MOST MORNINGS. AC HAS CONCERN OVER LANTUS AND SENT PCP MESSAGE REGARDING PATIENTS STARTING DOSE.  pcp changed lantus to 15 units. . patient to contact ac Monday with fasting bs results. pcp also called in lipitor 40 mg to pharm. Reviewed with the patient: current clinical status, medications, activities and diet. Side effects, adverse effects of the medication prescribed by provider as well as treatment plan/ rationale and result expectations have been discussed with the patient who expresses understanding. Close follow up to evaluate treatment results and for coordination of care. I have reviewed the patient's medical history in detail and updated the computerized patient record with any additional information provided by patient. Diabetes Assessment    Medic Alert ID:  No  Meal Planning:  Avoidance of concentrated sweets   How often do you test your blood sugar?:  Daily   Do you have barriers with adherence to non-pharmacologic self-management interventions?  (Nutrition/Exercise/Self-Monitoring):  Yes   Have you ever had to go to the ED for symptoms of low blood sugar?:  No       No patient-reported symptoms   Do you have hyperglycemia symptoms?:  No   Do you have hypoglycemia symptoms?:  No   Last Blood Sugar Value:  83   Blood Sugar Monitoring Regimen:  Morning Fasting       and   Lab Results   Component Value Date    LABA1C 9.9 (H) 03/06/2020    LABA1C 8 02/18/2019    LABA1C 9.3 11/15/2017     Lab Results   Component Value Date    LABMICR 3.10 (H) 03/06/2020    LDLCALC 219 (H) 03/06/2020    CREATININE 0.72 11/26/2019     COPD Assessment    Does the patient understand envrionmental exposure?:  Yes  Is the patient able to verbalize Rescue vs. Long Acting medications?:  Yes  Does the patient have a nebulizer?:  Yes  Does the patient use a space with inhaled medications?:  No     No patient-reported symptoms         Symptoms:      Have you had a recent diagnosis of pneumonia either by PCP or at a hospital?:  No       PATIENT REPORTS CURRENTLY IN GREEN COPD ZONE   ZONE Definitions:  Green Zone: All Clear, Breathing normal for patient, sleep normal,baseline cough/mucus, thinking clear. Yellow Zone:  Caution, More breathless with activity, increased cough/mucus, decreased energy, poor appetite, you feel like you have chest cold, increased weight in past week. Red Zone: Medical Alert, SevereShortness of breath  Even at rest, fever, chills, confused/drowsy, chest pain, coughing up blood        Care Coordination Interventions    Program Enrollment:  Rising Risk  Referral from Primary Care Provider:  No  Suggested Interventions and Community Resources  [address] [facility] [address]:  Declined  Fall Risk Prevention:  Completed  Medication Assistance Program:  Completed (Comment: [facility] )  Palliative Care:  Declined  Pharmacist:  Completed (Comment: sent referral 11/22/2019)  Pulmonary Rehab:  Declined  Other Services:  Completed (Comment: walk in t/l)  Zone Management Tools:  Completed (Comment: dm and copd zones )  Other Services or Interventions:  reviewed flu cautions and care          Goals Addressed                 This Visit's Progress     Self Monitoring   Improving     Self-Monitored Blood Glucose - I will check my blood sugar Fasting blood sugar and Other: AND BEFORE MEALS.  LOG ALL AND PROVIDE AT APPOINTMENTS    None Recently Recorded    Barriers: lack of motivation  Plan for overcoming my barriers: ACC MONITORING   Confidence: 6/10  Anticipated Goal Completion Date: 5/1/2020              Prior to Admission medications    Medication Sig Start Date End Date Taking? Authorizing Provider   insulin glargine (LANTUS SOLOSTAR) 100 UNIT/ML injection pen Inject 24 Units into the skin nightly 3/9/20   Bozena Aguayo MD   Insulin Pen Needle 33G X 4 MM MISC Use fasting, 30 minutes after lunch, 30 minutes after dinner 3/9/20   Bozena Aguayo MD   lisinopril (PRINIVIL;ZESTRIL) 5 MG tablet TAKE 1 TABLET BY MOUTH DAILY 3/3/20   Bozena Aguayo MD   metFORMIN (GLUCOPHAGE) 1000 MG tablet TAKE 1 TABLET BY MOUTH twice a day 3/3/20   Bozena Aguayo MD   umeclidinium-vilanterol [facility] ELLIPTA) 62.5-25 MCG/INH AEPB inhaler Inhale 1 puff into the lungs daily 3/3/20   Bozena Aguayo MD   diclofenac sodium 1 % GEL Apply 4 g topically 4 times daily 3/3/20   Bozena Aguayo MD   celecoxib (CELEBREX) 200 MG capsule TAKE 1 CAPSULE TWICE DAILY WITH FOOD AS NEEDED FOR PAIN stop ibuprofen 9/27/19   Historical Provider, MD   cyclobenzaprine (FLEXERIL) 10 MG tablet TAKE 1 TABLET BY MOUTH TWICE DAILY AS NEEDED, take ONE-HALF TABLET if one is too sedating. 11/22/19   Historical Provider, MD   oxyCODONE-acetaminophen (PERCOCET) 7.5-325 MG per tablet TAKE 1/2 (ONE-HALF) OF A TABLET BY MOUTH THREE TIMES DAILY AS DIRECTED for 20 days 4/16/19   Historical Provider, MD   gabapentin (NEURONTIN) 400 MG capsule Take 400 mg by mouth 2 times daily.   4/5/19   Historical Provider, MD   Handicap Placard [address] [facility] by Does not apply route 2/18/19   SUPA Self - CNP   ONE TOUCH LANCETS MISC 1 each by Does not apply route daily 10/3/18   [facility], MD   blood glucose test strips (ONE TOUCH ULTRA TEST) strip 1 each by Subdermal route daily  Patient not taking: Reported on 3/3/2020 9/25/18   [facility], MD   ipratropium-albuterol (DUONEB) 0.5-2.5 (3) MG/3ML SOLN nebulizer solution Take 3 mLs by nebulization every 6 hours as needed for Shortness of Breath 9/25/18   [facility], MD   fluticasone-vilanterol (BREO ELLIPTA) 100-25 MCG/INH AEPB inhaler Inhale 1 puff into the lungs daily 4/12/18   Jaime Lee MD   OXYGEN Inhale 2 L into the lungs daily.  [facility]    Historical Provider, MD       Future Appointments   Date Time Provider Kendy Stevensi   4/8/2020  8:45 AM Domitila Domingo MD General acute hospital

## 2020-03-16 ENCOUNTER — CARE COORDINATION (OUTPATIENT)
Dept: CARE COORDINATION | Age: 65
End: 2020-03-16

## 2020-03-24 ENCOUNTER — TELEPHONE (OUTPATIENT)
Dept: FAMILY MEDICINE CLINIC | Age: 65
End: 2020-03-24

## 2020-04-08 ENCOUNTER — VIRTUAL VISIT (OUTPATIENT)
Dept: FAMILY MEDICINE CLINIC | Age: 65
End: 2020-04-08
Payer: MEDICARE

## 2020-04-08 ENCOUNTER — CARE COORDINATION (OUTPATIENT)
Dept: CARE COORDINATION | Age: 65
End: 2020-04-08

## 2020-04-08 PROCEDURE — G8427 DOCREV CUR MEDS BY ELIG CLIN: HCPCS | Performed by: INTERNAL MEDICINE

## 2020-04-08 PROCEDURE — 3017F COLORECTAL CA SCREEN DOC REV: CPT | Performed by: INTERNAL MEDICINE

## 2020-04-08 PROCEDURE — 99214 OFFICE O/P EST MOD 30 MIN: CPT | Performed by: INTERNAL MEDICINE

## 2020-04-08 PROCEDURE — 2022F DILAT RTA XM EVC RTNOPTHY: CPT | Performed by: INTERNAL MEDICINE

## 2020-04-08 PROCEDURE — 3046F HEMOGLOBIN A1C LEVEL >9.0%: CPT | Performed by: INTERNAL MEDICINE

## 2020-04-08 ASSESSMENT — ENCOUNTER SYMPTOMS
NAUSEA: 0
ABDOMINAL PAIN: 0
COUGH: 0
VOMITING: 0
DIARRHEA: 0
WHEEZING: 0
SHORTNESS OF BREATH: 0

## 2020-04-08 NOTE — CARE COORDINATION
Ambulatory Care Coordination Note  2020  CM Risk Score: 3  [place] Mortality Risk Score: [unfilled]    ACC: Monica Smith, RN    Summary Note: ACM SPOKE TO PATIENT. SHE REPORTS DOING VERY WELL. SHE STATES SHE HAD VIDEO APPT TODAY WITH PCP AND IT WENT WELL. PATIENT WAS SO EXCITED TO SAY HER BS MONITORING IS GOING SO WELL. AVG BS NUMBERS 105-120. SHE REPORTS SHE FEELS SO MUCH BETTER OVER ALL. ACM REVIEWED COVID RISKS AND PROVIDED EDU   Patient contacted regarding recent discharge and COVID-19 risk   Care Transition Nurse/ Ambulatory Care Manager contacted the patient by telephone to perform post discharge assessment. Verified name and  with patient as identifiers. Patient has following risk factors of: DM AND OTHER CHRONIC CONDITIONS  and COPD. CTN/ACM reviewed discharge instructions, medical action plan and red flags related to discharge diagnosis. Reviewed and educated them on any new and changed medications related to discharge diagnosis. Advised obtaining a 90-day supply of all daily and as-needed medications. Education provided regarding infection prevention, and signs and symptoms of COVID-19 and when to seek medical attention with patient who verbalized understanding. Discussed exposure protocols and quarantine from [address] [place] [address] Hwy you at higher risk for severe illness  and given an opportunity for questions and concerns. The patient agrees to contact the COVID-19 hotline [phone] or PCP office for questions related to their healthcare. CTN/ACM provided contact information for future reference. FLU CLINIC INFORMATION     From CDC: Are you at higher risk for severe illness?  Wash your hands often.  Avoid close contact (6 feet, which is about two arm lengths) with people who are sick.  Put distance between yourself and other people if COVID-19 is spreading in your community.  Clean and disinfect frequently touched surfaces.    Avoid all cruise travel and non-essential air Declined  Fall Risk Prevention:  Completed  Medication Assistance Program:  Completed (Comment: ACTIVE WITH EXTRA HELP PROGRAM )  Palliative Care:  Declined  Pharmacist:  Completed (Comment: sent referral 11/22/2019)  Pulmonary Rehab:  Declined  Other Services:  Completed (Comment: walk in t/l)  Zone Management Tools:  Completed (Comment: dm and copd zones )  Other Services or Interventions:  reviewed flu cautions and care          Goals Addressed                 This Visit's Progress     Self Monitoring   Improving     Self-Monitored Blood Glucose - I will check my blood sugar Fasting blood sugar and Other: AND BEFORE MEALS. LOG ALL AND PROVIDE AT APPOINTMENTS    None Recently Recorded    Barriers: lack of motivation  Plan for overcoming my barriers: ACC MONITORING   Confidence: 6/10  Anticipated Goal Completion Date: 5/1/2020              Prior to Admission medications    Medication Sig Start Date End Date Taking?  Authorizing Provider   atorvastatin (LIPITOR) 40 MG tablet Take 1 tablet by mouth daily 3/12/20   Ashley Yarbrough MD   insulin glargine (LANTUS SOLOSTAR) 100 UNIT/ML injection pen Inject 15 Units into the skin nightly 3/12/20   Ashley Yarbrough MD   Insulin Pen Needle 33G X 4 MM MISC Use fasting, 30 minutes after lunch, 30 minutes after dinner 3/9/20   Ashley Yarbrough MD   lisinopril (PRINIVIL;ZESTRIL) 5 MG tablet TAKE 1 TABLET BY MOUTH DAILY 3/3/20   Ashley Yarbrough MD   metFORMIN (GLUCOPHAGE) 1000 MG tablet TAKE 1 TABLET BY MOUTH twice a day 3/3/20   Ashley Yarbrough MD   umeclidinium-vilanterol [facility] ELLIPTA) 62.5-25 MCG/INH AEPB inhaler Inhale 1 puff into the lungs daily 3/3/20   Ashley Yarbrough MD   diclofenac sodium 1 % GEL Apply 4 g topically 4 times daily 3/3/20   Ashley Yarbrough MD   celecoxib (CELEBREX) 200 MG capsule TAKE 1 CAPSULE TWICE DAILY WITH FOOD AS NEEDED FOR PAIN stop ibuprofen 9/27/19   Historical Provider, MD   cyclobenzaprine (FLEXERIL) 10 MG tablet TAKE 1 TABLET BY MOUTH TWICE DAILY AS NEEDED, take ONE-HALF TABLET

## 2020-04-08 NOTE — PROGRESS NOTES
2020    TELEHEALTH EVALUATION -- Audio/Visual (During RGNEA-01 public health emergency)    HPI:  Krista Gallardo (:  1955) has requested an audio/video evaluation for the following concern(s):    Pt presents for follow up for T2DM. Home BG readings controlled 105-110 on Lantus and metformin. Other meds: lisinopril and Lipitor. No podiatry or ophthalmology appt yet, no nephropathy per urine. Requests letter recommending emotional support animal. Mld anxiety about COVID-19. No depression or suicidal ideations. Review of Systems   Constitutional: Negative for chills, diaphoresis, fatigue and fever. HENT: Negative for congestion, ear discharge and ear pain. Respiratory: Negative for cough, shortness of breath and wheezing. Cardiovascular: Negative for chest pain. Gastrointestinal: Negative for abdominal pain, diarrhea, nausea and vomiting. Endocrine: Negative for cold intolerance and heat intolerance. Genitourinary: Negative for dysuria and frequency. Neurological: Negative for dizziness and light-headedness. Prior to Visit Medications    Medication Sig Taking?  Authorizing Provider   atorvastatin (LIPITOR) 40 MG tablet Take 1 tablet by mouth daily  Ada Peacock MD   insulin glargine (LANTUS SOLOSTAR) 100 UNIT/ML injection pen Inject 15 Units into the skin nightly  Ada Peacock MD   Insulin Pen Needle 33G X 4 MM MISC Use fasting, 30 minutes after lunch, 30 minutes after dinner  Ada Peacock MD   lisinopril (PRINIVIL;ZESTRIL) 5 MG tablet TAKE 1 TABLET BY MOUTH DAILY  Ada Peacock MD   metFORMIN (GLUCOPHAGE) 1000 MG tablet TAKE 1 TABLET BY MOUTH twice a day  Ada Peacock MD   umeclidinium-vilanterol (ANORO ELLIPTA) 62.5-25 MCG/INH AEPB inhaler Inhale 1 puff into the lungs daily  Ada Peacock MD   diclofenac sodium 1 % GEL Apply 4 g topically 4 times daily  Ada Peacock MD   celecoxib (CELEBREX) 200 MG capsule TAKE 1 CAPSULE TWICE DAILY WITH FOOD AS NEEDED FOR PAIN stop ibuprofen  Historical Provider, MD   cyclobenzaprine (FLEXERIL) 10 MG tablet TAKE 1 TABLET BY MOUTH TWICE DAILY AS NEEDED, take ONE-HALF TABLET if one is too sedating. Historical Provider, MD   oxyCODONE-acetaminophen (PERCOCET) 7.5-325 MG per tablet TAKE 1/2 (ONE-HALF) OF A TABLET BY MOUTH THREE TIMES DAILY AS DIRECTED for 20 days  Historical Provider, MD   gabapentin (NEURONTIN) 400 MG capsule Take 400 mg by mouth 2 times daily. Historical Provider, MD   Handicap Placard MISC by Does not apply route  Leslie Maria, APRN - CNP   ONE TOUCH LANCETS MISC 1 each by Does not apply route daily  Rajani Garcia MD   blood glucose test strips (ONE TOUCH ULTRA TEST) strip 1 each by Subdermal route daily  Patient not taking: Reported on 3/3/2020  Rajani Garcia MD   ipratropium-albuterol (DUONEB) 0.5-2.5 (3) MG/3ML SOLN nebulizer solution Take 3 mLs by nebulization every 6 hours as needed for Shortness of Breath  Rajani Garcia MD   fluticasone-vilanterol (BREO ELLIPTA) 100-25 MCG/INH AEPB inhaler Inhale 1 puff into the lungs daily  Rajani Garcia MD   OXYGEN Inhale 2 L into the lungs daily. [facility]  Historical Provider, MD       Social History     Tobacco Use    Smoking status: Former Smoker     Packs/day: 0.50     Years: 30.00     Pack years: 15.00     Types: E-Cigarettes     Last attempt to quit: 2015     Years since quittin.2    Smokeless tobacco: Never Used   Substance Use Topics    Alcohol use:  Yes     Alcohol/week: 0.0 standard drinks     Frequency: Monthly or less     Drinks per session: 1 or 2     Binge frequency: Never     Comment: rare    Drug use: No      Allergies   Allergen Reactions    Codeine Hives    Invokana [Canagliflozin]      Yeast infection     Penicillins Hives    Strawberry Extract Hives    Vicodin [Hydrocodone-Acetaminophen] Hives   ,   Past Medical History:   Diagnosis Date    Arthritis     CAD (coronary artery disease)     2 stent  [facility]    [facility]     Diverticulitis     Heart palpitations 10/12/2017    Smoker     Type II or unspecified type diabetes mellitus without mention of complication, not stated as uncontrolled    ,   Past Surgical History:   Procedure Laterality Date    COLON SURGERY      from diverticulitis    COLONOSCOPY N/A 2019    COLORECTAL CANCER SCREENING, NOT HIGH RISK performed by Maegan Cho MD at [address] [place] [address]  2003    HERNIA REPAIR      HYSTERECTOMY      TONSILLECTOMY      TOTAL HIP ARTHROPLASTY     ,   Social History     Tobacco Use    Smoking status: Former Smoker     Packs/day: 0.50     Years: 30.00     Pack years: 15.00     Types: E-Cigarettes     Last attempt to quit: 2015     Years since quittin.2    Smokeless tobacco: Never Used   Substance Use Topics    Alcohol use: Yes     Alcohol/week: 0.0 standard drinks     Frequency: Monthly or less     Drinks per session: 1 or 2     Binge frequency: Never     Comment: rare    Drug use: No   PHYSICAL EXAMINATION:  [ INSTRUCTIONS:  \"[x]\" Indicates a positive item  \"[]\" Indicates a negative item  -- DELETE ALL ITEMS NOT EXAMINED]  Vital Signs: (As obtained by patient/caregiver or practitioner observation)    Blood pressure-  Heart rate-    Respiratory rate-    Temperature-  Pulse oximetry-     Constitutional: [] Appears well-developed and well-nourished [] No apparent distress      [] Abnormal-   Mental status  [x] Alert and awake  [x] Oriented to person/place/time [x]Able to follow commands      Eyes:  EOM    []  Normal  [] Abnormal-  Sclera  []  Normal  [] Abnormal -         Discharge []  None visible  [] Abnormal -    HENT:   [] Normocephalic, atraumatic.   [] Abnormal   [] Mouth/Throat: Mucous membranes are moist.     External Ears [] Normal  [] Abnormal-     Neck: [] No visualized mass     Pulmonary/Chest: [x] Respiratory effort normal.  [x] No visualized signs of difficulty breathing or respiratory

## 2020-05-14 ENCOUNTER — CARE COORDINATION (OUTPATIENT)
Dept: CARE COORDINATION | Age: 65
End: 2020-05-14

## 2020-05-14 NOTE — CARE COORDINATION
Ambulatory Care Coordination Note  2020  CM Risk Score: 3  [place] Mortality Risk Score: [unfilled]    ACC: Crystal Conde, RN    Summary Note: ACM SPOKE TO PATIENT SHE REPORTS DOING VERY. HER BS IS VERY GOOD. [address] SHE REPORTS,  EATING WELL NO MEAT AND AIR FRYING ITEMS. WEIGHT IS DOWN A LITTLE AND SHE OVER ALL FEELS SO MUCH BETTER. SHE IS AWARE SHE IS TO GET A1C NEXT MONTH AND WILL FU WITH PCP IN July. ACM REVIEWED MEDICATIONS AND PATIENT REPORTS COMPLIANT  PATIENT IS RECEIVING FOOD SUPPLEMENTS FROM [employer] OF [address] [place] [address]. THEY DELIVER EVERY OTHER WEEK. ACM REVIEWED CC GOALS AND PROGRESSION  PATIENT REPORTS HER BROTHER WAS POSITIVE FOR COVID 3 WEEKS AGO AND SHE WAS IN LAST CONTACT 2 WEEKS PRIOR TO HIS CASE. SHE REPORTS HE IS WELL NOW AND SHE NEVER HAD S/S. SHE HAS NOT HAD CONTACT WITH BROTHER IN ALMOST 6 WEEKS. ACM REVIEWED COVID RISKS AND EDU  ACM WILL SIGN OFF AT THIS TIME AS PATIENT HAS NO CURRENT CC NEEDS AND DOING WELL. Patient contacted regarding recent discharge and COVID-19 risk   Care Transition Nurse/ Ambulatory Care Manager contacted the patient by telephone to perform post discharge assessment. Verified name and  with patient as identifiers. Patient has following risk factors of: COPD and diabetes. CTN/ACM reviewed discharge instructions, medical action plan and red flags related to discharge diagnosis. Reviewed and educated them on any new and changed medications related to discharge diagnosis. Advised obtaining a 90-day supply of all daily and as-needed medications. Education provided regarding infection prevention, and signs and symptoms of COVID-19 and when to seek medical attention with patient who verbalized understanding. Discussed exposure protocols and quarantine from [address] [place] [address] you at higher risk for severe illness  and given an opportunity for questions and concerns.  The patient agrees to contact the [address] [place] [address] hotline [phone] or PCP 11/22/2019)  Pulmonary Rehab:  Declined  Other Services:  Completed (Comment: walk in t/l)  Zone Management Tools:  Completed (Comment: dm and copd zones )  Other Services or Interventions:  reviewed flu cautions and care  covid-19 edu          Goals Addressed                 This Visit's Progress     COMPLETED: Self Monitoring   Improving     Self-Monitored Blood Glucose - I will check my blood sugar Fasting blood sugar and Other: AND BEFORE MEALS. LOG ALL AND PROVIDE AT APPOINTMENTS    None Recently Recorded    Barriers: lack of motivation  Plan for overcoming my barriers: ACC MONITORING   Confidence: 6/10  Anticipated Goal Completion Date: 5/1/2020              Prior to Admission medications    Medication Sig Start Date End Date Taking? Authorizing Provider   atorvastatin (LIPITOR) 40 MG tablet Take 1 tablet by mouth daily 3/12/20  Yes Yi Judge MD   insulin glargine (LANTUS SOLOSTAR) 100 UNIT/ML injection pen Inject 15 Units into the skin nightly 3/12/20  Yes Yi Judge MD   Insulin Pen Needle 33G X 4 MM MISC Use fasting, 30 minutes after lunch, 30 minutes after dinner 3/9/20  Yes Yi Judge MD   lisinopril (PRINIVIL;ZESTRIL) 5 MG tablet TAKE 1 TABLET BY MOUTH DAILY 3/3/20  Yes Yi Judge MD   metFORMIN (GLUCOPHAGE) 1000 MG tablet TAKE 1 TABLET BY MOUTH twice a day 3/3/20  Yes Yi Judge MD   diclofenac sodium 1 % GEL Apply 4 g topically 4 times daily 3/3/20  Yes Yi Judge MD   celecoxib (CELEBREX) 200 MG capsule TAKE 1 CAPSULE TWICE DAILY WITH FOOD AS NEEDED FOR PAIN stop ibuprofen 9/27/19  Yes Historical Provider, MD   cyclobenzaprine (FLEXERIL) 10 MG tablet TAKE 1 TABLET BY MOUTH TWICE DAILY AS NEEDED, take ONE-HALF TABLET if one is too sedating.  11/22/19  Yes Historical Provider, MD   oxyCODONE-acetaminophen (PERCOCET) 7.5-325 MG per tablet TAKE 1/2 (ONE-HALF) OF A TABLET BY MOUTH THREE TIMES DAILY AS DIRECTED for 20 days 4/16/19  Yes Historical Provider, MD   gabapentin (NEURONTIN) 400 MG capsule Take

## 2020-06-10 ENCOUNTER — TELEPHONE (OUTPATIENT)
Dept: PHARMACY | Facility: CLINIC | Age: 65
End: 2020-06-10

## 2020-06-10 NOTE — TELEPHONE ENCOUNTER
Only  PHSO: Yes  Total # of Interventions Recommended: 2  - New Order #: 0 New Medication Order Reason(s):  Adherence  - Refills Provided #: 0  - Maintenance Safety Lab Monitoring #: 1  Recommended intervention potential cost savings: 1  Total Interventions Accepted: 0  Time Spent (min): 5

## 2020-08-04 ENCOUNTER — TELEPHONE (OUTPATIENT)
Dept: PRIMARY CARE CLINIC | Age: 65
End: 2020-08-04

## 2020-08-04 NOTE — TELEPHONE ENCOUNTER
Oksana He was contacted to set up a video visit with Mika Chowdhury MD.     Selma Asa with: Patient    Patient encouraged to sign up for MyChart in order to complete Virtual Visits, if MyChart status was not already active. Patient agreeableto sign up for a Virtual Visit with PCP within the next week. Reason for declining VV with PCP in the next week:  Other - scheduled for 8/11/20      Kristy Lou

## 2020-08-11 ENCOUNTER — VIRTUAL VISIT (OUTPATIENT)
Dept: PRIMARY CARE CLINIC | Age: 65
End: 2020-08-11
Payer: MEDICARE

## 2020-08-11 PROBLEM — E66.01 MORBIDLY OBESE (HCC): Status: ACTIVE | Noted: 2020-08-11

## 2020-08-11 PROCEDURE — 99214 OFFICE O/P EST MOD 30 MIN: CPT | Performed by: INTERNAL MEDICINE

## 2020-08-11 PROCEDURE — G8427 DOCREV CUR MEDS BY ELIG CLIN: HCPCS | Performed by: INTERNAL MEDICINE

## 2020-08-11 PROCEDURE — 3017F COLORECTAL CA SCREEN DOC REV: CPT | Performed by: INTERNAL MEDICINE

## 2020-08-11 RX ORDER — METHYLPREDNISOLONE 4 MG/1
TABLET ORAL
Qty: 1 KIT | Refills: 0 | Status: SHIPPED | OUTPATIENT
Start: 2020-08-11 | End: 2021-08-09

## 2020-08-11 RX ORDER — BENZONATATE 100 MG/1
100-200 CAPSULE ORAL 3 TIMES DAILY PRN
Qty: 60 CAPSULE | Refills: 0 | Status: SHIPPED | OUTPATIENT
Start: 2020-08-11 | End: 2020-08-18

## 2020-08-11 RX ORDER — NICOTINE 21 MG/24HR
PATCH, TRANSDERMAL 24 HOURS TRANSDERMAL
Qty: 42 PATCH | Refills: 3 | Status: SHIPPED | OUTPATIENT
Start: 2020-08-11 | End: 2021-08-09

## 2020-08-11 RX ORDER — IPRATROPIUM BROMIDE AND ALBUTEROL SULFATE 2.5; .5 MG/3ML; MG/3ML
1 SOLUTION RESPIRATORY (INHALATION) EVERY 6 HOURS PRN
Qty: 360 ML | Refills: 3 | Status: SHIPPED | OUTPATIENT
Start: 2020-08-11 | End: 2022-04-06 | Stop reason: SDUPTHER

## 2020-08-11 RX ORDER — AZITHROMYCIN 250 MG/1
TABLET, FILM COATED ORAL
Qty: 1 PACKET | Refills: 0 | Status: SHIPPED | OUTPATIENT
Start: 2020-08-11 | End: 2021-08-09

## 2020-08-11 RX ORDER — CETIRIZINE HYDROCHLORIDE 10 MG/1
10 TABLET ORAL DAILY
Qty: 90 TABLET | Refills: 1 | Status: SHIPPED | OUTPATIENT
Start: 2020-08-11 | End: 2020-09-10

## 2020-08-11 RX ORDER — ASPIRIN 81 MG/1
81 TABLET ORAL DAILY
Qty: 90 TABLET | Refills: 1 | Status: SHIPPED | OUTPATIENT
Start: 2020-08-11 | End: 2021-08-09

## 2020-08-11 ASSESSMENT — ENCOUNTER SYMPTOMS
VOMITING: 0
SINUS PRESSURE: 1
COUGH: 1
DIARRHEA: 0
WHEEZING: 1
SORE THROAT: 1
SHORTNESS OF BREATH: 1
SINUS PAIN: 0
NAUSEA: 0
RHINORRHEA: 0
ABDOMINAL PAIN: 0

## 2020-08-11 NOTE — PROGRESS NOTES
2020    TELEHEALTH EVALUATION -- Audio/Visual (During [facility]-08 public health emergency)    HPI:  Hayley Cruz (:  1955) has requested an audio/video evaluation for the following concern(s):    Cough x 3 weeks  Pt presents with 3 weeks of cough productive of clear-colored sputum. Assoc chest pain upon coughing, no chest congestion. Assoc exertional SOB, no fever but had chills. Baseline generalized body aches and headache, increased wheezing, sore throat, no nausea or vomiting. No known sick contacts. No known exposure to COVID-19. No ear pain, asso sinus congestion, itchy eyes and nose, no runnynose. Fasting home BG is in the 120s on Lantus 15U nightly. Await repeat A1c ordered since 2020. HCC:  Morbid obesity- will avoid high carb and refined sugar. Will embark on 30-50 minute daily walks     Chronic bronchitis- still smokes about one-third pack per day. Review of Systems   Constitutional: Negative for chills, diaphoresis, fatigue and fever. HENT: Positive for congestion, sinus pressure and sore throat. Negative for ear discharge, ear pain, rhinorrhea, sinus pain and sneezing. Respiratory: Positive for cough, shortness of breath and wheezing. Cardiovascular: Negative for chest pain. Gastrointestinal: Negative for abdominal pain, diarrhea, nausea and vomiting. Endocrine: Negative for cold intolerance and heat intolerance. Genitourinary: Negative for dysuria and frequency. Neurological: Negative for dizziness and light-headedness. Prior to Visit Medications    Medication Sig Taking?  Authorizing Provider   benzonatate (TESSALON) 100 MG capsule Take 1-2 capsules by mouth 3 times daily as needed for Cough Yes Abram Bass MD   ipratropium-albuterol (DUONEB) 0.5-2.5 (3) MG/3ML SOLN nebulizer solution Take 3 mLs by nebulization every 6 hours as needed for Shortness of Breath Yes Abram Bass MD   azithromycin (ZITHROMAX) 250 MG tablet Take 2 tabs (500 mg) on Day 1, and take 1 tab (250 mg) on days 2 through 5. Yes Christina Waldrop MD   cetirizine (ZYRTEC) 10 MG tablet Take 1 tablet by mouth daily Yes Christina Waldrop MD   nicotine (NICODERM CQ) 14 MG/24HR Use 1 patch daily x 6 weeks, then switch to 7mg patch daily for 2 weeks, then stop Yes Christina Waldrop MD   nicotine (NICODERM CQ) 7 MG/24HR Use 1 patch daily x 2 weeks but start after completing the 14 mg patch Yes Christina Waldrop MD   methylPREDNISolone (MEDROL, COY,) 4 MG tablet Take by mouth. Yes Christina Waldrop MD   nicotine polacrilex (NICORETTE) 4 MG gum Take 1 each by mouth every 2 hours as needed for Smoking cessation Yes Christina Waldrop MD   aspirin EC 81 MG EC tablet Take 1 tablet by mouth daily Yes Christina Waldrop MD   atorvastatin (LIPITOR) 40 MG tablet TAKE 1 TABLET BY MOUTH DAILY  Christina Waldrop MD   insulin glargine (LANTUS SOLOSTAR) 100 UNIT/ML injection pen Inject 15 Units into the skin nightly  Christina Waldrop MD   Insulin Pen Needle 33G X 4 MM MISC Use fasting, 30 minutes after lunch, 30 minutes after dinner  Christina Waldrop MD   lisinopril (PRINIVIL;ZESTRIL) 5 MG tablet TAKE 1 TABLET BY MOUTH DAILY  Christina Waldrop MD   metFORMIN (GLUCOPHAGE) 1000 MG tablet TAKE 1 TABLET BY MOUTH twice a day  Christina Waldrop MD   diclofenac sodium 1 % GEL Apply 4 g topically 4 times daily  Christina Waldrop MD   celecoxib (CELEBREX) 200 MG capsule TAKE 1 CAPSULE TWICE DAILY WITH FOOD AS NEEDED FOR PAIN stop ibuprofen  Historical Provider, MD   cyclobenzaprine (FLEXERIL) 10 MG tablet TAKE 1 TABLET BY MOUTH TWICE DAILY AS NEEDED, take ONE-HALF TABLET if one is too sedating. Historical Provider, MD   oxyCODONE-acetaminophen (PERCOCET) 7.5-325 MG per tablet TAKE 1/2 (ONE-HALF) OF A TABLET BY MOUTH THREE TIMES DAILY AS DIRECTED for 20 days  Historical Provider, MD   gabapentin (NEURONTIN) 400 MG capsule Take 400 mg by mouth 2 times daily.    Historical Provider, MD   Handicap Placard MISC by Does not apply route  Leslie Dickinson, APRN - CNP   ONE TOUCH LANCETS MISC 1 each by Does not apply route daily  Tavares Cummings MD   blood glucose test strips (ONE TOUCH ULTRA TEST) strip 1 each by Subdermal route daily  Tavares Cummings MD   fluticasone-vilanterol (BREO ELLIPTA) 100-25 MCG/INH AEPB inhaler Inhale 1 puff into the lungs daily  Tavares Cummings MD   OXYGEN Inhale 2 L into the lungs daily. [facility]  Historical Provider, MD     Social History     Tobacco Use    Smoking status: Former Smoker     Packs/day: 0.50     Years: 30.00     Pack years: 15.00     Types: E-Cigarettes     Last attempt to quit: 2015     Years since quittin.6    Smokeless tobacco: Never Used   Substance Use Topics    Alcohol use:  Yes     Alcohol/week: 0.0 standard drinks     Frequency: Monthly or less     Drinks per session: 1 or 2     Binge frequency: Never     Comment: rare    Drug use: No      Allergies   Allergen Reactions    Codeine Hives    Invokana [Canagliflozin]      Yeast infection     Penicillins Hives    Strawberry Extract Hives    Vicodin [Hydrocodone-Acetaminophen] Hives   ,   Past Medical History:   Diagnosis Date    Arthritis     CAD (coronary artery disease)     2 stent  [facility]    COPD (chronic obstructive pulmonary disease) ([facility] [address]     Diverticulitis     Heart palpitations 10/12/2017    Smoker     Type II or unspecified type diabetes mellitus without mention of complication, not stated as uncontrolled    ,   Past Surgical History:   Procedure Laterality Date    COLON SURGERY      from diverticulitis    COLONOSCOPY N/A 2019    COLORECTAL CANCER SCREENING, NOT HIGH RISK performed by Meng Moreau MD at [address] [place] [address]      HERNIA REPAIR      HYSTERECTOMY      TONSILLECTOMY      TOTAL HIP ARTHROPLASTY     ,   Social History     Tobacco Use    Smoking status: Former Smoker     Packs/day: 0.50     Years: 30.00     Pack years: 15.00     Types: E-Cigarettes     Last attempt to quit: 2015     Years since quittin.6    Smokeless tobacco: Never Used   Substance Use Topics    Alcohol use: Yes     Alcohol/week: 0.0 standard drinks     Frequency: Monthly or less     Drinks per session: 1 or 2     Binge frequency: Never     Comment: rare    Drug use: No   ,   Family History   Problem Relation Age of Onset    Coronary Art Dis Mother     Cancer Father    PHYSICAL EXAMINATION:  [ INSTRUCTIONS:  \"[x]\" Indicates a positive item  \"[]\" Indicates a negative item  -- DELETE ALL ITEMS NOT EXAMINED]  Vital Signs: (As obtained by patient/caregiver or practitioner observation)    Blood pressure-  Heart rate-    Respiratory rate-    Temperature-  Pulse oximetry-     Constitutional: [x] Appears well-developed and well-nourished [x] No apparent distress      [] Abnormal-   Mental status  [x] Alert and awake  [x] Oriented to person/place/time [x]Able to follow commands      Eyes:  EOM    []  Normal  [] Abnormal-  Sclera  []  Normal  [] Abnormal -         Discharge []  None visible  [] Abnormal -    HENT:   [] Normocephalic, atraumatic. [] Abnormal   [] Mouth/Throat: Mucous membranes are moist.     External Ears [] Normal  [] Abnormal-     Neck: [] No visualized mass   Pulmonary/Chest: [x] Respiratory effort normal.  [x] No visualized signs of difficulty breathing or respiratory distress        [] Abnormal-    Musculoskeletal:   [] Normal gait with no signs of ataxia         [] Normal range of motion of neck        [] Abnormal-   Neurological:        [x] No Facial Asymmetry (Cranial nerve 7 motor function) (limited exam to video visit)          [x] No gaze palsy        [] Abnormal-         Skin:        [] No significant exanthematous lesions or discoloration noted on facial skin         [] Abnormal-            Psychiatric:       [] Normal Affect [] No Hallucinations        [] Abnormal-     Other pertinent observable physical exam findings-     ASSESSMENT/PLAN:  1. COPD with acute exacerbation (HCC)  - benzonatate (TESSALON) 100 MG capsule;  Take 1-2 capsules by mouth 3 times daily as needed for Cough  Dispense: 60 capsule; Refill: 0  - ipratropium-albuterol (DUONEB) 0.5-2.5 (3) MG/3ML SOLN nebulizer solution; Take 3 mLs by nebulization every 6 hours as needed for Shortness of Breath  Dispense: 360 mL; Refill: 3  - azithromycin (ZITHROMAX) 250 MG tablet; Take 2 tabs (500 mg) on Day 1, and take 1 tab (250 mg) on days 2 through 5. Dispense: 1 packet; Refill: 0  - methylPREDNISolone (MEDROL, COY,) 4 MG tablet; Take by mouth. Dispense: 1 kit; Refill: 0    2. Seasonal allergies  - cetirizine (ZYRTEC) 10 MG tablet; Take 1 tablet by mouth daily  Dispense: 90 tablet; Refill: 1    3. Tobacco abuse counseling  - nicotine (NICODERM CQ) 14 MG/24HR; Use 1 patch daily x 6 weeks, then switch to 7mg patch daily for 2 weeks, then stop  Dispense: 42 patch; Refill: 3  - nicotine (NICODERM CQ) 7 MG/24HR; Use 1 patch daily x 2 weeks but start after completing the 14 mg patch  Dispense: 14 patch; Refill: 3  - nicotine polacrilex (NICORETTE) 4 MG gum; Take 1 each by mouth every 2 hours as needed for Smoking cessation  Dispense: 110 each; Refill: 3    4. Chronic bronchitis, unspecified chronic bronchitis type ([facility] [address]  - Full PFT Study With Bronchodilator; Future    5. Morbidly obese ([address]  -pt will embark on lifestyle modification as mentioned above    6. Coronary artery disease involving native coronary artery of native heart without angina pectoris  - aspirin EC 81 MG EC tablet; Take 1 tablet by mouth daily  Dispense: 90 tablet; Refill: 1      Return in about 1 month (around 9/11/2020) for review of test results. Kem Larkin is a 63 y.o. female being evaluated by a Virtual Visit (video visit) encounter to address concerns as mentioned above. A caregiver was present when appropriate.  Due to this being a TeleHealth encounter (During [facility] public health emergency), evaluation of the following organ systems was limited:

## 2020-08-12 RX ORDER — IPRATROPIUM BROMIDE AND ALBUTEROL SULFATE 2.5; .5 MG/3ML; MG/3ML
SOLUTION RESPIRATORY (INHALATION)
OUTPATIENT
Start: 2020-08-12

## 2020-08-23 ENCOUNTER — VIRTUAL VISIT (OUTPATIENT)
Dept: PRIMARY CARE CLINIC | Age: 65
End: 2020-08-23
Payer: MEDICARE

## 2020-08-23 PROCEDURE — 3017F COLORECTAL CA SCREEN DOC REV: CPT | Performed by: NURSE PRACTITIONER

## 2020-08-23 PROCEDURE — G0439 PPPS, SUBSEQ VISIT: HCPCS | Performed by: NURSE PRACTITIONER

## 2020-08-23 PROCEDURE — G0444 DEPRESSION SCREEN ANNUAL: HCPCS | Performed by: NURSE PRACTITIONER

## 2020-08-23 ASSESSMENT — PATIENT HEALTH QUESTIONNAIRE - PHQ9: SUM OF ALL RESPONSES TO PHQ QUESTIONS 1-9: 4

## 2020-08-23 ASSESSMENT — LIFESTYLE VARIABLES: HOW OFTEN DO YOU HAVE A DRINK CONTAINING ALCOHOL: 0

## 2020-08-23 NOTE — PATIENT INSTRUCTIONS
Personalized Preventive Plan for Carlos Walls - 8/23/2020  Medicare offers a range of preventive health benefits. Some of the tests and screenings are paid in full while other may be subject to a deductible, co-insurance, and/or copay. Some of these benefits include a comprehensive review of your medical history including lifestyle, illnesses that may run in your family, and various assessments and screenings as appropriate. After reviewing your medical record and screening and assessments performed today your provider may have ordered immunizations, labs, imaging, and/or referrals for you. A list of these orders (if applicable) as well as your Preventive Care list are included within your After Visit Summary for your review. Other Preventive Recommendations:    · A preventive eye exam performed by an eye specialist is recommended every 1-2 years to screen for glaucoma; cataracts, macular degeneration, and other eye disorders. · A preventive dental visit is recommended every 6 months. · Try to get at least 150 minutes of exercise per week or 10,000 steps per day on a pedometer . · Order or download the FREE \"Exercise & Physical Activity: Your Everyday Guide\" from [employer]. Call 3-165.393.8875 or search [employer] online. · You need 3512-8501 mg of calcium and 2614-0427 IU of vitamin D per day. It is possible to meet your calcium requirement with diet alone, but a vitamin D supplement is usually necessary to meet this goal.  · When exposed to the sun, use a sunscreen that protects against both UVA and UVB radiation with an SPF of 30 or greater. Reapply every 2 to 3 hours or after sweating, drying off with a towel, or swimming. · Always wear a seat belt when traveling in a car. Always wear a helmet when riding a bicycle or motorcycle.   Patient Education        Well Visit, Women 48 to 72: Care Instructions  Your Care Instructions     Physical exams can help results, and other factors. Mammogram. Ask your doctor how often you should have a mammogram, which is an X-ray of your breasts. A mammogram can spot breast cancer before it can be felt and when it is easiest to treat. Pap test and pelvic exam. Ask your doctor how often you should have a Pap test. You may not need to have a Pap test as often as you used to. Vision. Have your eyes checked every year or two or as often as your doctor suggests. Some experts recommend that you have yearly exams for glaucoma and other age-related eye problems starting at age 48. Hearing. Tell your doctor if you notice any change in your hearing. You can have tests to find out how well you hear. Diabetes. Ask your doctor whether you should have tests for diabetes. Colorectal cancer. Your risk for colorectal cancer gets higher as you get older. Some experts say that adults should start regular screening at age 48 and stop at age 76. Others say to start before age 48 or continue after age 76. Talk with your doctor about your risk and when to start and stop screening. Thyroid disease. Talk to your doctor about whether to have your thyroid checked as part of a regular physical exam. Women have an increased chance of a thyroid problem. Osteoporosis. You should begin tests for bone density at age 72. If you are younger than 72, ask your doctor whether you have factors that may increase your risk for this disease. You may want to have this test before age 72. Heart attack and stroke risk. At least every 4 to 6 years, you should have your risk for heart attack and stroke assessed. Your doctor uses factors such as your age, blood pressure, cholesterol, and whether you smoke or have diabetes to show what your risk for a heart attack or stroke is over the next 10 years. When should you call for help? Watch closely for changes in your health, and be sure to contact your doctor if you have any problems or symptoms that concern you.   Where can you learn more? Go to https://chpepiceweb.healthCubito. org and sign in to your Delphix account. Enter T866 in the [place] box to learn more about \"Well Visit, Women 50 to 72: Care Instructions. \"     If you do not have an account, please click on the \"Sign Up Now\" link. Current as of: August 22, 2019               Content Version: 12.5  © 6771-0039 Eachbaby. Care instructions adapted under license by [facility]. If you have questions about a medical condition or this instruction, always ask your healthcare professional. [place] [address] any warranty or liability for your use of this information. Patient Education        Learning About Getting In and Out of a Bathtub Safely  Introduction  Many falls happen during bathing. All that water makes the bathroom a slippery place. You may no longer be able to step over the tub wall comfortably and safely. You might lean on things that aren't meant to support your weight, like a towel bar or the shower curtain. There are several types of aids that will help keep you safe. You can buy them at [employer] or home improvement stores or online. What tools can you use to get in and out of the tub? Tub rail and grab bar   There are many types of bars and rails you can install on the walls next to your tub or on the edge of the tub. They will help keep you safe while you're getting in or out of the tub. It's important to have them permanently installed, rather than attached with suction. Transfer bench   There are several kinds of benches or seats to use in the bathtub. One type sits in the tub and extends out over the side. You sit on the bench. Lift one leg at a time into the tub, sliding your body over as you do so. Another common tub bench sits inside the tub. To use this type you need to be able to safely step into the tub before sitting down.     Nonskid mats   Water makes both the floor of the tub and the bathroom floor slippery. You can buy adhesive strips that stick to the floor of the tub. Or you can use a nonskid tub mat. Outside the tub, make sure you use a rug with a nonskid bottom. Don't use a plain towel. Hand-held shower faucet   With a hand-held faucet, you can get clean without having to lower yourself into the tub. Hang a shower curtain to keep water from spilling out onto the floor. Follow-up care is a key part of your treatment and safety. Be sure to make and go to all appointments, and call your doctor if you are having problems. It's also a good idea to know your test results and keep a list of the medicines you take. Where can you learn more? Go to https://VeriTranlukaseb.Lekiosque.fr. org and sign in to your [employer] account. Enter X426 in the IndiaMART box to learn more about \"Learning About Getting In and Out of a Bathtub Safely. \"     If you do not have an account, please click on the \"Sign Up Now\" link. Current as of: March 2, 2020               Content Version: 12.5  © 9489-1149 Healthwise, [employer]ilvax. Care instructions adapted under license by [facility] ([facility]). If you have questions about a medical condition or this instruction, always ask your healthcare professional. [place] [address] any warranty or liability for your use of this information. Patient Education        Preventing Falls: Care Instructions  Your Care Instructions     Getting around your home safely can be a challenge if you have injuries or health problems that make it easy for you to fall. Loose rugs and furniture in walkways are among the dangers for many older people who have problems walking or who have poor eyesight. People who have conditions such as arthritis, osteoporosis, or dementia also have to be careful not to fall. You can make your home safer with a few simple measures. Follow-up care is a key part of your treatment and safety.  Be sure to make and go to all appointments, and call your doctor if you are having problems. It's also a good idea to know your test results and keep a list of the medicines you take. How can you care for yourself at home? Taking care of yourself  You may get dizzy if you do not drink enough water. To prevent dehydration, drink plenty of fluids, enough so that your urine is light yellow or clear like water. Choose water and other caffeine-free clear liquids. If you have kidney, heart, or liver disease and have to limit fluids, talk with your doctor before you increase the amount of fluids you drink. Exercise regularly to improve your strength, muscle tone, and balance. Walk if you can. Swimming may be a good choice if you cannot walk easily. Have your vision and hearing checked each year or any time you notice a change. If you have trouble seeing and hearing, you might not be able to avoid objects and could lose your balance. Know the side effects of the medicines you take. Ask your doctor or pharmacist whether the medicines you take can affect your balance. Sleeping pills or sedatives can affect your balance. Limit the amount of alcohol you drink. Alcohol can impair your balance and other senses. Ask your doctor whether calluses or corns on your feet need to be removed. If you wear loose-fitting shoes because of calluses or corns, you can lose your balance and fall. Talk to your doctor if you have numbness in your feet. Preventing falls at home  Remove raised doorway thresholds, throw rugs, and clutter. Repair loose carpet or raised areas in the floor. Move furniture and electrical cords to keep them out of walking paths. Use nonskid floor wax, and wipe up spills right away, especially on ceramic tile floors. If you use a walker or cane, put rubber tips on it. If you use crutches, clean the bottoms of them regularly with an abrasive pad, such as steel wool.   Keep your house well lit, especially stairways, porches, and outside walkways. Use night-lights in areas such as hallways and bathrooms. Add extra light switches or use remote switches (such as switches that go on or off when you clap your hands) to make it easier to turn lights on if you have to get up during the night. Install sturdy handrails on stairways. Move items in your cabinets so that the things you use a lot are on the lower shelves (about waist level). Keep a cordless phone and a flashlight with new batteries by your bed. If possible, put a phone in each of the main rooms of your house, or carry a cell phone in case you fall and cannot reach a phone. Or, you can wear a device around your neck or wrist. You push a button that sends a signal for help. Wear low-heeled shoes that fit well and give your feet good support. Use footwear with nonskid soles. Check the heels and soles of your shoes for wear. Repair or replace worn heels or soles. Do not wear socks without shoes on wood floors. Walk on the grass when the sidewalks are slippery. If you live in an area that gets snow and ice in the winter, sprinkle salt on slippery steps and sidewalks. Preventing falls in the bath  Install grab bars and nonskid mats inside and outside your shower or tub and near the toilet and sinks. Use shower chairs and bath benches. Use a hand-held shower head that will allow you to sit while showering. Get into a tub or shower by putting the weaker leg in first. Get out of a tub or shower with your strong side first.  Repair loose toilet seats and consider installing a raised toilet seat to make getting on and off the toilet easier. Keep your bathroom door unlocked while you are in the shower. Where can you learn more? Go to https://Culture Machinetyler.JobSerf. org and sign in to your Awareness Card account. Enter [id number] in the [facility] box to learn more about \"Preventing Falls: Care Instructions. \"     If you do not have an account, please click on the \"Sign Up Now\" link.  Current as of: August 7, 2019               Content Version: 12.5  © 9071-3319 Healthwise, LightPath Apps. Care instructions adapted under license by [facility]. If you have questions about a medical condition or this instruction, always ask your healthcare professional. [address] any warranty or liability for your use of this information. Patient Education        Diabetes and Preventing Falls: Care Instructions  Your Care Instructions     If you are an older adult who has diabetes, you may have a higher risk of falling. Complications of diabetes--such as nerve damage, foot problems, and reduced vision--may increase your risk of a fall. Some of your medicines also may add to your risk. By making your home safer, you can lower your risk of falling. Doing things to prevent diabetes complications may also help to lower your risk. You can make your home safer with a few simple measures. Follow-up care is a key part of your treatment and safety. Be sure to make and go to all appointments, and call your doctor if you are having problems. It's also a good idea to know your test results and keep a list of the medicines you take. How can you care for yourself at home? Taking care of yourself  Keep your blood sugar at a target level (which you set with your doctor). Exercise regularly to improve your strength, muscle tone, and balance. Walk if you can. Swimming may be a good choice if you cannot walk easily. Have your vision checked as often as your doctor recommends. It is usually once a year or more often if you have eye problems. Know the side effects of the medicines you take. Ask your doctor or pharmacist whether the medicines you take can affect your balance. Sleeping pills or sedatives can affect your balance. Limit the amount of alcohol you drink. Alcohol can impair your balance and other senses. Have your doctor check your feet during each visit.  If you have a foot Crosswise account. Enter R722 in the [facility] box to learn more about \"Diabetes and Preventing Falls: Care Instructions. \"     If you do not have an account, please click on the \"Sign Up Now\" link. Current as of: August 7, 2019               Content Version: 12.5  © 3708-5342 Healthwise, Incorporated. Care instructions adapted under license by [facility] ([facility]). If you have questions about a medical condition or this instruction, always ask your healthcare professional. [address] any warranty or liability for your use of this information. Patient Education        Learning About How to Make a Home Safe  Making your home safe: Overview  You can help protect the person in your care by making the home safe. Here are some general tips for how to lower the chance of getting injured in the home. Pad sharp corners on furniture and counter tops. Keep objects that are used often within easy reach. Install handrails around the toilet and in the shower. Use a tub mat to prevent slipping. Use a shower chair or bath bench when the person bathes. Provide good lighting inside and outside the home. Put night-lights in bedrooms, hallways, and bathrooms. Have light at the top and bottom of stairways. Have a first aid kit. It is also important to be aware of safe temperatures in the home. When helping someone bathe, use the back of your hand to test the water to make sure it's not too hot. Lower the temperature setting in the hot water heater to 120°F or lower to avoid burns. And make sure other liquids (such as coffee, tea, or soup) are not too hot. How can you protect from fire and carbon monoxide? Home safety alarms are very important. A smoke alarm can detect small amounts of smoke. This can allow time to escape from a fire. And a carbon monoxide alarm can let people know about this deadly gas before it starts to make them sick. Put smoke alarms:   On each level of the home, in the hallway outside sleeping areas, and inside each bedroom. In the center of a ceiling, or on a wall 6 to 12 inches from the ceiling. This is where smoke goes first. Avoid places near doors, windows, or air ducts. Put a carbon monoxide alarm in the hallway outside of the bedrooms in each sleeping area of the house. The alarm should be placed high on the wall. Make sure that the alarm can't be covered up by furniture or drapes. Test alarms regularly by pressing the test button. Replace non-lithium batteries in alarms twice a year. Have a plan for getting out of the home if there is a fire. Practice by having a fire drill. Keep a fire extinguisher in the kitchen. What can you do to prevent falls? You can help prevent falls by keeping rooms uncluttered, with clear walkways around furniture. Keep electrical cords off the floor, and remove throw rugs to prevent tripping. If there are steps in the home, make sure they all have handrails, and always use the handrails. Don't leave items on the steps, and be sure to fix any that are loose, broken, or uneven. How can you increase safety for people with dementia? If you are caring for someone who has dementia, you may need to make some extra changes to create a safe home. People with dementia have a loss of mental skills, such as memory, problem solving, and learning. So things that might not have been a danger to them before can cause safety problems now. Here are some things to consider:  Don't move furniture around. The person may become confused. Use locks on doors and cupboards. Lock up knives, scissors, medicines, cleaning supplies, and other dangerous items. Use hidden switches or controls for the stove, thermostat, water heater, and other appliances. If your loved one is still cooking, think about whether that is safe. It may be okay with some help, depending on your loved one's condition.  But for people who have memory or thinking problems, it's best to avoid any activities that might not be safe. If the person tends to wander or to try to leave the home, install motion-sensor lights on all doors and windows. Have emergency numbers in a central area near a phone. Include 911 and numbers for the doctor and family members. Get medical alert jewelry for the person so you can be contacted if he or she wanders away. If possible, provide a safe place for wandering, such as an enclosed yard or garden. Where can you learn more? Go to https://"[employer],"pepiceDeriv Technologieseb.4Tech. org and sign in to your GIVTED account. Enter F335 in the Expreem box to learn more about \"Learning About How to Make a Home Safe. \"     If you do not have an account, please click on the \"Sign Up Now\" link. Current as of: December 9, 2019               Content Version: 12.5  © 2006-2020 MitoProd. Care instructions adapted under license by [facility] ([facility]). If you have questions about a medical condition or this instruction, always ask your healthcare professional. [address] any warranty or liability for your use of this information. Patient Education        Learning About Dental Care  What is basic dental care? Basic dental care involves brushing and flossing your teeth regularly to remove plaque. Plaque is a thin film of bacteria that sticks to teeth above and below the gum line. It can build up and harden into tartar, which makes it harder to give the teeth a good cleaning. Tartar usually has to be removed by a dental hygienist.  The bacteria in plaque use sugars to make acids. These acids can damage the gums and teeth. Be sure to see your dentist and dental hygienist for regular checkups and cleanings. How can dental care affect your health? Practicing basic dental care:  Removes plaque that can cause gum disease and tooth decay. Tooth decay can lead to a hole in the tooth (cavity). Helps prevent bad breath.  Brushing and flossing rid your mouth of the bacteria that cause bad breath. Helps keep teeth white by preventing staining from food, drinks, and tobacco.  Makes it possible for your teeth to last a lifetime. What can you do to prevent dental problems? Brush your teeth twice a day, in the morning and at night. Use a toothbrush with soft, rounded-end bristles and a head that is small enough to reach all parts of your teeth and mouth. Replace your toothbrush every 3 to 4 months. Use a fluoride toothpaste. Place the brush at a 45-degree angle where the teeth meet the gums. Press firmly, and gently rock the brush back and forth using small circular movements. Brush chewing surfaces vigorously with short back-and-forth strokes. Brush your tongue from back to front. Floss at least once a day. Choose the type and flavor you like best.  Schedule checkups and cleanings as often as your dentist recommends it. Eat a healthy diet to help keep your gums healthy and your teeth strong. Choose foods that are good for your teeth, such as whole grains, vegetables, fruits, and foods that are low in saturated fat and sodium. Mozzarella and other cheeses, peanuts, yogurt, and milk are good for your teeth. Sugar-free chewing gum (especially gum that contains xylitol) is also a good choice. Avoid foods that contain a lot of sugar, especially sticky, sweet foods like taffy. Don't snack before bedtime. Food left on the teeth is more likely to cause tooth decay overnight. Don't smoke or use smokeless tobacco. Tobacco can make tooth decay worse. If you need help quitting, talk to your doctor about stop-smoking programs and medicines. These can increase your chances of quitting for good. Where can you learn more? Go to https://katia.[employer]. org and sign in to your Mobile Bridge account. Enter U889 in the [address] box to learn more about \"Learning About Dental Care. \"     If you do not have an account, please click on
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
## 2020-09-01 ENCOUNTER — TELEPHONE (OUTPATIENT)
Dept: PRIMARY CARE CLINIC | Age: 65
End: 2020-09-01

## 2020-09-01 NOTE — TELEPHONE ENCOUNTER
Scheduling outreach call to review Health Maintenance and / or schedule appointment. AWV done 8-23-20 w/ M. [place]  Colonoscopy  done  [facility]. GAP no  Lab work [id number] eye & foot exam  Mammogram done  PHQ-9 done  Last appointment 8-11-20 w/  and f/u in 1 month  Upcoming appointment no  Scanned and updated HM yes    Left message for patient to contact me at [phone].

## 2020-09-08 ENCOUNTER — TELEPHONE (OUTPATIENT)
Dept: PHARMACY | Facility: CLINIC | Age: 65
End: 2020-09-08

## 2020-09-08 NOTE — TELEPHONE ENCOUNTER
CLINICAL PHARMACY: ADHERENCE REVIEW  Identified care gap per Fidel; fills at [facility]: ACE/ARB and Statin adherence    Last Office Visit: 20    Patient also appears to be taking: Insulin     ASSESSMENT  ACE/ARB ADHERENCE  PDC: 1    Per [facility]   Lisinopril 5mg last filled on 20 for a 90 day supply; SIqd; last picked up on 20. 2 refills remaining. Billed through [facility] [employer]. BP Readings from Last 3 Encounters:   20 120/82   19 (!) 117/59   19 120/78     CrCl cannot be calculated (Patient's most recent lab result is older than the maximum 120 days allowed. ). STATIN ADHERENCE  PDC: 0.75    Per [address] [place] [address]   Atorvastatin 40m last filled on 20 for a 90 day supply; SIqd;  1 refills remaining. Billed through [address] with [address] [place] [address] that she had this medication fill, but never picked up on 8/3/20. Lab Results   Component Value Date    CHOL 301 (H) 2020    TRIG 178 (H) 2020    HDL 46 2020    LDLCALC 219 (H) 2020     ALT   Date Value Ref Range Status   2019 32 0 - 33 U/L Final     AST   Date Value Ref Range Status   2019 35 0 - 35 U/L Final     The 10-year ASCVD risk score (She Beaulieu et al., 2013) is: 14%    Values used to calculate the score:      Age: 63 years      Sex: Female      Is Non- : No      Diabetic: Yes      Tobacco smoker: No      Systolic Blood Pressure: 130 mmHg      Is BP treated: Yes      HDL Cholesterol: 46 mg/dL      Total Cholesterol: 301 mg/dL     PLAN  Attempting to reach patient to review.  Left message asking for return call.       Xin FinnD  [address]  Phone: [phone] option-7

## 2020-09-08 NOTE — LETTER
[address]  [address], [address]  Phone: [phone], option 106 [place] [address]           09/10/20     Dear Hugo Tan,    We tried to reach you recently regarding your Atorvastatin, but were unable to reach you on the telephone. It appears that this medication has not been filled at proper times. We are worried you might be missing doses or not taking it as directed. It is important that you take your medications regularly and try not to miss a single dose. The insurance says that the last time it was filled and picked up was 5/13/20. We have on file that you are currently taking Atorvastatin 40mg daily. If you are no longer taking it or taking it differently than above, please call us at the number below so that we can discuss this and update your medication profile.     Some ways to help you remember to take and refill your medications are to:  · Use a pill box, set an alarm, and/or keep your medication near something that you do every day  · Fill a 3-month supply of your prescription at a time to save you time and trips to the pharmacy  if you would like assistance in switching your prescriptions to a 3-month supply, please contact us  · Ask your pharmacy if they participate in [facility]", a program where you can  all of your medications on the same day each month  · Ask your pharmacy if you can be set up with automatic refill, where they will automatically refill your prescription when it is due and let you know it's ready to     Sincerely,   [address]  Phone: [phone], option 7

## 2020-09-10 NOTE — TELEPHONE ENCOUNTER
Attempted to reach again and was unsuccessful. Will prepare and send letter.     Shaina Reyes, PharmD  [address]  Phone: [phone] option-7      CLINICAL PHARMACY CONSULT: MED RECONCILIATION/REVIEW ADDENDUM    For Pharmacy Admin Tracking Only    PHSO: Yes  - New Therapy Lab Monitoring #: 1  Time Spent (min): [address] [facility], PharmD  [address]

## 2021-03-12 DIAGNOSIS — E11.9 TYPE 2 DIABETES MELLITUS WITHOUT COMPLICATION, WITHOUT LONG-TERM CURRENT USE OF INSULIN (HCC): ICD-10-CM

## 2021-03-12 DIAGNOSIS — I10 ESSENTIAL HYPERTENSION: ICD-10-CM

## 2021-03-14 DIAGNOSIS — E11.9 TYPE 2 DIABETES MELLITUS WITHOUT COMPLICATION, WITHOUT LONG-TERM CURRENT USE OF INSULIN (HCC): Primary | ICD-10-CM

## 2021-03-14 RX ORDER — LISINOPRIL 5 MG/1
TABLET ORAL
Qty: 90 TABLET | Refills: 3 | Status: SHIPPED | OUTPATIENT
Start: 2021-03-14 | End: 2022-03-10 | Stop reason: SDUPTHER

## 2021-06-29 ENCOUNTER — TELEPHONE (OUTPATIENT)
Dept: PRIMARY CARE CLINIC | Age: 66
End: 2021-06-29

## 2021-06-29 DIAGNOSIS — E78.5 HYPERLIPIDEMIA, UNSPECIFIED HYPERLIPIDEMIA TYPE: Primary | ICD-10-CM

## 2021-06-29 RX ORDER — SIMVASTATIN 40 MG
40 TABLET ORAL NIGHTLY
Qty: 90 TABLET | Refills: 1 | Status: SHIPPED | OUTPATIENT
Start: 2021-06-29 | End: 2021-12-28

## 2021-08-09 ENCOUNTER — OFFICE VISIT (OUTPATIENT)
Dept: PRIMARY CARE CLINIC | Age: 66
End: 2021-08-09
Payer: MEDICARE

## 2021-08-09 VITALS
WEIGHT: 207.2 LBS | RESPIRATION RATE: 22 BRPM | HEART RATE: 99 BPM | HEIGHT: 62 IN | DIASTOLIC BLOOD PRESSURE: 80 MMHG | SYSTOLIC BLOOD PRESSURE: 120 MMHG | TEMPERATURE: 98.5 F | OXYGEN SATURATION: 98 % | BODY MASS INDEX: 38.13 KG/M2

## 2021-08-09 DIAGNOSIS — R06.02 SOB (SHORTNESS OF BREATH): Primary | ICD-10-CM

## 2021-08-09 DIAGNOSIS — M25.512 CHRONIC LEFT SHOULDER PAIN: ICD-10-CM

## 2021-08-09 DIAGNOSIS — E11.9 TYPE 2 DIABETES MELLITUS WITHOUT COMPLICATION, WITHOUT LONG-TERM CURRENT USE OF INSULIN (HCC): ICD-10-CM

## 2021-08-09 DIAGNOSIS — R42 DIZZINESS: ICD-10-CM

## 2021-08-09 DIAGNOSIS — M79.622 LEFT UPPER ARM PAIN: ICD-10-CM

## 2021-08-09 DIAGNOSIS — E11.9 TYPE II DIABETES MELLITUS WITH HBA1C GOAL TO BE DETERMINED (HCC): ICD-10-CM

## 2021-08-09 DIAGNOSIS — R53.83 OTHER FATIGUE: ICD-10-CM

## 2021-08-09 DIAGNOSIS — E78.00 PURE HYPERCHOLESTEROLEMIA: ICD-10-CM

## 2021-08-09 DIAGNOSIS — G89.29 CHRONIC LEFT SHOULDER PAIN: ICD-10-CM

## 2021-08-09 DIAGNOSIS — E55.9 VITAMIN D DEFICIENCY: ICD-10-CM

## 2021-08-09 PROCEDURE — 99213 OFFICE O/P EST LOW 20 MIN: CPT | Performed by: NURSE PRACTITIONER

## 2021-08-09 PROCEDURE — 93000 ELECTROCARDIOGRAM COMPLETE: CPT | Performed by: NURSE PRACTITIONER

## 2021-08-09 RX ORDER — INSULIN GLARGINE 100 [IU]/ML
15 INJECTION, SOLUTION SUBCUTANEOUS NIGHTLY
Qty: 75 ML | Refills: 0 | Status: SHIPPED | OUTPATIENT
Start: 2021-08-09 | End: 2022-04-06 | Stop reason: SDUPTHER

## 2021-08-09 RX ORDER — ALBUTEROL SULFATE 90 UG/1
2 AEROSOL, METERED RESPIRATORY (INHALATION) 4 TIMES DAILY PRN
Qty: 1 INHALER | Refills: 0 | Status: SHIPPED | OUTPATIENT
Start: 2021-08-09 | End: 2022-04-06

## 2021-08-09 RX ORDER — BENZONATATE 100 MG/1
100-200 CAPSULE ORAL 3 TIMES DAILY PRN
Qty: 60 CAPSULE | Refills: 0 | Status: SHIPPED | OUTPATIENT
Start: 2021-08-09 | End: 2021-08-16

## 2021-08-09 SDOH — ECONOMIC STABILITY: FOOD INSECURITY: WITHIN THE PAST 12 MONTHS, YOU WORRIED THAT YOUR FOOD WOULD RUN OUT BEFORE YOU GOT MONEY TO BUY MORE.: SOMETIMES TRUE

## 2021-08-09 SDOH — ECONOMIC STABILITY: FOOD INSECURITY: WITHIN THE PAST 12 MONTHS, THE FOOD YOU BOUGHT JUST DIDN'T LAST AND YOU DIDN'T HAVE MONEY TO GET MORE.: SOMETIMES TRUE

## 2021-08-09 ASSESSMENT — ENCOUNTER SYMPTOMS
COLOR CHANGE: 0
DIARRHEA: 0
SINUS PRESSURE: 0
SINUS PAIN: 0
NAUSEA: 0
RHINORRHEA: 0
EYE REDNESS: 0
WHEEZING: 1
BACK PAIN: 1
CHEST TIGHTNESS: 1
ABDOMINAL DISTENTION: 0
EYE DISCHARGE: 0
TROUBLE SWALLOWING: 0
VOMITING: 0
EYE PAIN: 0
SORE THROAT: 0
SHORTNESS OF BREATH: 1
EYE ITCHING: 0
COUGH: 1
ABDOMINAL PAIN: 0

## 2021-08-09 ASSESSMENT — PATIENT HEALTH QUESTIONNAIRE - PHQ9
2. FEELING DOWN, DEPRESSED OR HOPELESS: 1
1. LITTLE INTEREST OR PLEASURE IN DOING THINGS: 1
SUM OF ALL RESPONSES TO PHQ QUESTIONS 1-9: 2
SUM OF ALL RESPONSES TO PHQ QUESTIONS 1-9: 2
SUM OF ALL RESPONSES TO PHQ9 QUESTIONS 1 & 2: 2
SUM OF ALL RESPONSES TO PHQ QUESTIONS 1-9: 2

## 2021-08-09 ASSESSMENT — SOCIAL DETERMINANTS OF HEALTH (SDOH): HOW HARD IS IT FOR YOU TO PAY FOR THE VERY BASICS LIKE FOOD, HOUSING, MEDICAL CARE, AND HEATING?: SOMEWHAT HARD

## 2021-08-09 NOTE — PATIENT INSTRUCTIONS
Patient Education        Shortness of Breath: Care Instructions  Your Care Instructions     Shortness of breath has many causes. Sometimes conditions such as anxiety can lead to shortness of breath. Some people get mild shortness of breath when they exercise. Trouble breathing also can be a symptom of a serious problem, such as asthma, lung disease, emphysema, heart problems, and pneumonia. If your shortness of breath continues, you may need tests and treatment. Watch for any changes in your breathing and other symptoms. Follow-up care is a key part of your treatment and safety. Be sure to make and go to all appointments, and call your doctor if you are having problems. It's also a good idea to know your test results and keep a list of the medicines you take. How can you care for yourself at home? · Do not smoke or allow others to smoke around you. If you need help quitting, talk to your doctor about stop-smoking programs and medicines. These can increase your chances of quitting for good. · Get plenty of rest and sleep. · Take your medicines exactly as prescribed. Call your doctor if you think you are having a problem with your medicine. · Find healthy ways to deal with stress. ? Exercise daily. ? Get plenty of sleep. ? Eat regularly and well. When should you call for help? Call 911 anytime you think you may need emergency care. For example, call if:    · You have severe shortness of breath.     · You have symptoms of a heart attack. These may include:  ? Chest pain or pressure, or a strange feeling in the chest.  ? Sweating. ? Shortness of breath. ? Nausea or vomiting. ? Pain, pressure, or a strange feeling in the back, neck, jaw, or upper belly or in one or both shoulders or arms. ? Lightheadedness or sudden weakness. ? A fast or irregular heartbeat. After you call 911, the  may tell you to chew 1 adult-strength or 2 to 4 low-dose aspirin. Wait for an ambulance.  Do not try to drive yourself. Call your doctor now or seek immediate medical care if:    · Your shortness of breath gets worse or you start to wheeze. Wheezing is a high-pitched sound when you breathe.     · You wake up at night out of breath or have to prop your head up on several pillows to breathe.     · You are short of breath after only light activity or while at rest.   Watch closely for changes in your health, and be sure to contact your doctor if:    · You do not get better over the next 1 to 2 days. Where can you learn more? Go to https://[employer]. org and sign in to your [employer] account. Enter S780 in the [employer] box to learn more about \"Shortness of Breath: Care Instructions. \"     If you do not have an account, please click on the \"Sign Up Now\" link. Current as of: October 26, 2020               Content Version: 12.9  © 2006-2021 Healthwise, [facility]. Care instructions adapted under license by [facility] ([facility]). If you have questions about a medical condition or this instruction, always ask your healthcare professional. [place] [address] any warranty or liability for your use of this information.

## 2021-08-09 NOTE — PROGRESS NOTES
Subjective:      Patient ID: Rufina Obrien is a 61 y.o. female who presents today for:  Chief Complaint   Patient presents with    Arm Pain     left since 4/06/21    Fatigue     x 1 month     Dizziness     x 1 month getting worse     Health Maintenance     went over     Headache     x 2 months        HPI  Patient is here with c/o left shoulder pain since her last Covid injection. Says she cant move the arm because her pain is severe. Says she was told it may be her muscle. Says it is more in the shoulder and radiates down to the elbow. Says it is painful with laying down and she is not able to lay on that side at night. Says it is also painful with movement. No weakness in the arm that she has noticed. She has numbness or tingling in the arm. Tingling stops at the FA. Says she has had dizziness and fatigue for the last. Says it has been going on off and on for the last month. Dizziness comes out of nowhere and sometimes feel like she is going to pass out. Says she feel like she is starting to fall and will grab on to something. Says it does not feel like equallibrium is off. Feels like everything goes dark. Says she has never passed out. Says she lays flat and that help. Says she has SOB but this is not new. Says she has no CP. She does have HX anxiety. Says she has HX CAD as well as family HX. Says she has palpitations at times. She is checking her BP at home but has not in awhile. She is not sure about the numbers. She is not checking her BS. Says she is checking her pulse ox and good with sitting, 89-90 with ambulation according to the patient. She has COPD. BYRNE for a few months. [place] right above eyes and to the back of the head. Says she has eye check last month and was normal.   No vision changes. She has sensitivity to light and sound with the HA. Says she takes percocet to get rib of the HA with some Motrin and lays in dark room. Uses ice back to back of neck.  Says she has neck pain off and on from her back. Says she has has migraine type HA in the past.    She is taking her metformin BID, Lantus 15 units at HS, and lisinopril 5mg. Past Medical History:   Diagnosis Date    Arthritis     CAD (coronary artery disease)     2 stent  [address] [facility]    COPD (chronic obstructive pulmonary disease) ([facility] [address]     Diverticulitis     Heart palpitations 10/12/2017    Smoker     Type II or unspecified type diabetes mellitus without mention of complication, not stated as uncontrolled      Past Surgical History:   Procedure Laterality Date    COLON SURGERY      from diverticulitis    COLONOSCOPY N/A 2019    COLORECTAL CANCER SCREENING, NOT HIGH RISK performed by Larissa Jarrell MD at [address] [place] [address], [address] [place] [address] WITH STENT PLACEMENT      HERNIA REPAIR      HYSTERECTOMY      TONSILLECTOMY      TOTAL HIP ARTHROPLASTY       Social History     Socioeconomic History    Marital status:      Spouse name: Not on file    Number of children: Not on file    Years of education: Not on file    Highest education level: Not on file   Occupational History    Not on file   Tobacco Use    Smoking status: Former Smoker     Packs/day: 0.50     Years: 30.00     Pack years: 15.00     Types: E-Cigarettes     Quit date: 2015     Years since quittin.6    Smokeless tobacco: Never Used   Vaping Use    Vaping Use: Never assessed   Substance and Sexual Activity    Alcohol use:  Yes     Alcohol/week: 0.0 standard drinks     Comment: rare    Drug use: No    Sexual activity: Never   Other Topics Concern    Not on file   Social History Narrative    Not on file     Social Determinants of Health     Financial Resource Strain: Medium Risk    Difficulty of Paying Living Expenses: Somewhat hard   Food Insecurity: Food Insecurity Present    Worried About Running Out of Food in the Last Year: Sometimes true    Ollie of Food in the Last Year: Sometimes true   Transportation Needs:  Insulin Pen Needle 33G X 4 MM MISC Use fasting, 30 minutes after lunch, 30 minutes after dinner 100 each 5    cyclobenzaprine (FLEXERIL) 10 MG tablet TAKE 1 TABLET BY MOUTH TWICE DAILY AS NEEDED, take ONE-HALF TABLET if one is too sedating. 0    oxyCODONE-acetaminophen (PERCOCET) 7.5-325 MG per tablet TAKE 1/2 (ONE-HALF) OF A TABLET BY MOUTH THREE TIMES DAILY AS DIRECTED for 20 days  0    ONE TOUCH LANCETS MISC 1 each by Does not apply route daily 100 each 3    blood glucose test strips (ONE TOUCH ULTRA TEST) strip 1 each by Subdermal route daily 100 each 5    OXYGEN Inhale 2 L into the lungs daily. [facility]      celecoxib (CELEBREX) 200 MG capsule TAKE 1 CAPSULE TWICE DAILY WITH FOOD AS NEEDED FOR PAIN stop ibuprofen (Patient not taking: Reported on 8/9/2021)  0    Handicap Placard MISC by Does not apply route 1 each 0    fluticasone-vilanterol (BREO ELLIPTA) 100-25 MCG/INH AEPB inhaler Inhale 1 puff into the lungs daily (Patient not taking: Reported on 8/9/2021) 4 each 0     No current facility-administered medications for this visit. Review of Systems   Constitutional: Positive for activity change. Negative for appetite change, chills, diaphoresis, fatigue, fever and unexpected weight change. HENT: Negative for congestion, ear pain, postnasal drip, rhinorrhea, sinus pressure, sinus pain, sneezing, sore throat, tinnitus and trouble swallowing. Eyes: Negative for pain, discharge, redness and itching. Respiratory: Positive for cough, chest tightness, shortness of breath and wheezing. Cardiovascular: Positive for chest pain, palpitations and leg swelling. Gastrointestinal: Negative for abdominal distention, abdominal pain, diarrhea, nausea and vomiting. Genitourinary: Negative for dysuria, frequency and urgency. Musculoskeletal: Positive for arthralgias, back pain and myalgias. Skin: Negative for color change and rash.    Neurological: Positive for dizziness, syncope (near neck supple. Lymphadenopathy:      Cervical: No cervical adenopathy. Right cervical: No superficial, deep or posterior cervical adenopathy. Left cervical: No superficial, deep or posterior cervical adenopathy. Skin:     General: Skin is warm and dry. Capillary Refill: Capillary refill takes less than 2 seconds. Neurological:      General: No focal deficit present. Mental Status: She is alert and oriented to person, place, and time. Mental status is at baseline. Cranial Nerves: Cranial nerves are intact. Sensory: Sensation is intact. Motor: Motor function is intact. Coordination: Coordination is intact. Gait: Gait is intact. Psychiatric:         Attention and Perception: Attention and perception normal.         Mood and Affect: Mood and affect normal.         Speech: Speech normal.         Behavior: Behavior normal. Behavior is cooperative. Thought Content: Thought content normal.         Cognition and Memory: Cognition and memory normal.         Judgment: Judgment normal.         Assessment:       Diagnosis Orders   1. SOB (shortness of breath)  EKG 12 Lead    XR CHEST STANDARD (2 VW)    CBC With Auto Differential    Comprehensive Metabolic Panel    TSH with Reflex    EKG 12 Lead   2. Chronic left shoulder pain  XR SHOULDER LEFT (MIN 2 VIEWS)   3. Left upper arm pain  XR HUMERUS LEFT (MIN 2 VIEWS)   4. Other fatigue  EKG 12 Lead    XR CHEST STANDARD (2 VW)    CBC With Auto Differential    Comprehensive Metabolic Panel    TSH with Reflex    EKG 12 Lead   5. Type II diabetes mellitus with HbA1C goal to be determined ([facility])  Hemoglobin A1C    Microalbumin / Creatinine Urine Ratio   6. Vitamin D deficiency  Vitamin D 25 Hydroxy   7. Pure hypercholesterolemia  Lipid, Fasting   8. Type 2 diabetes mellitus without complication, without long-term current use of insulin ([facility])  insulin glargine (LANTUS SOLOSTAR) 100 UNIT/ML injection pen   9.  Dizziness       No results found for this visit on 08/09/21. Plan:     Assessment & Plan   [facility] was seen today for arm pain, fatigue, dizziness, health maintenance and headache. Diagnoses and all orders for this visit:    SOB (shortness of breath)  EKG in office unchanged. She has HX COPD - given trelogy and albuterol samples- directions explained. Will also get CXR and call with results. -     EKG 12 Lead; Future  -     XR CHEST STANDARD (2 VW); Future  -     CBC With Auto Differential; Future  -     Comprehensive Metabolic Panel; Future  -     TSH with Reflex; Future  -     EKG 12 Lead    Chronic left shoulder pain  -     XR SHOULDER LEFT (MIN 2 VIEWS); Future    Left upper arm pain  Patient believes it may be d/t Covid 19 vaccine as it started within a few days of administration and is worsening. Discussed to cont to stretch and ice the area. Will get XR arm and shoulder to r/o other issues. -     XR HUMERUS LEFT (MIN 2 VIEWS); Future    Other fatigue  Will check some labs. Encouraged patient to check BP and BS at home daily. Advised to make a f/u with her PCP ASAP. Advised as she is having near syncope and dizziness would also suggest neurological and cardiac workup. EKG today neg but would suggest carotid US/MRI head as well as Echo, and Holter- she will f/u with her PCP for this. Advised ER with return of these symptoms.  -     EKG 12 Lead; Future  -     XR CHEST STANDARD (2 VW); Future  -     CBC With Auto Differential; Future  -     Comprehensive Metabolic Panel; Future  -     TSH with Reflex; Future  -     EKG 12 Lead    Type II diabetes mellitus with HbA1C goal to be determined [facility])  Discussed with patient she needs to check her BS at least daily and record. Labs ordered and advised to have them done today or tomorrow. Advised also to ensure she is taking both her oral and injectables as prescribed. Refills sent. Advised flucuations in BS may be the issues with some of her sx so will call with labs. Creatinine Urine Ratio     Standing Status:   Future     Standing Expiration Date:   8/9/2022    EKG 12 Lead     Standing Status:   Future     Number of Occurrences:   1     Standing Expiration Date:   8/9/2022     Order Specific Question:   Reason for Exam?     Answer:   Chest pain     Orders Placed This Encounter   Medications    insulin glargine (LANTUS SOLOSTAR) 100 UNIT/ML injection pen     Sig: Inject 15 Units into the skin nightly     Dispense:  75 mL     Refill:  0    benzonatate (TESSALON) 100 MG capsule     Sig: Take 1-2 capsules by mouth 3 times daily as needed for Cough     Dispense:  60 capsule     Refill:  0    albuterol sulfate HFA (VENTOLIN HFA) 108 (90 Base) MCG/ACT inhaler     Sig: Inhale 2 puffs into the lungs 4 times daily as needed for Wheezing     Dispense:  1 Inhaler     Refill:  0     Medications Discontinued During This Encounter   Medication Reason    aspirin EC 81 MG EC tablet LIST CLEANUP    azithromycin (ZITHROMAX) 250 MG tablet LIST CLEANUP    diclofenac sodium 1 % GEL LIST CLEANUP    gabapentin (NEURONTIN) 400 MG capsule LIST CLEANUP    methylPREDNISolone (MEDROL, COY,) 4 MG tablet LIST CLEANUP    nicotine (NICODERM CQ) 14 MG/24HR LIST CLEANUP    nicotine (NICODERM CQ) 7 MG/24HR LIST CLEANUP    nicotine polacrilex (NICORETTE) 4 MG gum LIST CLEANUP    LANTUS SOLOSTAR 100 UNIT/ML injection pen REORDER     Return for Make appointment with PCP to review results and for further workup. .        Reviewed with the patient/family: current clinical status & medications. Side effects of the medication prescribed today, as well as treatment plan/rationale and result expectations have been discussed with the patient/family who expresses understanding. Patient will be discharged home in stable condition. Follow up with PCP to evaluate treatment results or return if symptoms worsen or fail to improve. Discussed signs and symptoms which require immediate follow-up in ED/call to 911. Understanding verbalized. I have reviewed the patient's medical history in detail and updated the computerized patient record.     Fani Shipman, APRN - CNP

## 2021-08-10 RX ORDER — ALBUTEROL SULFATE 90 UG/1
2 AEROSOL, METERED RESPIRATORY (INHALATION) EVERY 6 HOURS PRN
Qty: 1 INHALER | Refills: 3 | OUTPATIENT
Start: 2021-08-10 | End: 2022-04-06 | Stop reason: SDUPTHER

## 2021-08-10 RX ORDER — FLUTICASONE FUROATE, UMECLIDINIUM BROMIDE AND VILANTEROL TRIFENATATE 200; 62.5; 25 UG/1; UG/1; UG/1
1 POWDER RESPIRATORY (INHALATION) DAILY
Qty: 2 EACH | Refills: 0 | OUTPATIENT
Start: 2021-08-10 | End: 2022-04-06

## 2021-08-13 DIAGNOSIS — J18.9 PNEUMONIA OF LEFT UPPER LOBE DUE TO INFECTIOUS ORGANISM: Primary | ICD-10-CM

## 2021-08-13 RX ORDER — AZITHROMYCIN 250 MG/1
250 TABLET, FILM COATED ORAL SEE ADMIN INSTRUCTIONS
Qty: 6 TABLET | Refills: 0 | Status: SHIPPED | OUTPATIENT
Start: 2021-08-13 | End: 2021-08-17 | Stop reason: ALTCHOICE

## 2021-08-17 ENCOUNTER — VIRTUAL VISIT (OUTPATIENT)
Dept: PRIMARY CARE CLINIC | Age: 66
End: 2021-08-17
Payer: MEDICARE

## 2021-08-17 DIAGNOSIS — M19.012 ARTHRITIS OF LEFT SHOULDER REGION: ICD-10-CM

## 2021-08-17 DIAGNOSIS — E11.9 TYPE II DIABETES MELLITUS WITH HBA1C GOAL TO BE DETERMINED (HCC): ICD-10-CM

## 2021-08-17 DIAGNOSIS — J18.9 PNEUMONIA DUE TO INFECTIOUS ORGANISM, UNSPECIFIED LATERALITY, UNSPECIFIED PART OF LUNG: Primary | ICD-10-CM

## 2021-08-17 DIAGNOSIS — Z71.6 TOBACCO ABUSE COUNSELING: ICD-10-CM

## 2021-08-17 DIAGNOSIS — N76.0 ACUTE VAGINITIS: ICD-10-CM

## 2021-08-17 PROCEDURE — 99442 PR PHYS/QHP TELEPHONE EVALUATION 11-20 MIN: CPT | Performed by: INTERNAL MEDICINE

## 2021-08-17 PROCEDURE — 99999 PR TOBACCO USE CESSATION INTERMEDIATE 3-10 MINUTES: CPT | Performed by: INTERNAL MEDICINE

## 2021-08-17 RX ORDER — FLUCONAZOLE 150 MG/1
150 TABLET ORAL ONCE
Qty: 1 TABLET | Refills: 0 | Status: SHIPPED | OUTPATIENT
Start: 2021-08-17 | End: 2021-08-17

## 2021-08-17 RX ORDER — PREDNISONE 20 MG/1
40 TABLET ORAL DAILY
Qty: 14 TABLET | Refills: 0 | Status: SHIPPED | OUTPATIENT
Start: 2021-08-17 | End: 2021-08-24

## 2021-08-17 RX ORDER — LEVOFLOXACIN 750 MG/1
750 TABLET ORAL DAILY
Qty: 7 TABLET | Refills: 0 | Status: SHIPPED | OUTPATIENT
Start: 2021-08-17 | End: 2021-08-24

## 2021-08-17 NOTE — PROGRESS NOTES
Bud Rios is a 61 y.o. female evaluated via telephone on 8/17/2021. Consent:  She and/or health care decision maker is aware that that she may receive a bill for this telephone service, depending on her insurance coverage, and has provided verbal consent to proceed: Yes    Documentation:  I communicated with the patient and/or health care decision maker. Details of this discussion including any medical advice provided:     Pt presents for follow up regarding recent pneumonia. Zpack completed, but no improvement in symptoms noted: SOB, wheezing. Hx COPD on Breo and albuterol inhalers. Still smokes cigarettes, but cutting down. Pt strongly advised to quit tobacco smoking. Options of Nicoderm, Chantix and Wellbutrin offered. Not ready to quit. Just developed vaginal itch post antibiotic use. Left shoulder pain since 4 months ago. Hx OA on XR. Will f/u with . A1c improved on Lantus 15U, metformin and Zocor. Hemoglobin A1C (%)   Date Value   08/11/2021 7.6   03/06/2020 9.9 (H)   02/18/2019 8   11/15/2017 9.3   09/11/2017 9.1 (H)     I affirm this is a Patient Initiated Episode with a Patient who has not had a related appointment within my department in the past 7 days or scheduled within the next 24 hours. Patient identification was verified at the start of the visit: Yes    Total Time: minutes: 11-20 minutes    The visit was conducted pursuant to the emergency declaration under the [address] [place] [address], [address] [place] [address] authority and the [employer] General Act. Patient identification was verified, and a caregiver was present when appropriate. The patient was located in a state where the provider was credentialed to provide care.     Note: not billable if this call serves to triage the patient into an appointment for the relevant concern      John Washburn MD

## 2021-08-24 ENCOUNTER — VIRTUAL VISIT (OUTPATIENT)
Dept: PRIMARY CARE CLINIC | Age: 66
End: 2021-08-24
Payer: MEDICARE

## 2021-08-24 DIAGNOSIS — Z20.822 CLOSE EXPOSURE TO COVID-19 VIRUS: Primary | ICD-10-CM

## 2021-08-24 DIAGNOSIS — J18.9 PNEUMONIA DUE TO INFECTIOUS ORGANISM, UNSPECIFIED LATERALITY, UNSPECIFIED PART OF LUNG: ICD-10-CM

## 2021-08-24 PROCEDURE — 99441 PR PHYS/QHP TELEPHONE EVALUATION 5-10 MIN: CPT | Performed by: INTERNAL MEDICINE

## 2021-08-24 RX ORDER — FLUCONAZOLE 150 MG/1
TABLET ORAL
COMMUNITY
Start: 2021-08-17 | End: 2022-04-06 | Stop reason: ALTCHOICE

## 2021-08-24 NOTE — PROGRESS NOTES
Padilla Preciado is a 61 y.o. female evaluated via telephone on 8/24/2021. Consent:  She and/or health care decision maker is aware that that she may receive a bill for this telephone service, depending on her insurance coverage, and has provided verbal consent to proceed: Yes    Documentation:  I communicated with the patient and/or health care decision maker. Details of this discussion including any medical advice provided:     Pt presents for follow up regarding pneumonia f/u. Levaquin and prednisone completed. Pneumonia symptoms much improved since. Attests to COVID exposure but no symptoms. Pt advised to remain isolated: For at least 10 days after onset of symptoms  At least 24 hours after fever resolution  Improvement in symptoms. If more SOB or fatigued, go to ER ASAP. Otherwise, rest, fluid and APAP prn. Will DC isolation, if COVID negative. I affirm this is a Patient Initiated Episode with a Patient who has not had a related appointment within my department in the past 7 days or scheduled within the next 24 hours. Patient identification was verified at the start of the visit: Yes    Total Time: minutes: 5-10 minutes    The visit was conducted pursuant to the emergency declaration under the [address] [place] [address], [address] [place] [address] authority and the [employer] General Act. Patient identification was verified, and a caregiver was present when appropriate. The patient was located in a state where the provider was credentialed to provide care.     Note: not billable if this call serves to triage the patient into an appointment for the relevant concern      Serita Aase, MD

## 2021-09-08 ENCOUNTER — TELEPHONE (OUTPATIENT)
Dept: FAMILY MEDICINE CLINIC | Age: 66
End: 2021-09-08

## 2021-09-08 NOTE — TELEPHONE ENCOUNTER
----- Message from Luke Chavira sent at 9/8/2021  7:33 AM EDT -----  Subject: Message to Provider    QUESTIONS  Information for Provider? Patient has been tested positive for covid. Patient will like to know if it's something she can take.  ---------------------------------------------------------------------------  --------------  CALL BACK INFO  What is the best way for the office to contact you? OK to leave message on   voicemail  Preferred Call Back Phone Number? [phone]  ---------------------------------------------------------------------------  --------------  SCRIPT ANSWERS  Relationship to Patient?  Self

## 2021-09-09 NOTE — TELEPHONE ENCOUNTER
Pt advised to remain isolated: For at least 10 days after onset of symptoms  At least 24 hours after fever resolution  Improvement in symptoms. If more SOB or fatigued, go to ER ASAP. Otherwise, rest, fluid and APAP prn.

## 2021-12-26 DIAGNOSIS — E78.5 HYPERLIPIDEMIA, UNSPECIFIED HYPERLIPIDEMIA TYPE: ICD-10-CM

## 2021-12-28 RX ORDER — SIMVASTATIN 40 MG
TABLET ORAL
Qty: 90 TABLET | Refills: 1 | Status: SHIPPED | OUTPATIENT
Start: 2021-12-28 | End: 2022-04-06 | Stop reason: SDUPTHER

## 2022-02-25 ENCOUNTER — TELEMEDICINE (OUTPATIENT)
Dept: PRIMARY CARE CLINIC | Age: 67
End: 2022-02-25
Payer: MEDICARE

## 2022-02-25 ENCOUNTER — TELEPHONE (OUTPATIENT)
Dept: PRIMARY CARE CLINIC | Age: 67
End: 2022-02-25

## 2022-02-25 DIAGNOSIS — Z00.00 MEDICARE ANNUAL WELLNESS VISIT, SUBSEQUENT: Primary | ICD-10-CM

## 2022-02-25 DIAGNOSIS — Z12.31 SCREENING MAMMOGRAM FOR BREAST CANCER: ICD-10-CM

## 2022-02-25 PROCEDURE — G0439 PPPS, SUBSEQ VISIT: HCPCS | Performed by: NURSE PRACTITIONER

## 2022-02-25 ASSESSMENT — LIFESTYLE VARIABLES
HOW OFTEN DO YOU HAVE A DRINK CONTAINING ALCOHOL: NEVER
HOW OFTEN DO YOU HAVE A DRINK CONTAINING ALCOHOL: NEVER

## 2022-02-25 NOTE — PATIENT INSTRUCTIONS
Personalized Preventive Plan for [facility] - 2/25/2022  Medicare offers a range of preventive health benefits. Some of the tests and screenings are paid in full while other may be subject to a deductible, co-insurance, and/or copay. Some of these benefits include a comprehensive review of your medical history including lifestyle, illnesses that may run in your family, and various assessments and screenings as appropriate. After reviewing your medical record and screening and assessments performed today your provider may have ordered immunizations, labs, imaging, and/or referrals for you. A list of these orders (if applicable) as well as your Preventive Care list are included within your After Visit Summary for your review. Other Preventive Recommendations:    · A preventive eye exam performed by an eye specialist is recommended every 1-2 years to screen for glaucoma; cataracts, macular degeneration, and other eye disorders. · A preventive dental visit is recommended every 6 months. · Try to get at least 150 minutes of exercise per week or 10,000 steps per day on a pedometer . · Order or download the FREE \"Exercise & Physical Activity: Your Everyday Guide\" from [employer]. Call 0-780.386.9301 or search [employer] online. · You need 5991-0890 mg of calcium and 1943-1230 IU of vitamin D per day. It is possible to meet your calcium requirement with diet alone, but a vitamin D supplement is usually necessary to meet this goal.  · When exposed to the sun, use a sunscreen that protects against both UVA and UVB radiation with an SPF of 30 or greater. Reapply every 2 to 3 hours or after sweating, drying off with a towel, or swimming. · Always wear a seat belt when traveling in a car. Always wear a helmet when riding a bicycle or motorcycle.

## 2022-02-25 NOTE — PROGRESS NOTES
Medicare Annual Wellness Visit    Laurence Santos is here for Medicare 1 Alan Ann was seen today for medicare awv. Diagnoses and all orders for this visit:    Medicare annual wellness visit, subsequent         Recommendations for Preventive Services Due: see orders and patient instructions/AVS.  Recommended screening schedule for the next 5-10 years is provided to the patient in written form: see Patient Instructions/AVS.     Return for Medicare Annual Wellness Visit in 1 year. Subjective       Patient's complete Health Risk Assessment and screening values have been reviewed and are found in Flowsheets. The following problems were reviewed today and where indicated follow up appointments were made and/or referrals ordered.     Positive Risk Factor Screenings with Interventions:    Fall Risk:  Do you feel unsteady or are you worried about falling? : no  2 or more falls in past year?: (!) yes (Slipped on snow/ice- no injury)  Fall with injury in past year?: no     Fall Risk Interventions:    · Patient declines any further evaluation/treatment for this issue      Tobacco Use:     Tobacco Use: High Risk    Smoking Tobacco Use: Current Every Day Smoker    Smokeless Tobacco Use: Never Used     E-Cigarettes/Vaping Use     Questions Responses    E-Cigarette/Vaping Use     Start Date     Passive Exposure     Quit Date     Counseling Given     Comments         Substance Abuse - Tobacco Interventions:  patient is not ready to work toward tobacco cessation at this time         General Health and ACP:  General  In general, how would you say your health is?: Good  In the past 7 days, have you experienced any of the following: New or Increased Pain, New or Increased Fatigue, Loneliness, Social Isolation, Stress or Anger?: (!) Yes  Select all that apply: (!) New or Increased Pain (Patient has chonic back pain that changes in intensity- seeing PM for this.)  Do you get the social and emotional support that you need?: Yes  Do you have a Living Will?: (!) No    Advance Directives     Power of [address] Will ACP-Advance Directive ACP-Power of     Not on File Filed on 08/21/19 Filed Not on File      General Health Risk Interventions:  · Pain issues: Patient is seeing PM for chronic lumbar back pain  · No Living Will: Patient declines ACP discussion/assistance    Health Habits/Nutrition:     Physical Activity: Inactive    Days of Exercise per Week: 0 days    Minutes of Exercise per Session: 0 min     Have you lost any weight without trying in the past 3 months?: No     Have you seen the dentist within the past year?: (!) No    Health Habits/Nutrition Interventions:  · Inadequate physical activity:  patient is not ready to increase his/her physical activity level at this time  · Dental exam overdue:  patient encouraged to make appointment with his/her dentist    Hearing/Vision:  Do you or your family notice any trouble with your hearing that hasn't been managed with hearing aids?: No  Do you have difficulty driving, watching TV, or doing any of your daily activities because of your eyesight?: No  Have you had an eye exam within the past year?: (!) No (Patient plans to make appointment.)  No exam data present    Hearing/Vision Interventions:  · Vision concerns:  patient encouraged to make appointment with his/her eye specialist     ADLs:  In the past 7 days, did you need help from others to perform any of the following everyday activities: Eating, dressing, grooming, bathing, toileting, or walking/balance?: No  In the past 7 days, did you need help from others to take care of any of the following: Laundry, housekeeping, banking/finances, shopping, telephone use, food preparation, transportation, or taking medications?: (!) Yes  Select all that apply: (!) Laundry (Friend does her laundry)    ADL Interventions:  · Patient declines any further evaluation/treatment for this issue          Objective Patient-Reported Vitals  No data recorded            Allergies   Allergen Reactions    Codeine Hives    Invokana [Canagliflozin]      Yeast infection     Penicillins Hives    Strawberry Extract Hives    Vicodin [Hydrocodone-Acetaminophen] Hives     Prior to Visit Medications    Medication Sig Taking?  Authorizing Provider   simvastatin (ZOCOR) 40 MG tablet TAKE 1 TABLET BY MOUTH EVERY NIGHT  Dragan Raymundo MD   fluconazole (DIFLUCAN) 150 MG tablet TAKE 1 TABLET BY MOUTH 1 TIME FOR 1 DOSE  Historical Provider, MD   Fluticasone-Umeclidin-Vilant (TRELEGY ELLIPTA) 200-62.5-25 MCG/INH AEPB Inhale 1 puff into the lungs daily Samples given Lot # [id number]   Exp 9/2022  SUPA Campuzano CNP   albuterol sulfate HFA (PROAIR HFA) 108 (90 Base) MCG/ACT inhaler Inhale 2 puffs into the lungs every 6 hours as needed for Wheezing Sample given Lot [id number]  Exp 09/2023  SUPA Campuzano CNP   insulin glargine (LANTUS SOLOSTAR) 100 UNIT/ML injection pen Inject 15 Units into the skin nightly  SUPA Campuzano CNP   albuterol sulfate HFA (VENTOLIN HFA) 108 (90 Base) MCG/ACT inhaler Inhale 2 puffs into the lungs 4 times daily as needed for Wheezing  SUPA Campuzano CNP   BD PEN NEEDLE AMELIA 2ND GEN 32G X 4 MM MISC USE AS DIRECTED TO INJECT INSULIN 30 MINUTES AFTER LUNCH, 30 MINUTES AFTER DINNER AND FASTING  Dragan Raymundo MD   metFORMIN (GLUCOPHAGE) 1000 MG tablet TAKE 1 TABLET BY MOUTH TWICE DAILY  Dragan Raymundo MD   lisinopril (PRINIVIL;ZESTRIL) 5 MG tablet TAKE 1 TABLET BY MOUTH DAILY  Dragan Raymundo MD   ipratropium-albuterol (DUONEB) 0.5-2.5 (3) MG/3ML SOLN nebulizer solution Take 3 mLs by nebulization every 6 hours as needed for Shortness of Breath  Dragan Raymundo MD   Insulin Pen Needle 33G X 4 MM MISC Use fasting, 30 minutes after lunch, 30 minutes after dinner  Dragan Raymundo MD   celecoxib (CELEBREX) 200 MG capsule TAKE 1 CAPSULE TWICE DAILY WITH FOOD AS NEEDED FOR PAIN stop ibuprofen  Historical Provider, MD   cyclobenzaprine (FLEXERIL) 10 MG tablet TAKE 1 TABLET BY MOUTH TWICE DAILY AS NEEDED, take ONE-HALF TABLET if one is too sedating. Historical Provider, MD   oxyCODONE-acetaminophen (PERCOCET) 7.5-325 MG per tablet TAKE 1/2 (ONE-HALF) OF A TABLET BY MOUTH THREE TIMES DAILY AS DIRECTED for 20 days  Historical Provider, MD   Handicap Placard MISC by Does not apply route  Leslie Anai Pal, APRN - CNP   ONE TOUCH LANCETS MISC 1 each by Does not apply route daily  Shahla Domingo MD   blood glucose test strips (ONE TOUCH ULTRA TEST) strip 1 each by Subdermal route daily  Shahla Domingo MD   fluticasone-vilanterol (BREO ELLIPTA) 100-25 MCG/INH AEPB inhaler Inhale 1 puff into the lungs daily  Shahla Domingo MD   OXYGEN Inhale 2 L into the lungs daily. [facility]  Historical Provider, MD Zelaya (Including outside providers/suppliers regularly involved in providing care):   Patient Care Team:  Yovani Espinosa MD as PCP - General (Internal Medicine)  Yovani Espinosa MD as PCP - [facility]  Gio Aviles MD (Urology)    Reviewed and updated this visit:  Tobacco  Allergies  Meds  Med Hx  Surg Hx  Soc Hx  Fam Hx           Constance Nordmann, was evaluated through a synchronous (real-time) audio-video encounter. The patient (or guardian if applicable) is aware that this is a billable service, which includes applicable co-pays. This Virtual Visit was conducted with patient's (and/or legal guardian's) consent. The visit was conducted pursuant to the emergency declaration under the [facility], [address] [place] [address] [facility] authority and the [employer] and [employer] General Act. Patient identification was verified, and a caregiver was present when appropriate. The patient was located at home in a state where the provider was licensed to provide care.

## 2022-02-28 ENCOUNTER — TELEPHONE (OUTPATIENT)
Dept: PRIMARY CARE CLINIC | Age: 67
End: 2022-02-28

## 2022-02-28 DIAGNOSIS — J44.1 COPD WITH ACUTE EXACERBATION (HCC): Primary | ICD-10-CM

## 2022-02-28 DIAGNOSIS — E11.9 TYPE 2 DIABETES MELLITUS WITHOUT COMPLICATION, WITHOUT LONG-TERM CURRENT USE OF INSULIN (HCC): ICD-10-CM

## 2022-02-28 RX ORDER — SOFT LENS DISINFECTANT
SOLUTION, NON-ORAL MISCELLANEOUS
Qty: 1 EACH | Refills: 0 | Status: SHIPPED | OUTPATIENT
Start: 2022-02-28 | End: 2022-04-07 | Stop reason: SDUPTHER

## 2022-02-28 NOTE — TELEPHONE ENCOUNTER
Pt called to tell us she needs a prior auth for Nebulizer before she is able to the prescription filled.     [phone]    [facility]

## 2022-03-01 NOTE — TELEPHONE ENCOUNTER
Marj Billingsley, I contacted the pharmacy does not need a PA but it needs to go to a [address] [place], [place] [address]e Se, can you use one of your contacts and get her one.   See she had one from 2018 from [facility] (order in [facility])

## 2022-03-02 ENCOUNTER — HOSPITAL ENCOUNTER (OUTPATIENT)
Dept: WOMENS IMAGING | Age: 67
Discharge: HOME | End: 2022-03-04
Payer: MEDICARE

## 2022-03-02 VITALS — HEIGHT: 63 IN | BODY MASS INDEX: 36.7 KG/M2

## 2022-03-02 DIAGNOSIS — Z12.31 SCREENING MAMMOGRAM FOR BREAST CANCER: ICD-10-CM

## 2022-03-02 PROCEDURE — 77063 BREAST TOMOSYNTHESIS BI: CPT

## 2022-03-02 NOTE — RESULT ENCOUNTER NOTE
Please advise patient JUANITO back and showing nothing suspicious- negative. Need to repeat in 1 year.

## 2022-03-10 DIAGNOSIS — I10 ESSENTIAL HYPERTENSION: ICD-10-CM

## 2022-03-10 RX ORDER — LISINOPRIL 5 MG/1
TABLET ORAL
Qty: 90 TABLET | Refills: 3 | Status: SHIPPED | OUTPATIENT
Start: 2022-03-10 | End: 2022-08-28 | Stop reason: DRUGHIGH

## 2022-04-06 ENCOUNTER — TELEPHONE (OUTPATIENT)
Dept: PRIMARY CARE CLINIC | Age: 67
End: 2022-04-06

## 2022-04-06 ENCOUNTER — OFFICE VISIT (OUTPATIENT)
Dept: PRIMARY CARE CLINIC | Age: 67
End: 2022-04-06
Payer: MEDICARE

## 2022-04-06 VITALS
TEMPERATURE: 97 F | DIASTOLIC BLOOD PRESSURE: 60 MMHG | RESPIRATION RATE: 18 BRPM | BODY MASS INDEX: 37.63 KG/M2 | HEART RATE: 100 BPM | WEIGHT: 212.4 LBS | SYSTOLIC BLOOD PRESSURE: 118 MMHG | OXYGEN SATURATION: 91 % | HEIGHT: 63 IN

## 2022-04-06 DIAGNOSIS — E11.9 TYPE 2 DIABETES MELLITUS WITHOUT COMPLICATION, WITHOUT LONG-TERM CURRENT USE OF INSULIN (HCC): ICD-10-CM

## 2022-04-06 DIAGNOSIS — E78.5 HYPERLIPIDEMIA, UNSPECIFIED HYPERLIPIDEMIA TYPE: ICD-10-CM

## 2022-04-06 DIAGNOSIS — J44.1 COPD WITH ACUTE EXACERBATION (HCC): ICD-10-CM

## 2022-04-06 DIAGNOSIS — M25.512 ACUTE PAIN OF LEFT SHOULDER: Primary | ICD-10-CM

## 2022-04-06 DIAGNOSIS — Z87.891 PERSONAL HISTORY OF TOBACCO USE: ICD-10-CM

## 2022-04-06 PROCEDURE — G0296 VISIT TO DETERM LDCT ELIG: HCPCS | Performed by: INTERNAL MEDICINE

## 2022-04-06 PROCEDURE — 99214 OFFICE O/P EST MOD 30 MIN: CPT | Performed by: INTERNAL MEDICINE

## 2022-04-06 PROCEDURE — 96372 THER/PROPH/DIAG INJ SC/IM: CPT | Performed by: INTERNAL MEDICINE

## 2022-04-06 RX ORDER — KETOROLAC TROMETHAMINE 10 MG/1
10 TABLET, FILM COATED ORAL EVERY 6 HOURS PRN
Qty: 20 TABLET | Refills: 0 | Status: SHIPPED | OUTPATIENT
Start: 2022-04-06 | End: 2022-08-23 | Stop reason: ALTCHOICE

## 2022-04-06 RX ORDER — FLUTICASONE FUROATE AND VILANTEROL TRIFENATATE 100; 25 UG/1; UG/1
1 POWDER RESPIRATORY (INHALATION) DAILY
Qty: 4 EACH | Refills: 3 | Status: SHIPPED | OUTPATIENT
Start: 2022-04-06 | End: 2022-08-03 | Stop reason: SDUPTHER

## 2022-04-06 RX ORDER — IPRATROPIUM BROMIDE AND ALBUTEROL SULFATE 2.5; .5 MG/3ML; MG/3ML
1 SOLUTION RESPIRATORY (INHALATION) EVERY 6 HOURS PRN
Qty: 360 ML | Refills: 3 | Status: SHIPPED | OUTPATIENT
Start: 2022-04-06 | End: 2022-04-07 | Stop reason: SDUPTHER

## 2022-04-06 RX ORDER — KETOROLAC TROMETHAMINE 30 MG/ML
60 INJECTION, SOLUTION INTRAMUSCULAR; INTRAVENOUS ONCE
Status: COMPLETED | OUTPATIENT
Start: 2022-04-06 | End: 2022-04-06

## 2022-04-06 RX ORDER — INSULIN GLARGINE 100 [IU]/ML
15 INJECTION, SOLUTION SUBCUTANEOUS NIGHTLY
Qty: 75 ML | Refills: 10 | Status: SHIPPED | OUTPATIENT
Start: 2022-04-06 | End: 2022-08-19

## 2022-04-06 RX ORDER — ALBUTEROL SULFATE 90 UG/1
2 AEROSOL, METERED RESPIRATORY (INHALATION) EVERY 6 HOURS PRN
Qty: 1 EACH | Refills: 5 | Status: SHIPPED | OUTPATIENT
Start: 2022-04-06

## 2022-04-06 RX ORDER — SIMVASTATIN 40 MG
TABLET ORAL
Qty: 90 TABLET | Refills: 3 | Status: SHIPPED | OUTPATIENT
Start: 2022-04-06 | End: 2022-07-18 | Stop reason: SDUPTHER

## 2022-04-06 RX ORDER — PEN NEEDLE, DIABETIC 32GX 5/32"
NEEDLE, DISPOSABLE MISCELLANEOUS
COMMUNITY
Start: 2022-02-28

## 2022-04-06 RX ORDER — CYCLOBENZAPRINE HCL 10 MG
TABLET ORAL
Qty: 30 TABLET | Refills: 0 | Status: SHIPPED | OUTPATIENT
Start: 2022-04-06

## 2022-04-06 RX ADMIN — KETOROLAC TROMETHAMINE 60 MG: 30 INJECTION, SOLUTION INTRAMUSCULAR; INTRAVENOUS at 09:57

## 2022-04-06 ASSESSMENT — ENCOUNTER SYMPTOMS
DIARRHEA: 0
SHORTNESS OF BREATH: 0
NAUSEA: 0
WHEEZING: 0
COUGH: 0
VOMITING: 0
ABDOMINAL PAIN: 0

## 2022-04-06 NOTE — TELEPHONE ENCOUNTER
----- Message from Sindy Gabriel sent at 4/6/2022  1:52 PM EDT -----  Subject: Message to Provider    QUESTIONS  Information for Provider? [employer] would like you to call her to let her know   where she can get a nebulizer machine and the medicine to use in t.  ---------------------------------------------------------------------------  --------------  CALL BACK INFO  What is the best way for the office to contact you? OK to leave message on   voicemail  Preferred Call Back Phone Number? [phone]  ---------------------------------------------------------------------------  --------------  SCRIPT ANSWERS  Relationship to Patient?  Self

## 2022-04-06 NOTE — PROGRESS NOTES
Subjective:      Patient ID: David Rao is a 74 y.o. female    Left shoulder pain x 1 yr   HPI   Pt presents with 1 year of sharp left shoulder pain, constant, rated 9/10, radiating down the left arm. Pain is worse with movement especially in the past 1 month. Assoc left face and neck pain and swelling. No antecedent trauma. No fever or chills, no tingling or numbness. Low pulse ox 91% on RA. Needs another portable O2 cylinder. Previous one repossessed by DME company. T2DM controlled on insulin, metformin.    Hemoglobin A1C (%)   Date Value   2021 7.6   2020 9.9 (H)   2019 8   11/15/2017 9.3   2017 9.1 (H)     Past Medical History:   Diagnosis Date    Arthritis     CAD (coronary artery disease)     2 stent  [facility]    COPD (chronic obstructive pulmonary disease) ([address]     Diverticulitis     Heart palpitations 10/12/2017    Smoker     Type II or unspecified type diabetes mellitus without mention of complication, not stated as uncontrolled      Past Surgical History:   Procedure Laterality Date    COLON SURGERY      from diverticulitis    COLONOSCOPY N/A 2019    COLORECTAL CANCER SCREENING, NOT HIGH RISK performed by Emily Vaughan MD at [address] [place] [address],[place] [address] WITH STENT PLACEMENT      HERNIA REPAIR      HYSTERECTOMY  age 46    total    OVARY REMOVAL      TONSILLECTOMY      TOTAL HIP ARTHROPLASTY       Social History     Socioeconomic History    Marital status:      Spouse name: Not on file    Number of children: Not on file    Years of education: Not on file    Highest education level: Not on file   Occupational History    Not on file   Tobacco Use    Smoking status: Current Every Day Smoker     Packs/day: 1.00     Years: 30.00     Pack years: 30.00     Types: E-Cigarettes, Cigarettes     Last attempt to quit: 2015     Years since quittin.2    Smokeless tobacco: Never Used   Vaping Use    Vaping Use: Not on file   Substance and Sexual Activity    Alcohol use: Yes     Alcohol/week: 0.0 standard drinks     Comment: rare    Drug use: No    Sexual activity: Never   Other Topics Concern    Not on file   Social History Narrative    Not on file     Social Determinants of Health     Financial Resource Strain: Medium Risk    Difficulty of Paying Living Expenses: Somewhat hard   Food Insecurity: Food Insecurity Present    Worried About Running Out of Food in the Last Year: Sometimes true    Ollie of Food in the Last Year: Sometimes true   Transportation Needs: Unmet Transportation Needs    Lack of Transportation (Medical):  Yes    Lack of Transportation (Non-Medical): Yes   Physical Activity: Inactive    Days of Exercise per Week: 0 days    Minutes of Exercise per Session: 0 min   Stress:     Feeling of Stress : Not on file   Social Connections:     Frequency of Communication with Friends and Family: Not on file    Frequency of Social Gatherings with Friends and Family: Not on file    Attends Sikhism Services: Not on file    Active Member of Clubs or Organizations: Not on file    Attends Club or Organization Meetings: Not on file    Marital Status: Not on file   Intimate Partner Violence:     Fear of Current or Ex-Partner: Not on file    Emotionally Abused: Not on file    Physically Abused: Not on file    Sexually Abused: Not on file   Housing Stability:     Unable to Pay for Housing in the Last Year: Not on file    Number of [place] in the Last Year: Not on file    Unstable Housing in the Last Year: Not on file     Family History   Problem Relation Age of Onset    Coronary Art Dis Mother     Cancer Father      Allergies:  Codeine, Invokana [canagliflozin], Penicillins, Strawberry extract, and Vicodin [hydrocodone-acetaminophen]  Patient Active Problem List   Diagnosis    Chronic obstructive lung disease ([facility]    Type 2 diabetes mellitus without complication, without long-term current use of insulin (HCC)    Coronary arteriosclerosis    Hyperlipidemia    Osteoarthritis    Heart palpitations    Acute encephalopathy    Osteoarthrosis, unspecified whether generalized or localized, pelvic region and thigh    Adenomatous polyp of colon    Adenomatous polyp of ascending colon    Morbidly obese (HCC)     Current Outpatient Medications on File Prior to Visit   Medication Sig Dispense Refill    BD PEN NEEDLE AMELIA 2ND GEN 32G X 4 MM MISC USE AS DIRECTED TO INJECT INSULIN 30 MINUTES AFTER LUNCH AND DINNER AND ALSO FASTING      lisinopril (PRINIVIL;ZESTRIL) 5 MG tablet TAKE 1 TABLET BY MOUTH DAILY 90 tablet 3    Insulin Pen Needle 33G X 4 MM MISC Use fasting, 30 minutes after lunch, 30 minutes after dinner 100 each 5    Respiratory Therapy Supplies (NEBULIZER) JAYLAN Use as needed for shortness of breath or wheezing 1 each 0    oxyCODONE-acetaminophen (PERCOCET) 7.5-325 MG per tablet TAKE 1/2 (ONE-HALF) OF A TABLET BY MOUTH THREE TIMES DAILY AS DIRECTED for 20 days  0    Handicap Placard MISC by Does not apply route 1 each 0    ONE TOUCH LANCETS MISC 1 each by Does not apply route daily 100 each 3    OXYGEN Inhale 2 L into the lungs daily. [facility] current facility-administered medications on file prior to visit. Review of Systems   Constitutional: Negative for chills, diaphoresis, fatigue and fever. HENT: Negative for congestion, ear discharge and ear pain. Respiratory: Negative for cough, shortness of breath and wheezing. Cardiovascular: Negative for chest pain. Gastrointestinal: Negative for abdominal pain, diarrhea, nausea and vomiting. Endocrine: Negative for cold intolerance and heat intolerance. Genitourinary: Negative for dysuria and frequency. Musculoskeletal: Positive for arthralgias. Negative for joint swelling. Neurological: Negative for dizziness and light-headedness. Psychiatric/Behavioral: Negative for decreased concentration.  The patient is not nervous/anxious. Objective:   /60   Pulse 100   Temp 97 °F (36.1 °C)   Resp 18   Ht 5' 3\" (1.6 m)   Wt 212 lb 6.4 oz (96.3 kg)   SpO2 91%   BMI 37.62 kg/m²     Physical Exam  Constitutional:       General: She is not in acute distress. Appearance: She is not diaphoretic. Comments: Nasal O2 at 3L-95% pulse ox    Cardiovascular:      Rate and Rhythm: Normal rate and regular rhythm. Pulses: Normal pulses. Heart sounds: Normal heart sounds, S1 normal and S2 normal.   Pulmonary:      Effort: Pulmonary effort is normal. No respiratory distress. Breath sounds: Normal breath sounds. No wheezing or rales. Chest:      Chest wall: No tenderness. Abdominal:      General: Bowel sounds are normal.      Tenderness: There is no abdominal tenderness. Musculoskeletal:      Comments: Left shoulder: no swelling but marked TTP  Neer positive  Adams positive  Empty can positive   Lift -off positive    Drop arm negative   Infraspinatus/teres minor positive   ROM: limited in abduction, flexion, extension, internal and external rotations     Neurological:      Mental Status: She is alert. Assessment:       Diagnosis Orders   1. Acute pain of left shoulder  cyclobenzaprine (FLEXERIL) 10 MG tablet    MRI SHOULDER LEFT WO CONTRAST    ketorolac (TORADOL) injection 60 mg    ketorolac (TORADOL) 10 MG tablet    ? ? rotator cuff tear   2. Type 2 diabetes mellitus without complication, without long-term current use of insulin (HCC) Controlled metFORMIN (GLUCOPHAGE) 1000 MG tablet    blood glucose test strips (ONE TOUCH ULTRA TEST) strip    insulin glargine (LANTUS SOLOSTAR) 100 UNIT/ML injection pen   3. Hyperlipidemia, unspecified hyperlipidemia type  simvastatin (ZOCOR) 40 MG tablet   4. Personal history of tobacco use  DC VISIT TO DISCUSS LUNG CA SCREEN W LDCT    CT Lung Screen (Annual)   5.  COPD with acute exacerbation (HCC) Stable albuterol sulfate HFA (PROAIR HFA) 108 (90 Base) MCG/ACT inhaler    ipratropium-albuterol (DUONEB) 0.5-2.5 (3) MG/3ML SOLN nebulizer solution    Nebulizers (COMPRESSOR/NEBULIZER) MISC    not in exacerbation   O2 prescription sent to "[employer]       Plan:      Orders Placed This Encounter   Procedures    [facility] (Annual)     Age: Patient is 74 y.o. Smoking History: Social History    Tobacco Use      Smoking status: Current Every Day Smoker        Packs/day: 1.00        Years: 30.00        Pack years: 30        Types: E-Cigarettes, Cigarettes        Quit date: 2015        Years since quittin.2      Smokeless tobacco: Never Used    Vaping Use      Vaping Use: Not on file    Alcohol use: Yes      Alcohol/week: 0.0 standard drinks      Comment: rare    Drug use: No   Pack years: 30    Date of last lung cancer screening: No previous lung cancer screening exam     Standing Status:   Future     Standing Expiration Date:   10/6/2023     Order Specific Question:   Is there documentation of shared decision making? Answer:   Yes     Order Specific Question:   Is this a low dose CT or a routine CT? Answer:   Low Dose CT [1]     Order Specific Question:   Is this the first (baseline) CT or an annual exam?     Answer: Annual [2]     Order Specific Question:   Does the patient show any signs or symptoms of lung cancer? Answer:   No     Order Specific Question:   Smoking Status? Answer:   Current Every Day Smoker [1]     Order Specific Question:   Smoking packs per day? Answer:   1     Order Specific Question:   Years smoking?      Answer:   27    MRI SHOULDER LEFT WO CONTRAST     Standing Status:   Future     Standing Expiration Date:   2023    [place] VISIT TO DISCUSS LUNG CA SCREEN W LDCT     Orders Placed This Encounter   Medications    metFORMIN (GLUCOPHAGE) 1000 MG tablet     Sig: TAKE 1 TABLET BY MOUTH TWICE DAILY     Dispense:  180 tablet     Refill:  3    albuterol sulfate HFA (PROAIR HFA) 108 (90 Base) MCG/ACT inhaler     Sig: Inhale 2 puffs into the lungs every 6 hours as needed for Wheezing     Dispense:  1 each     Refill:  5    blood glucose test strips (ONE TOUCH ULTRA TEST) strip     Si each by Subdermal route daily     Dispense:  100 each     Refill:  5    fluticasone-vilanterol (BREO ELLIPTA) 100-25 MCG/INH AEPB inhaler     Sig: Inhale 1 puff into the lungs daily     Dispense:  4 each     Refill:  3    insulin glargine (LANTUS SOLOSTAR) 100 UNIT/ML injection pen     Sig: Inject 15 Units into the skin nightly     Dispense:  75 mL     Refill:  10    simvastatin (ZOCOR) 40 MG tablet     Sig: TAKE 1 TABLET BY MOUTH EVERY NIGHT     Dispense:  90 tablet     Refill:  3    cyclobenzaprine (FLEXERIL) 10 MG tablet     Sig: TAKE 1 TABLET BY MOUTH TWICE DAILY AS NEEDED, take ONE-HALF TABLET if one is too sedating. Dispense:  30 tablet     Refill:  0    ipratropium-albuterol (DUONEB) 0.5-2.5 (3) MG/3ML SOLN nebulizer solution     Sig: Take 3 mLs by nebulization every 6 hours as needed for Shortness of Breath     Dispense:  360 mL     Refill:  3    Nebulizers (COMPRESSOR/NEBULIZER) MISC     Sig: Use with albuterol solution every 6 hours if needed for shortness of breath and wheezing     Dispense:  1 each     Refill:  3    ketorolac (TORADOL) injection 60 mg    ketorolac (TORADOL) 10 MG tablet     Sig: Take 1 tablet by mouth every 6 hours as needed for Pain     Dispense:  20 tablet     Refill:  0     No follow-ups on file. Low Dose CT (LDCT) Lung Screening criteria met:   Age 50-77(Medicare) or 50-80 (USPST)   Pack year smoking >20   Still smoking or less than 15 year since quit   No sign or symptoms of lung cancer   > 11 months since last LDCT   Risks and benefits of lung cancer screening with LDCT scans discussed:  Significance of positive screen - False-positive LDCT results often occur. 95% of all positive results do not lead to a diagnosis of cancer.  Usually further imaging can resolve most false-positive results; however, some patients may require invasive procedures. Over diagnosis risk - 10% to 12% of screen-detected lung cancer cases are over diagnosed--that is, the cancer would not have been detected in the patient's lifetime without the screening. Need for follow up screens annually to continue lung cancer screening effectiveness   Risks associated with radiation from annual LDCT- Radiation exposure is about the same as for a mammogram, which is about 1/3 of the annual background radiation exposure from everyday life. Starting screening at age 54 is not likely to increase cancer risk from radiation exposure. Patients with comorbidities resulting in life expectancy of < 10 years, or that would preclude treatment of an abnormality identified on CT, should not be screened due to lack of benefit.   To obtain maximal benefit from this screening, smoking cessation and long-term abstinence from smoking is critical

## 2022-04-07 ENCOUNTER — TELEPHONE (OUTPATIENT)
Dept: PRIMARY CARE CLINIC | Age: 67
End: 2022-04-07

## 2022-04-07 DIAGNOSIS — J44.1 COPD WITH ACUTE EXACERBATION (HCC): ICD-10-CM

## 2022-04-07 RX ORDER — IPRATROPIUM BROMIDE AND ALBUTEROL SULFATE 2.5; .5 MG/3ML; MG/3ML
1 SOLUTION RESPIRATORY (INHALATION) EVERY 6 HOURS PRN
Qty: 360 ML | Refills: 3 | Status: SHIPPED | OUTPATIENT
Start: 2022-04-07

## 2022-04-07 RX ORDER — SOFT LENS DISINFECTANT
SOLUTION, NON-ORAL MISCELLANEOUS
Qty: 1 EACH | Refills: 0 | Status: SHIPPED | OUTPATIENT
Start: 2022-04-07

## 2022-04-12 ENCOUNTER — TELEPHONE (OUTPATIENT)
Dept: CASE MANAGEMENT | Age: 67
End: 2022-04-12

## 2022-06-17 ENCOUNTER — COMMUNITY OUTREACH (OUTPATIENT)
Dept: PRIMARY CARE CLINIC | Age: 67
End: 2022-06-17

## 2022-06-17 NOTE — PROGRESS NOTES
Patient's HM shows she's due for A1C Test 8/11/22  RIWI and  files searched without success. No results to attach to order nor HM updated. Called pt; No answer; LDVM in regards to having lab work complete and scheduling Diabetes Follow-up.

## 2022-07-13 DIAGNOSIS — E78.5 HYPERLIPIDEMIA, UNSPECIFIED HYPERLIPIDEMIA TYPE: ICD-10-CM

## 2022-07-18 RX ORDER — SIMVASTATIN 40 MG
TABLET ORAL
Qty: 90 TABLET | Refills: 3 | Status: SHIPPED | OUTPATIENT
Start: 2022-07-18

## 2022-07-20 ENCOUNTER — TELEPHONE (OUTPATIENT)
Dept: PRIMARY CARE CLINIC | Age: 67
End: 2022-07-20

## 2022-07-20 DIAGNOSIS — Z12.11 SPECIAL SCREENING FOR MALIGNANT NEOPLASMS, COLON: Primary | ICD-10-CM

## 2022-07-20 NOTE — TELEPHONE ENCOUNTER
Spoke to patient, agreeable to colonoscopy. Patient scheduled 11/15/22 at 9:30 am. Miralax Prep mailed to patient. Referral pended to PCP. Info faxed to Lloyd Benito.

## 2022-08-03 RX ORDER — FLUTICASONE FUROATE AND VILANTEROL 100; 25 UG/1; UG/1
1 POWDER RESPIRATORY (INHALATION) DAILY
Qty: 4 EACH | Refills: 3 | Status: SHIPPED | OUTPATIENT
Start: 2022-08-03

## 2022-08-05 NOTE — TELEPHONE ENCOUNTER
Completed patients AWV today and she mentions that she needs refills on pen needles and nebulizer machine is broken and needs a script sent to pharmacy for a new one. Crescentic Advancement Flap Text: The defect edges were debeveled with a #15 scalpel blade.  Given the location of the defect and the proximity to free margins a crescentic advancement flap was deemed most appropriate.  Using a sterile surgical marker, the appropriate advancement flap was drawn incorporating the defect and placing the expected incisions within the relaxed skin tension lines where possible.    The area thus outlined was incised deep to adipose tissue with a #15 scalpel blade.  The skin margins were undermined to an appropriate distance in all directions utilizing iris scissors.

## 2022-08-18 DIAGNOSIS — E11.9 TYPE 2 DIABETES MELLITUS WITHOUT COMPLICATION, WITHOUT LONG-TERM CURRENT USE OF INSULIN (HCC): ICD-10-CM

## 2022-08-19 RX ORDER — INSULIN GLARGINE 100 [IU]/ML
INJECTION, SOLUTION SUBCUTANEOUS
Qty: 75 ML | Refills: 10 | Status: SHIPPED | OUTPATIENT
Start: 2022-08-19

## 2022-08-23 ENCOUNTER — OFFICE VISIT (OUTPATIENT)
Dept: PRIMARY CARE CLINIC | Age: 67
End: 2022-08-23
Payer: MEDICARE

## 2022-08-23 ENCOUNTER — TELEPHONE (OUTPATIENT)
Dept: PRIMARY CARE CLINIC | Age: 67
End: 2022-08-23

## 2022-08-23 VITALS
SYSTOLIC BLOOD PRESSURE: 120 MMHG | OXYGEN SATURATION: 95 % | RESPIRATION RATE: 18 BRPM | HEIGHT: 63 IN | TEMPERATURE: 97.7 F | BODY MASS INDEX: 37.92 KG/M2 | DIASTOLIC BLOOD PRESSURE: 70 MMHG | HEART RATE: 98 BPM | WEIGHT: 214 LBS

## 2022-08-23 DIAGNOSIS — E11.9 TYPE 2 DIABETES MELLITUS WITHOUT COMPLICATION, WITHOUT LONG-TERM CURRENT USE OF INSULIN (HCC): ICD-10-CM

## 2022-08-23 DIAGNOSIS — Z78.0 POST-MENOPAUSAL: ICD-10-CM

## 2022-08-23 DIAGNOSIS — Z71.6 TOBACCO ABUSE COUNSELING: ICD-10-CM

## 2022-08-23 DIAGNOSIS — R09.02 HYPOXIA: Primary | ICD-10-CM

## 2022-08-23 DIAGNOSIS — J44.9 CHRONIC OBSTRUCTIVE PULMONARY DISEASE, UNSPECIFIED COPD TYPE (HCC): ICD-10-CM

## 2022-08-23 DIAGNOSIS — Z91.81 AT HIGH RISK FOR FALLS: ICD-10-CM

## 2022-08-23 LAB
CREATININE URINE: 30.7 MG/DL
HBA1C MFR BLD: 6.8 %
MICROALBUMIN UR-MCNC: 1.6 MG/DL
MICROALBUMIN/CREAT UR-RTO: 52.1 MG/G (ref 0–30)

## 2022-08-23 PROCEDURE — 1123F ACP DISCUSS/DSCN MKR DOCD: CPT | Performed by: INTERNAL MEDICINE

## 2022-08-23 PROCEDURE — 3044F HG A1C LEVEL LT 7.0%: CPT | Performed by: INTERNAL MEDICINE

## 2022-08-23 PROCEDURE — 83036 HEMOGLOBIN GLYCOSYLATED A1C: CPT | Performed by: INTERNAL MEDICINE

## 2022-08-23 PROCEDURE — 99214 OFFICE O/P EST MOD 30 MIN: CPT | Performed by: INTERNAL MEDICINE

## 2022-08-23 PROCEDURE — 99406 BEHAV CHNG SMOKING 3-10 MIN: CPT | Performed by: INTERNAL MEDICINE

## 2022-08-23 SDOH — ECONOMIC STABILITY: FOOD INSECURITY: WITHIN THE PAST 12 MONTHS, YOU WORRIED THAT YOUR FOOD WOULD RUN OUT BEFORE YOU GOT MONEY TO BUY MORE.: NEVER TRUE

## 2022-08-23 SDOH — ECONOMIC STABILITY: FOOD INSECURITY: WITHIN THE PAST 12 MONTHS, THE FOOD YOU BOUGHT JUST DIDN'T LAST AND YOU DIDN'T HAVE MONEY TO GET MORE.: NEVER TRUE

## 2022-08-23 ASSESSMENT — PATIENT HEALTH QUESTIONNAIRE - PHQ9
1. LITTLE INTEREST OR PLEASURE IN DOING THINGS: 0
6. FEELING BAD ABOUT YOURSELF - OR THAT YOU ARE A FAILURE OR HAVE LET YOURSELF OR YOUR FAMILY DOWN: 0
9. THOUGHTS THAT YOU WOULD BE BETTER OFF DEAD, OR OF HURTING YOURSELF: 0
4. FEELING TIRED OR HAVING LITTLE ENERGY: 0
SUM OF ALL RESPONSES TO PHQ QUESTIONS 1-9: 0
8. MOVING OR SPEAKING SO SLOWLY THAT OTHER PEOPLE COULD HAVE NOTICED. OR THE OPPOSITE, BEING SO FIGETY OR RESTLESS THAT YOU HAVE BEEN MOVING AROUND A LOT MORE THAN USUAL: 0
SUM OF ALL RESPONSES TO PHQ QUESTIONS 1-9: 0
SUM OF ALL RESPONSES TO PHQ QUESTIONS 1-9: 0
SUM OF ALL RESPONSES TO PHQ9 QUESTIONS 1 & 2: 0
3. TROUBLE FALLING OR STAYING ASLEEP: 0
7. TROUBLE CONCENTRATING ON THINGS, SUCH AS READING THE NEWSPAPER OR WATCHING TELEVISION: 0
SUM OF ALL RESPONSES TO PHQ QUESTIONS 1-9: 0
5. POOR APPETITE OR OVEREATING: 0
2. FEELING DOWN, DEPRESSED OR HOPELESS: 0
10. IF YOU CHECKED OFF ANY PROBLEMS, HOW DIFFICULT HAVE THESE PROBLEMS MADE IT FOR YOU TO DO YOUR WORK, TAKE CARE OF THINGS AT HOME, OR GET ALONG WITH OTHER PEOPLE: 0

## 2022-08-23 ASSESSMENT — ENCOUNTER SYMPTOMS
ABDOMINAL PAIN: 0
WHEEZING: 0
COUGH: 0
BACK PAIN: 1
VOMITING: 0
SHORTNESS OF BREATH: 1
DIARRHEA: 0
NAUSEA: 0

## 2022-08-23 ASSESSMENT — SOCIAL DETERMINANTS OF HEALTH (SDOH): HOW HARD IS IT FOR YOU TO PAY FOR THE VERY BASICS LIKE FOOD, HOUSING, MEDICAL CARE, AND HEATING?: NOT HARD AT ALL

## 2022-08-23 ASSESSMENT — COLUMBIA-SUICIDE SEVERITY RATING SCALE - C-SSRS
2. HAVE YOU ACTUALLY HAD ANY THOUGHTS OF KILLING YOURSELF?: NO
6. HAVE YOU EVER DONE ANYTHING, STARTED TO DO ANYTHING, OR PREPARED TO DO ANYTHING TO END YOUR LIFE?: NO
1. WITHIN THE PAST MONTH, HAVE YOU WISHED YOU WERE DEAD OR WISHED YOU COULD GO TO SLEEP AND NOT WAKE UP?: NO

## 2022-08-23 NOTE — TELEPHONE ENCOUNTER
She is also able to use [facility], [facility], and [facility].

## 2022-08-23 NOTE — PROGRESS NOTES
AT BEDTIME 75 mL 10    fluticasone-vilanterol (BREO ELLIPTA) 100-25 MCG/INH AEPB inhaler Inhale 1 puff into the lungs in the morning. 4 each 3    simvastatin (ZOCOR) 40 MG tablet TAKE 1 TABLET BY MOUTH EVERY NIGHT 90 tablet 3    ipratropium-albuterol (DUONEB) 0.5-2.5 (3) MG/3ML SOLN nebulizer solution Take 3 mLs by nebulization every 6 hours as needed for Shortness of Breath 360 mL 3    Respiratory Therapy Supplies (NEBULIZER) JAYLAN Use as needed for shortness of breath or wheezing 1 each 0    BD PEN NEEDLE AMELIA 2ND GEN 32G X 4 MM MISC USE AS DIRECTED TO INJECT INSULIN 30 MINUTES AFTER LUNCH AND DINNER AND ALSO FASTING      metFORMIN (GLUCOPHAGE) 1000 MG tablet TAKE 1 TABLET BY MOUTH TWICE DAILY 180 tablet 3    albuterol sulfate HFA (PROAIR HFA) 108 (90 Base) MCG/ACT inhaler Inhale 2 puffs into the lungs every 6 hours as needed for Wheezing 1 each 5    blood glucose test strips (ONE TOUCH ULTRA TEST) strip 1 each by Subdermal route daily 100 each 5    cyclobenzaprine (FLEXERIL) 10 MG tablet TAKE 1 TABLET BY MOUTH TWICE DAILY AS NEEDED, take ONE-HALF TABLET if one is too sedating. 30 tablet 0    Nebulizers (COMPRESSOR/NEBULIZER) MISC Use with albuterol solution every 6 hours if needed for shortness of breath and wheezing 1 each 3    lisinopril (PRINIVIL;ZESTRIL) 5 MG tablet TAKE 1 TABLET BY MOUTH DAILY 90 tablet 3    Insulin Pen Needle 33G X 4 MM MISC Use fasting, 30 minutes after lunch, 30 minutes after dinner 100 each 5    oxyCODONE-acetaminophen (PERCOCET) 7.5-325 MG per tablet TAKE 1/2 (ONE-HALF) OF A TABLET BY MOUTH THREE TIMES DAILY AS DIRECTED for 20 days  0    Handicap Placard MISC by Does not apply route 1 each 0    ONE TOUCH LANCETS MISC 1 each by Does not apply route daily 100 each 3    OXYGEN Inhale 2 L into the lungs daily. [facility]       No current facility-administered medications on file prior to visit. Review of Systems   Constitutional:  Negative for chills, diaphoresis, fatigue and fever. HENT:  Negative for congestion, ear discharge and ear pain. Respiratory:  Positive for shortness of breath. Negative for cough and wheezing. Cardiovascular:  Negative for chest pain. Gastrointestinal:  Negative for abdominal pain, diarrhea, nausea and vomiting. Endocrine: Negative for cold intolerance and heat intolerance. Genitourinary:  Negative for dysuria and frequency. Musculoskeletal:  Positive for back pain. Neurological:  Negative for dizziness and light-headedness. Objective:   /70   Pulse 98   Temp 97.7 °F (36.5 °C)   Resp 18   Ht 5' 3\" (1.6 m)   Wt 214 lb (97.1 kg)   SpO2 95% Comment: on 4L oxygen  BMI 37.91 kg/m²   Pulse ox 84% on room air at rest. Increased to 95% on 4L O2. Physical Exam  Constitutional:       General: She is not in acute distress. Appearance: She is not diaphoretic. Cardiovascular:      Rate and Rhythm: Normal rate and regular rhythm. Pulses: Normal pulses. Heart sounds: Normal heart sounds, S1 normal and S2 normal.   Pulmonary:      Effort: Pulmonary effort is normal. No respiratory distress. Breath sounds: Normal breath sounds. No wheezing or rales. Chest:      Chest wall: No tenderness. Abdominal:      General: Bowel sounds are normal.      Tenderness: There is no abdominal tenderness. Neurological:      Mental Status: She is alert. Assessment:       Diagnosis Orders   1. Hypoxia Inadequately Controlled Mata Olmos MD, PulmonologyAbhi    non compliant with home O2      2. Chronic obstructive pulmonary disease, unspecified COPD type ([address] Inadequately Controlled Mata Olmos MD, PulmonologyAbhi    will follow with pulmo      3. Post-menopausal  DEXA BONE DENSITY AXIAL SKELETON      4. At high risk for falls      hx lumbar stenosis, follows with pain mx       5.  Type 2 diabetes mellitus without complication, without long-term current use of insulin (HCC) Improving POCT glycosylated hemoglobin (Hb A1C)    Microalbumin / Creatinine Urine Ratio    Lipid, Fasting      6. Tobacco abuse counseling  OR TOBACCO USE CESSATION INTERMEDIATE 3-10 MINUTES          Plan:      Orders Placed This Encounter   Procedures    DEXA BONE DENSITY AXIAL SKELETON     Standing Status:   Future     Standing Expiration Date:   8/23/2023    Microalbumin / Creatinine Urine Ratio     Standing Status:   Future     Number of Occurrences:   1     Standing Expiration Date:   8/23/2023    Lipid, Fasting     Standing Status:   Future     Standing Expiration Date:   8/23/2023    Víctor Rosa MD, Pulmonology, [address] [place] [address]     Referral Priority:   Routine     Referral Type:   Eval and Treat     Referral Reason:   Specialty Services Required     Requested Specialty:   Pulmonology     Number of Visits Requested:   1    POCT glycosylated hemoglobin (Hb A1C)    OR TOBACCO USE CESSATION INTERMEDIATE 3-10 MINUTES     No orders of the defined types were placed in this encounter. Return in about 1 month (around 9/23/2022) for follow up, with PCP. On the basis of positive falls risk screening, assessment and plan is as follows: in-office gait and balance testing performed using The Timed Up and Go Test was positive for increased falls risk. Will inquire about alternative DME company for home O2.

## 2022-08-24 ENCOUNTER — TELEPHONE (OUTPATIENT)
Dept: PRIMARY CARE CLINIC | Age: 67
End: 2022-08-24

## 2022-08-24 NOTE — TELEPHONE ENCOUNTER
Violeta from [employer] needs your note documenting pulse ox room air at rest. Can you please add this to your note? Her # is [phone]. This is for her oxygen we sent yesterday for her.

## 2022-08-28 DIAGNOSIS — E11.21 DIABETIC NEPHROPATHY ASSOCIATED WITH TYPE 2 DIABETES MELLITUS (HCC): Primary | ICD-10-CM

## 2022-08-28 RX ORDER — LISINOPRIL 10 MG/1
10 TABLET ORAL DAILY
Qty: 30 TABLET | Refills: 3 | Status: SHIPPED | OUTPATIENT
Start: 2022-08-28

## 2022-11-18 DIAGNOSIS — E11.9 TYPE 2 DIABETES MELLITUS WITHOUT COMPLICATION, WITHOUT LONG-TERM CURRENT USE OF INSULIN (HCC): ICD-10-CM

## 2022-11-18 RX ORDER — PEN NEEDLE, DIABETIC 32GX 5/32"
NEEDLE, DISPOSABLE MISCELLANEOUS
Qty: 200 EACH | Refills: 10 | Status: SHIPPED | OUTPATIENT
Start: 2022-11-18

## 2023-01-08 DIAGNOSIS — E11.21 DIABETIC NEPHROPATHY ASSOCIATED WITH TYPE 2 DIABETES MELLITUS (HCC): ICD-10-CM

## 2023-01-12 DIAGNOSIS — E11.9 TYPE 2 DIABETES MELLITUS WITHOUT COMPLICATION, WITHOUT LONG-TERM CURRENT USE OF INSULIN (HCC): Primary | ICD-10-CM

## 2023-01-12 RX ORDER — LISINOPRIL 10 MG/1
10 TABLET ORAL DAILY
Qty: 90 TABLET | Refills: 3 | Status: SHIPPED | OUTPATIENT
Start: 2023-01-12

## 2023-02-09 DIAGNOSIS — E11.9 TYPE 2 DIABETES MELLITUS WITHOUT COMPLICATION, WITHOUT LONG-TERM CURRENT USE OF INSULIN (HCC): ICD-10-CM

## 2023-05-25 ENCOUNTER — TELEPHONE (OUTPATIENT)
Dept: PRIMARY CARE CLINIC | Age: 68
End: 2023-05-25

## 2023-05-26 ENCOUNTER — TELEMEDICINE (OUTPATIENT)
Dept: PRIMARY CARE CLINIC | Age: 68
End: 2023-05-26
Payer: MEDICARE

## 2023-05-26 ENCOUNTER — TELEMEDICINE (OUTPATIENT)
Dept: PRIMARY CARE CLINIC | Age: 68
End: 2023-05-26

## 2023-05-26 DIAGNOSIS — J44.9 CHRONIC OBSTRUCTIVE PULMONARY DISEASE, UNSPECIFIED COPD TYPE (HCC): ICD-10-CM

## 2023-05-26 DIAGNOSIS — Z00.00 MEDICARE ANNUAL WELLNESS VISIT, SUBSEQUENT: Primary | ICD-10-CM

## 2023-05-26 DIAGNOSIS — Z87.891 PERSONAL HISTORY OF TOBACCO USE: ICD-10-CM

## 2023-05-26 DIAGNOSIS — E11.9 TYPE 2 DIABETES MELLITUS WITHOUT COMPLICATION, WITHOUT LONG-TERM CURRENT USE OF INSULIN (HCC): Primary | ICD-10-CM

## 2023-05-26 DIAGNOSIS — M54.9 CHRONIC BACK PAIN, UNSPECIFIED BACK LOCATION, UNSPECIFIED BACK PAIN LATERALITY: ICD-10-CM
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
## 2023-05-26 DIAGNOSIS — Z12.11 SPECIAL SCREENING FOR MALIGNANT NEOPLASMS, COLON: ICD-10-CM

## 2023-05-26 DIAGNOSIS — G89.29 CHRONIC BACK PAIN, UNSPECIFIED BACK LOCATION, UNSPECIFIED BACK PAIN LATERALITY: ICD-10-CM

## 2023-05-26 PROCEDURE — G0439 PPPS, SUBSEQ VISIT: HCPCS | Performed by: INTERNAL MEDICINE

## 2023-05-26 PROCEDURE — 1123F ACP DISCUSS/DSCN MKR DOCD: CPT | Performed by: INTERNAL MEDICINE

## 2023-05-26 RX ORDER — CYCLOBENZAPRINE HCL 5 MG
5 TABLET ORAL 2 TIMES DAILY PRN
Qty: 20 TABLET | Refills: 1 | Status: SHIPPED | OUTPATIENT
Start: 2023-05-26

## 2023-05-26 RX ORDER — FLUTICASONE FUROATE AND VILANTEROL 100; 25 UG/1; UG/1
1 POWDER RESPIRATORY (INHALATION) DAILY
Qty: 4 EACH | Refills: 5 | Status: SHIPPED | OUTPATIENT
Start: 2023-05-26

## 2023-05-26 SDOH — ECONOMIC STABILITY: FOOD INSECURITY: WITHIN THE PAST 12 MONTHS, THE FOOD YOU BOUGHT JUST DIDN'T LAST AND YOU DIDN'T HAVE MONEY TO GET MORE.: NEVER TRUE

## 2023-05-26 SDOH — ECONOMIC STABILITY: FOOD INSECURITY: WITHIN THE PAST 12 MONTHS, YOU WORRIED THAT YOUR FOOD WOULD RUN OUT BEFORE YOU GOT MONEY TO BUY MORE.: NEVER TRUE

## 2023-05-26 SDOH — ECONOMIC STABILITY: INCOME INSECURITY: HOW HARD IS IT FOR YOU TO PAY FOR THE VERY BASICS LIKE FOOD, HOUSING, MEDICAL CARE, AND HEATING?: NOT HARD AT ALL

## 2023-05-26 SDOH — ECONOMIC STABILITY: HOUSING INSECURITY
IN THE LAST 12 MONTHS, WAS THERE A TIME WHEN YOU DID NOT HAVE A STEADY PLACE TO SLEEP OR SLEPT IN A SHELTER (INCLUDING NOW)?: NO

## 2023-05-26 ASSESSMENT — PATIENT HEALTH QUESTIONNAIRE - PHQ9
10. IF YOU CHECKED OFF ANY PROBLEMS, HOW DIFFICULT HAVE THESE PROBLEMS MADE IT FOR YOU TO DO YOUR WORK, TAKE CARE OF THINGS AT HOME, OR GET ALONG WITH OTHER PEOPLE: 0
4. FEELING TIRED OR HAVING LITTLE ENERGY: 0
6. FEELING BAD ABOUT YOURSELF - OR THAT YOU ARE A FAILURE OR HAVE LET YOURSELF OR YOUR FAMILY DOWN: 0
3. TROUBLE FALLING OR STAYING ASLEEP: 0
7. TROUBLE CONCENTRATING ON THINGS, SUCH AS READING THE NEWSPAPER OR WATCHING TELEVISION: 0
8. MOVING OR SPEAKING SO SLOWLY THAT OTHER PEOPLE COULD HAVE NOTICED. OR THE OPPOSITE, BEING SO FIGETY OR RESTLESS THAT YOU HAVE BEEN MOVING AROUND A LOT MORE THAN USUAL: 0
2. FEELING DOWN, DEPRESSED OR HOPELESS: 0
SUM OF ALL RESPONSES TO PHQ QUESTIONS 1-9: 0
5. POOR APPETITE OR OVEREATING: 0
SUM OF ALL RESPONSES TO PHQ QUESTIONS 1-9: 0
9. THOUGHTS THAT YOU WOULD BE BETTER OFF DEAD, OR OF HURTING YOURSELF: 0
SUM OF ALL RESPONSES TO PHQ9 QUESTIONS 1 & 2: 0
SUM OF ALL RESPONSES TO PHQ QUESTIONS 1-9: 0
SUM OF ALL RESPONSES TO PHQ QUESTIONS 1-9: 0
1. LITTLE INTEREST OR PLEASURE IN DOING THINGS: 0

## 2023-05-26 ASSESSMENT — COLUMBIA-SUICIDE SEVERITY RATING SCALE - C-SSRS
1. WITHIN THE PAST MONTH, HAVE YOU WISHED YOU WERE DEAD OR WISHED YOU COULD GO TO SLEEP AND NOT WAKE UP?: NO
2. HAVE YOU ACTUALLY HAD ANY THOUGHTS OF KILLING YOURSELF?: NO
6. HAVE YOU EVER DONE ANYTHING, STARTED TO DO ANYTHING, OR PREPARED TO DO ANYTHING TO END YOUR LIFE?: NO

## 2023-05-26 NOTE — PROGRESS NOTES
Louis Salter is a 79 y.o. female evaluated via telephone on 5/26/2023 for Diabetes (Needs new dm supplies hasnt been checking sugars at home ), Check-Up (Check up, willing to do colonoscopy and lung ct ), and Pain (Requesting rx for tens unit )    Documentation:  I communicated with the patient and/or health care decision maker:   Details of this discussion including any medical advice provided:     Pt presents for follow up for T2DM. Remains compliant with Lantus and metformin. Not checking blood sugar. Other meds: lisinopril and Lipitor. Hemoglobin A1C (%)   Date Value   08/23/2022 6.8   08/11/2021 7.6   03/06/2020 9.9 (H)   02/18/2019 8   11/15/2017 9.3     COPD- no longer taking Breo. Uses albuterol  twice daily     Attests to 2 falls in the past year(last was 5 months ago). Landed on her hip. One was preceded by lightheadedness after taking flexeril. No med eval sought. No pain currently. Needs assistance with house keeping. Will refer to [facility] for aide. Will halve flexeril dose. Will reorder breo     Total Time: minutes: 11-20 minutes    Louis Salter was evaluated through a synchronous (real-time) audio encounter. Patient identification was verified at the start of the visit. She (or guardian if applicable) is aware that this is a billable service, which includes applicable co-pays. This visit was conducted with the patient's (and/or legal guardian's) verbal consent. She has not had a related appointment within my department in the past 7 days or scheduled within the next 24 hours. The patient was located at Home: [address] The provider was located at [facility] ([place] [address]): [address] [place] [address]  [address] [facility],  [address]     Note: not billable if this call serves to triage the patient into an appointment for the relevant concern    Kylah Hawk MD

## 2023-05-26 NOTE — PROGRESS NOTES
Jonathan Mcclure is a 79 y.o. female evaluated via telephone on  5/26/2023 for AW       Medicare Annual Wellness Visit    Jonathan Mcclure is here for Medicare AWV (Patient presents today for AWV)    Assessment & Plan   Medicare annual wellness visit, subsequent    Recommendations for Preventive Services Due: see orders and patient instructions/AVS.  Recommended screening schedule for the next 5-10 years is provided to the patient in written form: see Patient Instructions/AVS.     No follow-ups on file. Subjective   The following acute and/or chronic problems were also addressed today:  T2DM and COPD    Patient's complete Health Risk Assessment and screening values have been reviewed and are found in Flowsheets. The following problems were reviewed today and where indicated follow up appointments were made and/or referrals ordered. Positive Risk Factor Screenings with Interventions:    Fall Risk:  Do you feel unsteady or are you worried about falling? : no  2 or more falls in past year?: (!) yes  Fall with injury in past year?: no     Interventions:    Patient comments: felt lightheaded before falling. Takes lisinopril, oxycodone and flexeril. Unsure of BG at the time. Thinks it's Flexeril 10mg               Opioid Risk: (Low risk score <55) Opioid risk score: 22    Patient is low risk for opioid use disorder or overdose. Last PDMP Marie Forbes as Reviewed:  Review User Review Instant Review Result              Last Controlled Substance Monitoring Documentation      [address] Office Visit from 1/17/2018 in [facility] PCP   Attestation The Prescription Monitoring Report for this patient was reviewed today. filed at 01/17/2018 1016   Periodic Controlled Substance Monitoring Possible medication side effects, risk of tolerance and/or dependence, and alternative treatments discussed., No signs of potential drug abuse or diversion identified.  filed at 01/17/2018 1016             Social and Emotional Support:  Do you

## 2023-05-30 ENCOUNTER — TELEPHONE (OUTPATIENT)
Dept: PRIMARY CARE CLINIC | Age: 68
End: 2023-05-30

## 2023-05-31 ENCOUNTER — TELEPHONE (OUTPATIENT)
Dept: PRIMARY CARE CLINIC | Age: 68
End: 2023-05-31

## 2023-06-03 DIAGNOSIS — K08.89 TOOTHACHE: Primary | ICD-10-CM

## 2023-06-03 RX ORDER — CLINDAMYCIN HYDROCHLORIDE 300 MG/1
300 CAPSULE ORAL 4 TIMES DAILY
Qty: 28 CAPSULE | Refills: 0 | Status: SHIPPED | OUTPATIENT
Start: 2023-06-03 | End: 2023-06-10

## 2023-06-05 ENCOUNTER — TELEPHONE (OUTPATIENT)
Dept: PRIMARY CARE CLINIC | Age: 68
End: 2023-06-05

## 2023-06-05 DIAGNOSIS — E11.9 TYPE 2 DIABETES MELLITUS WITHOUT COMPLICATION, WITHOUT LONG-TERM CURRENT USE OF INSULIN (HCC): ICD-10-CM

## 2023-06-05 DIAGNOSIS — Z12.11 SPECIAL SCREENING FOR MALIGNANT NEOPLASMS, COLON: Primary | ICD-10-CM

## 2023-06-05 RX ORDER — SEMAGLUTIDE 0.68 MG/ML
INJECTION, SOLUTION SUBCUTANEOUS
Qty: 3 ML | Refills: 5 | Status: SHIPPED | OUTPATIENT
Start: 2023-06-05

## 2023-06-05 NOTE — TELEPHONE ENCOUNTER
Pt says insurance not covered 100% through [address] [facility] for colonoscopy and CT lungs. Can go to [employer] in [facility].  Please sent new order to [facility]

## 2023-06-05 NOTE — TELEPHONE ENCOUNTER
Pt says that [facility] checked A1C which was 6.8. Pt inquiring about Trulicity. Wants to know if this is something she can try to help with diabetes and weight loss?

## 2023-06-27 DIAGNOSIS — E11.9 TYPE 2 DIABETES MELLITUS WITHOUT COMPLICATION, WITHOUT LONG-TERM CURRENT USE OF INSULIN (HCC): ICD-10-CM

## 2023-06-27 RX ORDER — BLOOD SUGAR DIAGNOSTIC
STRIP MISCELLANEOUS
Qty: 200 STRIP | Refills: 10 | Status: SHIPPED | OUTPATIENT
Start: 2023-06-27

## 2023-09-15 DIAGNOSIS — E78.5 HYPERLIPIDEMIA, UNSPECIFIED HYPERLIPIDEMIA TYPE: ICD-10-CM

## 2023-09-15 RX ORDER — SIMVASTATIN 40 MG
TABLET ORAL
Qty: 90 TABLET | Refills: 3 | Status: SHIPPED | OUTPATIENT
Start: 2023-09-15

## 2023-09-26 DIAGNOSIS — J44.1 COPD WITH ACUTE EXACERBATION (HCC): ICD-10-CM

## 2023-09-27 DIAGNOSIS — J44.1 COPD WITH ACUTE EXACERBATION (HCC): ICD-10-CM

## 2023-09-27 RX ORDER — IPRATROPIUM BROMIDE AND ALBUTEROL SULFATE 2.5; .5 MG/3ML; MG/3ML
SOLUTION RESPIRATORY (INHALATION)
Qty: 360 ML | Refills: 3 | Status: SHIPPED | OUTPATIENT
Start: 2023-09-27

## 2023-09-27 RX ORDER — IPRATROPIUM BROMIDE AND ALBUTEROL SULFATE 2.5; .5 MG/3ML; MG/3ML
SOLUTION RESPIRATORY (INHALATION)
OUTPATIENT
Start: 2023-09-27

## 2023-09-27 RX ORDER — ALBUTEROL SULFATE 90 UG/1
2 AEROSOL, METERED RESPIRATORY (INHALATION) EVERY 6 HOURS PRN
Qty: 6.7 G | Refills: 3 | Status: SHIPPED | OUTPATIENT
Start: 2023-09-27

## 2023-09-29 DIAGNOSIS — E11.9 TYPE 2 DIABETES MELLITUS WITHOUT COMPLICATION, WITHOUT LONG-TERM CURRENT USE OF INSULIN (HCC): ICD-10-CM

## 2023-09-29 RX ORDER — INSULIN GLARGINE 100 [IU]/ML
INJECTION, SOLUTION SUBCUTANEOUS
Qty: 75 ML | Refills: 10 | Status: SHIPPED | OUTPATIENT
Start: 2023-09-29

## 2023-10-17 RX ORDER — BLOOD SUGAR DIAGNOSTIC
STRIP MISCELLANEOUS
COMMUNITY
Start: 2022-12-30

## 2023-10-17 RX ORDER — METFORMIN HYDROCHLORIDE 1000 MG/1
TABLET ORAL 2 TIMES DAILY
COMMUNITY
End: 2024-05-28 | Stop reason: SDUPTHER

## 2023-10-17 RX ORDER — LISINOPRIL 5 MG/1
1 TABLET ORAL DAILY
COMMUNITY
End: 2023-10-24 | Stop reason: WASHOUT

## 2023-10-17 RX ORDER — GLIMEPIRIDE 2 MG/1
TABLET ORAL 2 TIMES DAILY
COMMUNITY
End: 2023-10-24 | Stop reason: WASHOUT

## 2023-10-17 RX ORDER — GABAPENTIN 400 MG/1
CAPSULE ORAL 2 TIMES DAILY
COMMUNITY
End: 2023-10-24 | Stop reason: WASHOUT

## 2023-10-17 RX ORDER — ATORVASTATIN CALCIUM 10 MG/1
1 TABLET, FILM COATED ORAL DAILY
COMMUNITY
End: 2024-05-09 | Stop reason: WASHOUT

## 2023-10-17 RX ORDER — ALBUTEROL SULFATE 90 UG/1
AEROSOL, METERED RESPIRATORY (INHALATION)
COMMUNITY
Start: 2023-01-10

## 2023-10-17 RX ORDER — CELECOXIB 200 MG/1
CAPSULE ORAL 2 TIMES DAILY
COMMUNITY
End: 2023-10-24 | Stop reason: WASHOUT

## 2023-10-17 RX ORDER — INSULIN GLARGINE 100 [IU]/ML
INJECTION, SOLUTION SUBCUTANEOUS
COMMUNITY
Start: 2023-01-02 | End: 2024-05-28 | Stop reason: SDUPTHER

## 2023-10-17 RX ORDER — IPRATROPIUM BROMIDE AND ALBUTEROL SULFATE 2.5; .5 MG/3ML; MG/3ML
SOLUTION RESPIRATORY (INHALATION) EVERY 6 HOURS PRN
COMMUNITY

## 2023-10-22 NOTE — PROGRESS NOTES
"Subjective   Patient ID: Estrella Melton is an 68 y.o. female who is presenting today for a New Patient Visit.      The patients living will is non-active. Her POA is son Andi, back-up POA is Unknown at this time.  The patients current code status is FULL CODE. The patient does not want to linger on machines.  This information was reviewed as she is new but she had her annual wellness exam in May with previous provider.    Hyperlipidemia: The patient is present today for a follow up of hyperlipidemia. She denies having any leg cramping in particular. She is trying to follow a low-fat diet.     Diabetes Mellitus: The patient presents today for a follow up of DM. Patient denies any side effects to the medications.     COPD: The patient presents for a follow up of COPD.     Obesity: The patient is present for a follow up for obesity. The patient is continuously working on diet and exercise. The patient will continue working on strategies to maintain weight loss and stay well hydrated.     Pt was offered the flu shot but declined    Review of Systems   Constitutional:  Negative for chills and fatigue.   HENT:  Negative for congestion, ear pain, hearing loss, rhinorrhea, sinus pressure and sinus pain.    Eyes:  Negative for pain and visual disturbance.   Respiratory:  Negative for chest tightness and shortness of breath.    Cardiovascular:  Negative for chest pain and palpitations.   Gastrointestinal:  Negative for abdominal pain, blood in stool, constipation, diarrhea, nausea and vomiting.   Genitourinary:  Negative for frequency and urgency.   Musculoskeletal:  Negative for joint swelling, neck pain and neck stiffness.   Neurological:  Negative for headaches.   Psychiatric/Behavioral:  Negative for sleep disturbance.          Objective   Vitals:  /80   Pulse 60   Temp 36.2 °C (97.2 °F)   Resp 16   Ht 1.6 m (5' 3\")   Wt 95.7 kg (211 lb)   SpO2 (!) 86%   BMI 37.38 kg/m²       Physical Exam  Vitals reviewed. "   Constitutional:       Appearance: Normal appearance. She is obese.   Neck:      Vascular: No carotid bruit.   Cardiovascular:      Rate and Rhythm: Normal rate and regular rhythm.      Pulses: Normal pulses.      Heart sounds: Normal heart sounds.   Pulmonary:      Effort: Pulmonary effort is normal. No respiratory distress.      Breath sounds: Normal breath sounds. No wheezing.   Abdominal:      General: There is no distension.      Palpations: Abdomen is soft. There is no mass.      Tenderness: There is no abdominal tenderness. There is no right CVA tenderness, left CVA tenderness, guarding or rebound.   Musculoskeletal:      Cervical back: Normal range of motion and neck supple. No rigidity.      Right lower leg: No edema.      Left lower leg: No edema.   Lymphadenopathy:      Cervical: No cervical adenopathy.   Neurological:      Mental Status: She is alert.     Skin shows large keratotic lesion on the left forearm.    Labs active- future.       Assessment/Plan   Problem List Items Addressed This Visit       Type 2 diabetes mellitus without complication, without long-term current use of insulin (CMS/[facility])    Current Assessment & Plan     Is clinically stable so we will continue with current medications and lab work to confirm status ordered.          Relevant Orders    Hemoglobin A1C    Albumin , Urine Random    Hyperlipidemia - Primary    Relevant Orders    CBC and Auto Differential    Lipid Panel    Comprehensive Metabolic Panel    Chronic obstructive lung disease (CMS/[facility])    Current Assessment & Plan      Is stable, continue with current treatment.         Class 2 severe obesity due to excess calories with serious comorbidity and body mass index (BMI) of 37.0 to 37.9 in adult (CMS/[facility])    Current Assessment & Plan      This condition is poorly controlled, therapeutic changes necessary. The patient is continuously working on diet and exercise. The patient will continue working on strategies to maintain  weight loss and stay well hydrated.             Other Visit Diagnoses       Encounter for screening for malignant neoplasm of colon        Relevant Orders    Colonoscopy Screening; High Risk Patient; colon cancer in family          This patient will follow-up with me in February with lab work ahead of time. Other follow-up will be as needed.    Scribe Attestation  By signing my name below, I, Keshav Nunes   attest that this documentation has been prepared under the direction and in the presence of Jonathan Charles MD.

## 2023-10-24 ENCOUNTER — LAB (OUTPATIENT)
Dept: LAB | Facility: LAB | Age: 68
End: 2023-10-24
Payer: MEDICARE

## 2023-10-24 ENCOUNTER — OFFICE VISIT (OUTPATIENT)
Dept: PRIMARY CARE | Facility: CLINIC | Age: 68
End: 2023-10-24
Payer: MEDICARE

## 2023-10-24 VITALS
SYSTOLIC BLOOD PRESSURE: 126 MMHG | WEIGHT: 211 LBS | DIASTOLIC BLOOD PRESSURE: 80 MMHG | HEIGHT: 63 IN | TEMPERATURE: 97.2 F | HEART RATE: 60 BPM | BODY MASS INDEX: 37.39 KG/M2 | RESPIRATION RATE: 16 BRPM | OXYGEN SATURATION: 86 %

## 2023-10-24 DIAGNOSIS — E78.2 MIXED HYPERLIPIDEMIA: Primary | ICD-10-CM

## 2023-10-24 DIAGNOSIS — E78.2 MIXED HYPERLIPIDEMIA: ICD-10-CM

## 2023-10-24 DIAGNOSIS — E11.9 TYPE 2 DIABETES MELLITUS WITHOUT COMPLICATION, WITHOUT LONG-TERM CURRENT USE OF INSULIN (MULTI): ICD-10-CM

## 2023-10-24 DIAGNOSIS — J43.1 PANLOBULAR EMPHYSEMA (MULTI): ICD-10-CM

## 2023-10-24 DIAGNOSIS — Z12.11 ENCOUNTER FOR SCREENING FOR MALIGNANT NEOPLASM OF COLON: ICD-10-CM

## 2023-10-24 DIAGNOSIS — E66.01 CLASS 2 SEVERE OBESITY DUE TO EXCESS CALORIES WITH SERIOUS COMORBIDITY AND BODY MASS INDEX (BMI) OF 37.0 TO 37.9 IN ADULT (MULTI): ICD-10-CM

## 2023-10-24 PROBLEM — J44.9 CHRONIC OBSTRUCTIVE LUNG DISEASE (MULTI): Status: ACTIVE | Noted: 2023-10-24

## 2023-10-24 PROBLEM — D12.6 ADENOMATOUS POLYP OF COLON: Status: ACTIVE | Noted: 2023-10-24

## 2023-10-24 PROBLEM — Z00.00 ENCOUNTER FOR SUBSEQUENT ANNUAL WELLNESS VISIT (AWV) IN MEDICARE PATIENT: Status: ACTIVE | Noted: 2023-10-24

## 2023-10-24 PROBLEM — E66.812 CLASS 2 SEVERE OBESITY DUE TO EXCESS CALORIES WITH SERIOUS COMORBIDITY AND BODY MASS INDEX (BMI) OF 37.0 TO 37.9 IN ADULT: Status: ACTIVE | Noted: 2023-10-24

## 2023-10-24 PROBLEM — R00.2 HEART PALPITATIONS: Status: ACTIVE | Noted: 2017-10-12

## 2023-10-24 LAB
ALBUMIN SERPL BCP-MCNC: 4.3 G/DL (ref 3.4–5)
ALP SERPL-CCNC: 48 U/L (ref 33–136)
ALT SERPL W P-5'-P-CCNC: 17 U/L (ref 7–45)
ANION GAP SERPL CALC-SCNC: 14 MMOL/L (ref 10–20)
AST SERPL W P-5'-P-CCNC: 21 U/L (ref 9–39)
BASOPHILS # BLD AUTO: 0.08 X10*3/UL (ref 0–0.1)
BASOPHILS NFR BLD AUTO: 0.8 %
BILIRUB SERPL-MCNC: 0.4 MG/DL (ref 0–1.2)
BUN SERPL-MCNC: 10 MG/DL (ref 6–23)
CALCIUM SERPL-MCNC: 9.7 MG/DL (ref 8.6–10.3)
CHLORIDE SERPL-SCNC: 94 MMOL/L (ref 98–107)
CHOLEST SERPL-MCNC: 187 MG/DL (ref 0–199)
CHOLESTEROL/HDL RATIO: 3.7
CO2 SERPL-SCNC: 35 MMOL/L (ref 21–32)
CREAT SERPL-MCNC: 0.72 MG/DL (ref 0.5–1.05)
CREAT UR-MCNC: 94.7 MG/DL (ref 20–320)
EOSINOPHIL # BLD AUTO: 0.26 X10*3/UL (ref 0–0.7)
EOSINOPHIL NFR BLD AUTO: 2.5 %
ERYTHROCYTE [DISTWIDTH] IN BLOOD BY AUTOMATED COUNT: 14.4 % (ref 11.5–14.5)
EST. AVERAGE GLUCOSE BLD GHB EST-MCNC: 148 MG/DL
GFR SERPL CREATININE-BSD FRML MDRD: >90 ML/MIN/1.73M*2
GLUCOSE SERPL-MCNC: 82 MG/DL (ref 74–99)
HBA1C MFR BLD: 6.8 %
HCT VFR BLD AUTO: 53.3 % (ref 36–46)
HDLC SERPL-MCNC: 51 MG/DL
HGB BLD-MCNC: 17 G/DL (ref 12–16)
IMM GRANULOCYTES # BLD AUTO: 0.07 X10*3/UL (ref 0–0.7)
IMM GRANULOCYTES NFR BLD AUTO: 0.7 % (ref 0–0.9)
LDLC SERPL CALC-MCNC: 101 MG/DL
LYMPHOCYTES # BLD AUTO: 3.41 X10*3/UL (ref 1.2–4.8)
LYMPHOCYTES NFR BLD AUTO: 33 %
MCH RBC QN AUTO: 33.3 PG (ref 26–34)
MCHC RBC AUTO-ENTMCNC: 31.9 G/DL (ref 32–36)
MCV RBC AUTO: 105 FL (ref 80–100)
MICROALBUMIN UR-MCNC: 7.1 MG/L
MICROALBUMIN/CREAT UR: 7.5 UG/MG CREAT
MONOCYTES # BLD AUTO: 0.64 X10*3/UL (ref 0.1–1)
MONOCYTES NFR BLD AUTO: 6.2 %
NEUTROPHILS # BLD AUTO: 5.86 X10*3/UL (ref 1.2–7.7)
NEUTROPHILS NFR BLD AUTO: 56.8 %
NON HDL CHOLESTEROL: 136 MG/DL (ref 0–149)
NRBC BLD-RTO: 0 /100 WBCS (ref 0–0)
PLATELET # BLD AUTO: 246 X10*3/UL (ref 150–450)
PMV BLD AUTO: 10.3 FL (ref 7.5–11.5)
POTASSIUM SERPL-SCNC: 5 MMOL/L (ref 3.5–5.3)
PROT SERPL-MCNC: 7.4 G/DL (ref 6.4–8.2)
RBC # BLD AUTO: 5.1 X10*6/UL (ref 4–5.2)
SODIUM SERPL-SCNC: 138 MMOL/L (ref 136–145)
TRIGL SERPL-MCNC: 175 MG/DL (ref 0–149)
VLDL: 35 MG/DL (ref 0–40)
WBC # BLD AUTO: 10.3 X10*3/UL (ref 4.4–11.3)

## 2023-10-24 PROCEDURE — 3079F DIAST BP 80-89 MM HG: CPT | Performed by: FAMILY MEDICINE

## 2023-10-24 PROCEDURE — 85025 COMPLETE CBC W/AUTO DIFF WBC: CPT

## 2023-10-24 PROCEDURE — 3074F SYST BP LT 130 MM HG: CPT | Performed by: FAMILY MEDICINE

## 2023-10-24 PROCEDURE — 1160F RVW MEDS BY RX/DR IN RCRD: CPT | Performed by: FAMILY MEDICINE

## 2023-10-24 PROCEDURE — 82043 UR ALBUMIN QUANTITATIVE: CPT

## 2023-10-24 PROCEDURE — 99204 OFFICE O/P NEW MOD 45 MIN: CPT | Performed by: FAMILY MEDICINE

## 2023-10-24 PROCEDURE — 82570 ASSAY OF URINE CREATININE: CPT

## 2023-10-24 PROCEDURE — 36415 COLL VENOUS BLD VENIPUNCTURE: CPT

## 2023-10-24 PROCEDURE — 80053 COMPREHEN METABOLIC PANEL: CPT

## 2023-10-24 PROCEDURE — 80061 LIPID PANEL: CPT

## 2023-10-24 PROCEDURE — 1159F MED LIST DOCD IN RCRD: CPT | Performed by: FAMILY MEDICINE

## 2023-10-24 PROCEDURE — 3008F BODY MASS INDEX DOCD: CPT | Performed by: FAMILY MEDICINE

## 2023-10-24 PROCEDURE — 4010F ACE/ARB THERAPY RXD/TAKEN: CPT | Performed by: FAMILY MEDICINE

## 2023-10-24 PROCEDURE — 83036 HEMOGLOBIN GLYCOSYLATED A1C: CPT

## 2023-10-24 PROCEDURE — 1170F FXNL STATUS ASSESSED: CPT | Performed by: FAMILY MEDICINE

## 2023-10-24 RX ORDER — SIMVASTATIN 40 MG/1
1 TABLET, FILM COATED ORAL NIGHTLY
COMMUNITY
Start: 2023-09-15 | End: 2024-05-28 | Stop reason: SDUPTHER

## 2023-10-24 RX ORDER — CYCLOBENZAPRINE HCL 10 MG
10 TABLET ORAL
COMMUNITY
Start: 2023-09-28

## 2023-10-24 RX ORDER — LISINOPRIL 10 MG/1
10 TABLET ORAL DAILY
COMMUNITY
Start: 2023-09-15 | End: 2024-05-28 | Stop reason: SDUPTHER

## 2023-10-24 RX ORDER — SEMAGLUTIDE 0.68 MG/ML
INJECTION, SOLUTION SUBCUTANEOUS
COMMUNITY
Start: 2023-06-05 | End: 2024-05-09 | Stop reason: WASHOUT

## 2023-10-24 RX ORDER — FLUTICASONE FUROATE AND VILANTEROL TRIFENATATE 100; 25 UG/1; UG/1
1 POWDER RESPIRATORY (INHALATION) DAILY
COMMUNITY

## 2023-10-24 RX ORDER — OXYCODONE AND ACETAMINOPHEN 7.5; 325 MG/1; MG/1
0.5 TABLET ORAL
COMMUNITY

## 2023-10-24 ASSESSMENT — ENCOUNTER SYMPTOMS
OCCASIONAL FEELINGS OF UNSTEADINESS: 0
SINUS PRESSURE: 0
SINUS PAIN: 0
CHEST TIGHTNESS: 0
SHORTNESS OF BREATH: 0
NAUSEA: 0
NECK PAIN: 0
BLOOD IN STOOL: 0
VOMITING: 0
LOSS OF SENSATION IN FEET: 0
CONSTIPATION: 0
RHINORRHEA: 0
ABDOMINAL PAIN: 0
FATIGUE: 0
NECK STIFFNESS: 0
DIARRHEA: 0
DEPRESSION: 0
CHILLS: 0
PALPITATIONS: 0
JOINT SWELLING: 0
HEADACHES: 0
SLEEP DISTURBANCE: 0
EYE PAIN: 0
FREQUENCY: 0

## 2023-10-24 ASSESSMENT — ACTIVITIES OF DAILY LIVING (ADL)
DOING_HOUSEWORK: INDEPENDENT
DRESSING: INDEPENDENT
GROCERY_SHOPPING: INDEPENDENT
TAKING_MEDICATION: INDEPENDENT
MANAGING_FINANCES: INDEPENDENT
BATHING: INDEPENDENT

## 2023-10-24 ASSESSMENT — PATIENT HEALTH QUESTIONNAIRE - PHQ9
2. FEELING DOWN, DEPRESSED OR HOPELESS: NOT AT ALL
SUM OF ALL RESPONSES TO PHQ9 QUESTIONS 1 AND 2: 0
1. LITTLE INTEREST OR PLEASURE IN DOING THINGS: NOT AT ALL

## 2023-10-24 NOTE — ASSESSMENT & PLAN NOTE
After Visit Summary   5/15/2017    Trish Wu    MRN: [id number]           Patient Information     Date Of Birth          1943        Visit Information        Provider Department      5/15/2017 1:40 PM Kavin Eddy MD [facility]        Today's Diagnoses     DDD (degenerative disc disease), lumbar    -  1    Osteopenia        Degenerative tear of acetabular labrum of left hip           Follow-ups after your visit        Additional Services     [facility], HAND, AND CHIROPRACTIC REFERRAL       **This order will print in the [facility] Scheduling Office**    Physical Therapy, Hand Therapy and Chiropractic Care are available through:    *[facility]  *[facility]  *[facility]    Call one number to schedule at any of the above locations: [phone].    Your provider has referred you to: Physical Therapy at [facility] or [facility]    Indication/Reason for Referral:    DDD (degenerative disc disease), lumbar  Degenerative tear of acetabular labrum of left hip    Onset of Illness:   Therapy Orders: Evaluate and Treat  Special Programs: None  Special Request: None    Dav Demarco      Additional Comments for the Therapist or Chiropractor:     Please be aware that coverage of these services is subject to the terms and limitations of your health insurance plan.  Call member services at your health plan with any benefit or coverage questions.      Please bring the following to your appointment:    *Your personal calendar for scheduling future appointments  *Comfortable clothing                  Follow-up notes from your care team     Return if symptoms worsen or fail to improve.      Your next 10 appointments already scheduled     May 22, 2017 10:00 AM CDT   [facility] Spine with Ta Bullock PT   [facility] ([facility])    [address] [place] [address]  [address]  [place]   Is stable, continue with current treatment.   "[place] [address]   [phone]              Who to contact     If you have questions or need follow up information about today's clinic visit or your schedule please contact [facility] [facility] directly at [phone].  Normal or non-critical lab and imaging results will be communicated to you by MyChart, letter or phone within 4 business days after the clinic has received the results. If you do not hear from us within 7 days, please contact the clinic through [facility] or phone. If you have a critical or abnormal lab result, we will notify you by phone as soon as possible.  Submit refill requests through [employer] or call your pharmacy and they will forward the refill request to us. Please allow 3 business days for your refill to be completed.          Additional Information About Your Visit        MyChart Information     [facility] gives you secure access to your electronic health record. If you see a primary care provider, you can also send messages to your care team and make appointments. If you have questions, please call your primary care clinic.  If you do not have a primary care provider, please call [phone] and they will assist you.        Care EveryWhere ID     This is your Care EveryWhere ID. This could be used by other organizations to access your [facility] medical records  [id number]        Your Vitals Were     Height BMI (Body Mass Index)                5' 6\" (1.676 m) 22.6 kg/m2           Blood Pressure from Last 3 Encounters:   05/15/17 118/64   04/28/17 138/86   04/20/17 122/78    Weight from Last 3 Encounters:   05/15/17 140 lb (63.5 kg)   04/28/17 142 lb 13.6 oz (64.8 kg)   04/20/17 140 lb (63.5 kg)              We Performed the Following     [facility], AND CHIR[facility]RACTIC REFERRAL          Today's Medication Changes          These changes are accurate as of: 5/15/17  8:32 PM.  If you have any questions, ask your nurse or doctor.               Start " taking these medicines.        Dose/Directions    HYDROcodone-acetaminophen 5-325 MG per tablet   Commonly known as:  NORCO   Used for:  DDD (degenerative disc disease), lumbar, Degenerative tear of acetabular labrum of left hip   Started by:  Kavin Eddy MD        Take 1-2 tabs every 6 hours as needed for low back pain   Quantity:  30 tablet   Refills:  0            Where to get your medicines      Some of these will need a paper prescription and others can be bought over the counter.  Ask your nurse if you have questions.     Bring a paper prescription for each of these medications     HYDROcodone-acetaminophen 5-325 MG per tablet                Primary Care Provider Office Phone # Fax #    Jermain Arcos [phone]       [facility] [address]  [place] [address]        Thank you!     Thank you for choosing [facility]  for your care. Our goal is always to provide you with excellent care. Hearing back from our patients is one way we can continue to improve our services. Please take a few minutes to complete the written survey that you may receive in the mail after your visit with us. Thank you!             Your Updated Medication List - Protect others around you: Learn how to safely use, store and throw away your medicines at www.disposemymeds.org.          This list is accurate as of: 5/15/17  8:32 PM.  Always use your most recent med list.                   Brand Name Dispense Instructions for use    cetirizine 10 MG tablet    zyrTEC     Take 1 tablet (10 mg) by mouth every evening       HYDROcodone-acetaminophen 5-325 MG per tablet    NORCO    30 tablet    Take 1-2 tabs every 6 hours as needed for low back pain       lisinopril-hydrochlorothiazide 20-25 MG per tablet    PRINZIDE/ZESTORETIC    90 tablet    Take 1 tablet by mouth daily       montelukast 10 MG tablet    SINGULAIR    90 tablet    Take 1  tablet (10 mg) by mouth At Bedtime       NASONEX 50 MCG/ACT spray   Generic drug:  mometasone      Spray 2 sprays into both nostrils daily

## 2023-10-26 DIAGNOSIS — Z12.11 ENCOUNTER FOR SCREENING COLONOSCOPY: Primary | ICD-10-CM

## 2023-10-26 RX ORDER — POLYETHYLENE GLYCOL 3350, SODIUM CHLORIDE, SODIUM BICARBONATE, POTASSIUM CHLORIDE 420; 11.2; 5.72; 1.48 G/4L; G/4L; G/4L; G/4L
4000 POWDER, FOR SOLUTION ORAL ONCE
Qty: 4000 ML | Refills: 0 | Status: SHIPPED | OUTPATIENT
Start: 2023-10-26 | End: 2023-10-26

## 2023-11-14 DIAGNOSIS — E11.9 TYPE 2 DIABETES MELLITUS WITHOUT COMPLICATION, WITHOUT LONG-TERM CURRENT USE OF INSULIN (HCC): ICD-10-CM

## 2023-12-11 ENCOUNTER — TELEPHONE (OUTPATIENT)
Dept: GASTROENTEROLOGY | Facility: CLINIC | Age: 68
End: 2023-12-11
Payer: MEDICARE

## 2023-12-11 NOTE — TELEPHONE ENCOUNTER
Attempted to speak with patient in regards to rescheduling procedure. VM left for patient to return call to office at earliest convenience.

## 2023-12-13 DIAGNOSIS — E11.21 DIABETIC NEPHROPATHY ASSOCIATED WITH TYPE 2 DIABETES MELLITUS (HCC): ICD-10-CM

## 2023-12-14 RX ORDER — LISINOPRIL 10 MG/1
10 TABLET ORAL DAILY
Qty: 90 TABLET | Refills: 3 | Status: SHIPPED | OUTPATIENT
Start: 2023-12-14

## 2023-12-18 ENCOUNTER — APPOINTMENT (OUTPATIENT)
Dept: GASTROENTEROLOGY | Facility: HOSPITAL | Age: 68
End: 2023-12-18
Payer: MEDICARE

## 2024-01-05 DIAGNOSIS — J44.1 COPD WITH ACUTE EXACERBATION (HCC): ICD-10-CM

## 2024-01-05 RX ORDER — ALBUTEROL SULFATE 90 UG/1
2 AEROSOL, METERED RESPIRATORY (INHALATION) EVERY 6 HOURS PRN
Qty: 6.7 G | Refills: 3 | Status: SHIPPED | OUTPATIENT
Start: 2024-01-05

## 2024-01-23 DIAGNOSIS — E11.9 TYPE 2 DIABETES MELLITUS WITHOUT COMPLICATION, WITHOUT LONG-TERM CURRENT USE OF INSULIN (HCC): ICD-10-CM

## 2024-01-23 RX ORDER — SEMAGLUTIDE 0.68 MG/ML
INJECTION, SOLUTION SUBCUTANEOUS
Qty: 3 ML | Refills: 5 | Status: SHIPPED | OUTPATIENT
Start: 2024-01-23

## 2024-01-27 DIAGNOSIS — J44.1 COPD WITH ACUTE EXACERBATION (HCC): ICD-10-CM

## 2024-01-31 RX ORDER — IPRATROPIUM BROMIDE AND ALBUTEROL SULFATE 2.5; .5 MG/3ML; MG/3ML
SOLUTION RESPIRATORY (INHALATION)
Qty: 360 ML | Refills: 3 | Status: SHIPPED | OUTPATIENT
Start: 2024-01-31

## 2024-02-05 ENCOUNTER — APPOINTMENT (OUTPATIENT)
Dept: GASTROENTEROLOGY | Facility: HOSPITAL | Age: 69
End: 2024-02-05
Payer: MEDICARE

## 2024-03-06 ENCOUNTER — TELEPHONE (OUTPATIENT)
Dept: PRIMARY CARE CLINIC | Age: 69
End: 2024-03-06

## 2024-04-08 ENCOUNTER — HOSPITAL ENCOUNTER (OUTPATIENT)
Dept: RADIOLOGY | Facility: HOSPITAL | Age: 69
Discharge: HOME | End: 2024-04-08
Payer: MEDICARE

## 2024-04-08 DIAGNOSIS — M48.061 LUMBAR STENOSIS: ICD-10-CM

## 2024-04-08 PROCEDURE — 72148 MRI LUMBAR SPINE W/O DYE: CPT

## 2024-04-08 PROCEDURE — 72148 MRI LUMBAR SPINE W/O DYE: CPT | Performed by: RADIOLOGY

## 2024-05-08 ENCOUNTER — HOSPITAL ENCOUNTER (OUTPATIENT)
Dept: RADIOLOGY | Facility: HOSPITAL | Age: 69
Discharge: HOME | End: 2024-05-08
Payer: MEDICARE

## 2024-05-08 DIAGNOSIS — M25.511 PAIN IN RIGHT SHOULDER: ICD-10-CM

## 2024-05-08 PROCEDURE — 72050 X-RAY EXAM NECK SPINE 4/5VWS: CPT | Performed by: INTERNAL MEDICINE

## 2024-05-08 PROCEDURE — 73030 X-RAY EXAM OF SHOULDER: CPT | Mod: RIGHT SIDE | Performed by: INTERNAL MEDICINE

## 2024-05-08 PROCEDURE — 73030 X-RAY EXAM OF SHOULDER: CPT | Mod: RT

## 2024-05-08 PROCEDURE — 72050 X-RAY EXAM NECK SPINE 4/5VWS: CPT

## 2024-05-09 DIAGNOSIS — J44.9 CHRONIC OBSTRUCTIVE PULMONARY DISEASE, UNSPECIFIED COPD TYPE (HCC): ICD-10-CM

## 2024-05-09 RX ORDER — FLUTICASONE FUROATE AND VILANTEROL TRIFENATATE 100; 25 UG/1; UG/1
1 POWDER RESPIRATORY (INHALATION) DAILY
Qty: 60 EACH | Refills: 1 | Status: SHIPPED | OUTPATIENT
Start: 2024-05-09

## 2024-05-13 ENCOUNTER — ANESTHESIA (OUTPATIENT)
Dept: GASTROENTEROLOGY | Facility: HOSPITAL | Age: 69
End: 2024-05-13
Payer: MEDICARE

## 2024-05-13 ENCOUNTER — HOSPITAL ENCOUNTER (OUTPATIENT)
Dept: GASTROENTEROLOGY | Facility: HOSPITAL | Age: 69
Setting detail: OUTPATIENT SURGERY
Discharge: HOME | End: 2024-05-13
Payer: MEDICARE

## 2024-05-13 ENCOUNTER — ANESTHESIA EVENT (OUTPATIENT)
Dept: GASTROENTEROLOGY | Facility: HOSPITAL | Age: 69
End: 2024-05-13
Payer: MEDICARE

## 2024-05-13 VITALS
TEMPERATURE: 97.2 F | HEART RATE: 96 BPM | OXYGEN SATURATION: 92 % | DIASTOLIC BLOOD PRESSURE: 66 MMHG | HEIGHT: 62 IN | SYSTOLIC BLOOD PRESSURE: 138 MMHG | RESPIRATION RATE: 18 BRPM | BODY MASS INDEX: 37.32 KG/M2 | WEIGHT: 202.82 LBS

## 2024-05-13 DIAGNOSIS — Z12.11 ENCOUNTER FOR SCREENING FOR MALIGNANT NEOPLASM OF COLON: Primary | ICD-10-CM

## 2024-05-13 LAB — GLUCOSE BLD MANUAL STRIP-MCNC: 119 MG/DL (ref 74–99)

## 2024-05-13 PROCEDURE — 3700000002 HC GENERAL ANESTHESIA TIME - EACH INCREMENTAL 1 MINUTE

## 2024-05-13 PROCEDURE — 7100000009 HC PHASE TWO TIME - INITIAL BASE CHARGE

## 2024-05-13 PROCEDURE — 45378 DIAGNOSTIC COLONOSCOPY: CPT | Performed by: INTERNAL MEDICINE

## 2024-05-13 PROCEDURE — 2500000004 HC RX 250 GENERAL PHARMACY W/ HCPCS (ALT 636 FOR OP/ED): Performed by: STUDENT IN AN ORGANIZED HEALTH CARE EDUCATION/TRAINING PROGRAM

## 2024-05-13 PROCEDURE — 2500000004 HC RX 250 GENERAL PHARMACY W/ HCPCS (ALT 636 FOR OP/ED): Performed by: NURSE ANESTHETIST, CERTIFIED REGISTERED

## 2024-05-13 PROCEDURE — 7100000010 HC PHASE TWO TIME - EACH INCREMENTAL 1 MINUTE

## 2024-05-13 PROCEDURE — 3700000001 HC GENERAL ANESTHESIA TIME - INITIAL BASE CHARGE

## 2024-05-13 PROCEDURE — G0105 COLORECTAL SCRN; HI RISK IND: HCPCS | Performed by: INTERNAL MEDICINE

## 2024-05-13 PROCEDURE — 82947 ASSAY GLUCOSE BLOOD QUANT: CPT

## 2024-05-13 RX ORDER — SODIUM CHLORIDE, SODIUM LACTATE, POTASSIUM CHLORIDE, CALCIUM CHLORIDE 600; 310; 30; 20 MG/100ML; MG/100ML; MG/100ML; MG/100ML
20 INJECTION, SOLUTION INTRAVENOUS CONTINUOUS
Status: DISCONTINUED | OUTPATIENT
Start: 2024-05-13 | End: 2024-05-14 | Stop reason: HOSPADM

## 2024-05-13 RX ORDER — SODIUM CHLORIDE, SODIUM LACTATE, POTASSIUM CHLORIDE, CALCIUM CHLORIDE 600; 310; 30; 20 MG/100ML; MG/100ML; MG/100ML; MG/100ML
50 INJECTION, SOLUTION INTRAVENOUS CONTINUOUS
Status: DISCONTINUED | OUTPATIENT
Start: 2024-05-13 | End: 2024-05-14 | Stop reason: HOSPADM

## 2024-05-13 RX ORDER — PROPOFOL 10 MG/ML
INJECTION, EMULSION INTRAVENOUS AS NEEDED
Status: DISCONTINUED | OUTPATIENT
Start: 2024-05-13 | End: 2024-05-13

## 2024-05-13 RX ADMIN — SODIUM CHLORIDE, POTASSIUM CHLORIDE, SODIUM LACTATE AND CALCIUM CHLORIDE 50 ML/HR: 600; 310; 30; 20 INJECTION, SOLUTION INTRAVENOUS at 12:01

## 2024-05-13 RX ADMIN — PROPOFOL 150 MG: 10 INJECTION, EMULSION INTRAVENOUS at 14:01

## 2024-05-13 SDOH — HEALTH STABILITY: MENTAL HEALTH: CURRENT SMOKER: 1

## 2024-05-13 ASSESSMENT — PAIN - FUNCTIONAL ASSESSMENT
PAIN_FUNCTIONAL_ASSESSMENT: 0-10

## 2024-05-13 ASSESSMENT — PAIN SCALES - GENERAL
PAINLEVEL_OUTOF10: 0 - NO PAIN
PAIN_LEVEL: 0
PAINLEVEL_OUTOF10: 0 - NO PAIN

## 2024-05-13 NOTE — Clinical Note
Patient tolerated procedure well. Appears comfortable with no complaints of pain. VS stable. Arousable prior to transport. Patient transported to [facility] via cart.  Report called per CRNA.  Handoff completed.

## 2024-05-13 NOTE — ANESTHESIA PREPROCEDURE EVALUATION
Estrella Melton is a 69 y.o. female here for:    Colonoscopy  With Melia Cota MD  Encounter for screening for malignant neoplasm of colon    Relevant Problems   Cardiac   (+) CAD (coronary artery disease)   (+) Hyperlipidemia      Pulmonary   (+) Chronic obstructive lung disease (Multi)      Endocrine   (+) Class 2 severe obesity due to excess calories with serious comorbidity and body mass index (BMI) of 37.0 to 37.9 in adult (Multi)   (+) Type 2 diabetes mellitus without complication, without long-term current use of insulin (Multi)      Musculoskeletal   (+) Osteoarthritis       Lab Results   Component Value Date    HGB 17.0 (H) 10/24/2023    HCT 53.3 (H) 10/24/2023    WBC 10.3 10/24/2023     10/24/2023     10/24/2023    K 5.0 10/24/2023    CL 94 (L) 10/24/2023    CREATININE 0.72 10/24/2023    BUN 10 10/24/2023       Social History     Tobacco Use   Smoking Status Every Day    Types: Cigarettes   Smokeless Tobacco Never       Allergies   Allergen Reactions    Canagliflozin Other     Yeast infection    Hydrocodone-Acetaminophen Hives and Itching    Hydrocodone-Guaifenesin Unknown    Penicillin Unknown    Strawberry Extract Hives    Codeine Unknown, Hives, Rash and Swelling       Current Outpatient Medications   Medication Instructions    albuterol 90 mcg/actuation inhaler     Breo Ellipta 100-25 mcg/dose inhaler 1 puff, inhalation, Daily    cyclobenzaprine (FLEXERIL) 10 mg, oral    ipratropium-albuteroL (Duo-Neb) 0.5-2.5 mg/3 mL nebulizer solution inhalation, Every 6 hours PRN    Lantus Solostar U-100 Insulin 100 unit/mL (3 mL) pen     lisinopril 10 mg, oral, Daily    metFORMIN (Glucophage) 1,000 mg tablet oral, 2 times daily    OneTouch Ultra Test strip     oxyCODONE-acetaminophen (Percocet) 7.5-325 mg tablet 0.5 tablets, oral    simvastatin (Zocor) 40 mg tablet 1 tablet, oral, Nightly       Past Surgical History:   Procedure Laterality Date    COLON SURGERY      COLON RESECTION-DIVERTICULITIS     COLONOSCOPY      HERNIA REPAIR      HIP ARTHROPLASTY Right     HYSTERECTOMY         No family history on file.    NPO Details:  No data recorded    Physical Exam    Airway  Mallampati: II  TM distance: >3 FB  Neck ROM: full     Cardiovascular - normal exam     Dental - normal exam     Pulmonary - normal exam     Abdominal            Anesthesia Plan    History of general anesthesia?: yes  History of complications of general anesthesia?: no    ASA 3     MAC     The patient is a current smoker.    intravenous induction   Anesthetic plan and risks discussed with patient.    Plan discussed with CRNA.

## 2024-05-13 NOTE — NURSING NOTE
Discharge instructions given to patient who verbalize understanding.  Patient states will use home oxygen upon return to her house,  is currently 92% on room air.

## 2024-05-13 NOTE — DISCHARGE INSTRUCTIONS
Patient Instructions after a Colonoscopy      The anesthetics, sedatives or narcotics which were given to you today will be acting in your body for the next 24 hours, so you might feel a little sleepy or groggy.  This feeling should slowly wear off. Carefully read and follow the instructions.     You received sedation today:  - Do not drive or operate any machinery or power tools of any kind.   - No alcoholic beverages today, not even beer or wine.  - Do not make any important decisions or sign any legal documents.  - No over the counter medications that contain alcohol or that may cause drowsiness.  - Do not make any important decisions or sign any legal documents.    While it is common to experience mild to moderate abdominal distention, gas, or belching after your procedure, if any of these symptoms occur following discharge from the GI Lab or within one week of having your procedure, call the [facility] to be advised whether a visit to your nearest Urgent Care or Emergency Department is indicated.  Take this paper with you if you go.     - If you develop an allergic reaction to the medications that were given during your procedure such as difficulty breathing, rash, hives, severe nausea, vomiting or lightheadedness.  - If you experience chest pain, shortness of breath, severe abdominal pain, fevers and chills.  -If you develop signs and symptoms of bleeding such as blood in your spit, if your stools turn black, tarry, or bloody  - If you have not urinated within 8 hours following your procedure.  - If your IV site becomes painful, red, inflamed, or looks infected.    If you received a biopsy/polypectomy/sphincterotomy the following instructions apply below:    __ Do not use Aspirin containing products, non-steroidal medications or anti-coagulants for one week following your procedure. (Examples of these types of medications are: Advil, Arthrotec, Aleve, Coumadin, Ecotrin, Heparin, Ibuprofen,  Indocin, Motrin, Naprosyn, Nuprin, Plavix, Vioxx, and Voltarin, or their generic forms.  This list is not all-inclusive.  Check with your physician or pharmacist before resuming medications.)   __ Eat a soft diet today.  Avoid foods that are poorly digested for the next 24 hours.  These foods would include: nuts, beans, lettuce, red meats, and fried foods. Start with liquids and advance your diet as tolerated, gradually work up to eating solids.   __ Do not have a Barium Study or Enema for one week.    Your physician recommends the additional following instructions:    -You have a contact number available for emergencies. The signs and symptoms of potential delayed complications were discussed with you. You may return to normal activities tomorrow.  -Resume your previous diet.  -Continue your present medications.   -We are waiting for your pathology results.  -Your physician has recommended a repeat colonoscopy (date to be determined after pending pathology results are reviewed) for surveillance based on pathology results.  -The findings and recommendations have been discussed with you.  -The findings and recommendations were discussed with your family.  - Please see Medication Reconciliation Form for new medication/medications prescribed.       If you experience any problems or have any questions following discharge from the GI Lab, please call:        Nurse Signature                                                                        Date___________________                                                                            Patient/Responsible Party Signature                                        Date___________________

## 2024-05-13 NOTE — ANESTHESIA POSTPROCEDURE EVALUATION
Patient: Estrella Melton    Procedure Summary       Date: 05/13/24 Room / Location: [facility]    Anesthesia Start: 1356 Anesthesia Stop: 1411    Procedure: COLONOSCOPY Diagnosis:       Encounter for screening for malignant neoplasm of colon      Encounter for screening for malignant neoplasm of colon    Scheduled Providers: Melia Cota MD; Bryan Quintero DO Responsible Provider: Mickey Khan MD    Anesthesia Type: MAC ASA Status: 3            Anesthesia Type: MAC    Vitals Value Taken Time   /62 05/13/24 1411   Temp 36.5 05/13/24 1411   Pulse 98 05/13/24 1411   Resp 18 05/13/24 1411   SpO2 98 05/13/24 1411       Anesthesia Post Evaluation    Patient location during evaluation: PACU  Patient participation: complete - patient participated  Level of consciousness: awake  Pain score: 0  Pain management: adequate  Airway patency: patent  Cardiovascular status: acceptable and stable  Respiratory status: acceptable and nasal cannula  Hydration status: acceptable  Postoperative Nausea and Vomiting: none      No notable events documented.

## 2024-05-13 NOTE — PRE-SEDATION DOCUMENTATION
Patient: Estrella Melton  MRN: [id number]    Pre-sedation Evaluation:  Sedation necessary for: Immobility and Analgesia  Requesting service: GI SERVICE    History of Present Illness: SCREENING COLONOSCOPY      Past Medical History:   Diagnosis Date    Acute respiratory failure with hypercapnia (Multi) 10/10/2013    Arthritis     COPD (chronic obstructive pulmonary disease) (Multi)     Coronary artery disease     Diverticulosis     Hyperlipidemia     Hypertension     Type 2 diabetes mellitus (Multi)        Principle problems:  Patient Active Problem List    Diagnosis Date Noted    Type 2 diabetes mellitus without complication, without long-term current use of insulin (Multi) 10/24/2023    Chronic obstructive lung disease (Multi) 10/24/2023    Adenomatous polyp of colon 10/24/2023    Encounter for subsequent annual wellness visit (AWV) in Medicare patient 10/24/2023    Class 2 severe obesity due to excess calories with serious comorbidity and body mass index (BMI) of 37.0 to 37.9 in adult (Multi) 10/24/2023    Heart palpitations 10/12/2017    Acute encephalopathy 10/10/2013    Hyperlipidemia 08/29/2013    Nicotine use disorder 08/29/2013    CAD (coronary artery disease) 08/27/2013    Osteoarthritis 08/20/2013     Allergies:  Allergies   Allergen Reactions    Canagliflozin Other     Yeast infection    Hydrocodone-Acetaminophen Hives and Itching    Hydrocodone-Guaifenesin Unknown    Penicillin Unknown    Strawberry Extract Hives    Codeine Unknown, Hives, Rash and Swelling     PTA/Current Medications:  (Not in a hospital admission)    Current Outpatient Medications   Medication Sig Dispense Refill    albuterol 90 mcg/actuation inhaler       Breo Ellipta 100-25 mcg/dose inhaler Inhale 1 puff once daily.      Lantus Solostar U-100 Insulin 100 unit/mL (3 mL) pen       lisinopril 10 mg tablet Take 1 tablet (10 mg) by mouth once daily.      metFORMIN (Glucophage) 1,000 mg tablet Take by mouth twice a day.      OneTouch Ultra Test  strip       oxyCODONE-acetaminophen (Percocet) 7.5-325 mg tablet Take 0.5 tablets by mouth.      simvastatin (Zocor) 40 mg tablet Take 1 tablet (40 mg) by mouth once daily at bedtime.      cyclobenzaprine (Flexeril) 10 mg tablet Take 1 tablet (10 mg) by mouth.      ipratropium-albuteroL (Duo-Neb) 0.5-2.5 mg/3 mL nebulizer solution Inhale every 6 hours if needed.       Current Facility-Administered Medications   Medication Dose Route Frequency Provider Last Rate Last Admin    lactated Ringer's infusion  50 mL/hr intravenous Continuous Bryan Quintero, DO 50 mL/hr at 05/13/24 1201 50 mL/hr at 05/13/24 1201    lactated Ringer's infusion  20 mL/hr intravenous Continuous Melia Cota MD         Past Surgical History:   has a past surgical history that includes Colon surgery; Hernia repair; Colonoscopy; Hysterectomy; and Hip Arthroplasty (Right).    Recent sedation/surgery (24 hours) No    Review of Systems:  Please check all that apply: No significant medical history    Pregnancy test completed prior to procedure on any menstruating female: none        NPO guidelines met: Yes    Physical Exam    Airway  Mallampati: II     Cardiovascular - normal exam  Rhythm: regular  Rate: normal     Dental    Pulmonary - normal exam  Breath sounds clear to auscultation         Plan    ASA 3     Moderate

## 2024-05-13 NOTE — Clinical Note
Huddle and Timeout completed together with team. Patient wristband and KIMBERLEY information verified.  Anesthesia safety check completed

## 2024-05-28 DIAGNOSIS — R00.2 HEART PALPITATIONS: ICD-10-CM

## 2024-05-28 DIAGNOSIS — E11.9 TYPE 2 DIABETES MELLITUS WITHOUT COMPLICATION, WITHOUT LONG-TERM CURRENT USE OF INSULIN (MULTI): Primary | ICD-10-CM

## 2024-05-28 DIAGNOSIS — E78.2 MIXED HYPERLIPIDEMIA: ICD-10-CM

## 2024-05-28 RX ORDER — SIMVASTATIN 40 MG/1
40 TABLET, FILM COATED ORAL NIGHTLY
Qty: 30 TABLET | Refills: 0 | Status: SHIPPED | OUTPATIENT
Start: 2024-05-28

## 2024-05-28 RX ORDER — LISINOPRIL 10 MG/1
10 TABLET ORAL DAILY
Qty: 30 TABLET | Refills: 0 | Status: SHIPPED | OUTPATIENT
Start: 2024-05-28

## 2024-05-28 RX ORDER — INSULIN GLARGINE 100 [IU]/ML
INJECTION, SOLUTION SUBCUTANEOUS
Qty: 3 ML | Refills: 0 | Status: SHIPPED | OUTPATIENT
Start: 2024-05-28

## 2024-05-28 RX ORDER — METFORMIN HYDROCHLORIDE 1000 MG/1
1000 TABLET ORAL
Qty: 60 TABLET | Refills: 0 | Status: SHIPPED | OUTPATIENT
Start: 2024-05-28

## 2024-05-28 NOTE — TELEPHONE ENCOUNTER
Rx Refill Request Telephone Encounter    Name:  Estrella Melton  :  [id number]  Medication Name:    Lantus Solostar U-100 Insulin 100 unit/mL (3 mL) pen & pen needles   simvastatin (Zocor) 40 mg tablet   metFORMIN (Glucophage) 1,000 mg tablet   lisinopril 10 mg tablet     Specific Pharmacy location:  [facility]  Date of last appointment:  10/24/23  Date of next appointment:  N/A   Best number to reach patient:  [phone]      ALSO, PT WAS UNABLE TO COMPLETE COLONOSCOPY - SHE ASKED THAT A NEW REFERRAL BE PLACED. PLEASE ADVISE.

## 2024-05-30 ENCOUNTER — TELEPHONE (OUTPATIENT)
Dept: PRIMARY CARE | Facility: CLINIC | Age: 69
End: 2024-05-30
Payer: MEDICARE

## 2024-05-30 DIAGNOSIS — E11.9 TYPE 2 DIABETES MELLITUS WITHOUT COMPLICATION, WITHOUT LONG-TERM CURRENT USE OF INSULIN (MULTI): Primary | ICD-10-CM

## 2024-05-30 NOTE — TELEPHONE ENCOUNTER
Patient called in stating she is needing a prescription for pen needles to be sent to the [facility]'s on [address]. She stated she is needing these for her Lantus Solostar  Please Advise

## 2024-06-01 RX ORDER — PEN NEEDLE, DIABETIC 30 GX3/16"
NEEDLE, DISPOSABLE MISCELLANEOUS
Qty: 30 EACH | Refills: 5 | Status: SHIPPED | OUTPATIENT
Start: 2024-06-01

## 2024-06-23 ENCOUNTER — TELEPHONE (OUTPATIENT)
Dept: PRIMARY CARE | Facility: CLINIC | Age: 69
End: 2024-06-23
Payer: MEDICARE

## 2024-06-23 NOTE — PROGRESS NOTES
You should’ve called at 6:18 PM on June 23. Call was returned at 6:18 PM. Patient sees Dr. Charles. She had a coughing episode and coughed so much this afternoon. She nearly passed out. She has a history of COPD and is on oxygen. Her O2 sat is 88 right now she just put her oxygen on does not generally wear during the day she has not tried nebulizer treatment. She has not tried Mucinex or anything for the cough and has only had one episode of coughing lately and that was this afternoon, she does not feel wheezy. I advise her to try a nebulizer treatment and then Mucinex this evening. I advised if her oxygen saturation is not in the 90s on her oxygen, she needs to go to the emergency room or any further coughing spells or shortness of breath. Otherwise I advise. She needs to be seen this week in the office if these symptoms persist .she agreed.

## 2024-06-26 DIAGNOSIS — J44.9 CHRONIC OBSTRUCTIVE PULMONARY DISEASE, UNSPECIFIED COPD TYPE (HCC): ICD-10-CM

## 2024-06-27 RX ORDER — FLUTICASONE FUROATE AND VILANTEROL 100; 25 UG/1; UG/1
1 POWDER RESPIRATORY (INHALATION) DAILY
Qty: 60 EACH | Refills: 1 | Status: SHIPPED | OUTPATIENT
Start: 2024-06-27

## 2024-06-27 NOTE — TELEPHONE ENCOUNTER
I sent the inhaler but she is overdue for an appt.   Pls try to billy her in today(even if it's a virtual visit) or tomorrow

## 2024-07-03 ENCOUNTER — APPOINTMENT (OUTPATIENT)
Dept: PRIMARY CARE | Facility: CLINIC | Age: 69
End: 2024-07-03
Payer: MEDICARE

## 2024-07-03 DIAGNOSIS — E11.9 TYPE 2 DIABETES MELLITUS WITHOUT COMPLICATION, WITHOUT LONG-TERM CURRENT USE OF INSULIN (MULTI): ICD-10-CM

## 2024-07-03 DIAGNOSIS — R00.2 HEART PALPITATIONS: ICD-10-CM

## 2024-07-03 NOTE — TELEPHONE ENCOUNTER
Rx Refill Request     Name: Estrella FLOREZ Geronimo  :  1955   Medication Name:  lisinopril and metformin   Specific Pharmacy location:  [facility]   Date of last appointment: 10/24/23  Date of next appointment: 7/3/24  Best number to reach patient:  [phone]

## 2024-07-04 RX ORDER — LISINOPRIL 10 MG/1
10 TABLET ORAL DAILY
Qty: 30 TABLET | Refills: 0 | OUTPATIENT
Start: 2024-07-04

## 2024-07-04 RX ORDER — METFORMIN HYDROCHLORIDE 1000 MG/1
1000 TABLET ORAL
Qty: 60 TABLET | Refills: 0 | OUTPATIENT
Start: 2024-07-04

## 2024-08-08 ENCOUNTER — OFFICE VISIT (OUTPATIENT)
Dept: PRIMARY CARE CLINIC | Age: 69
End: 2024-08-08
Payer: MEDICARE

## 2024-08-08 VITALS
DIASTOLIC BLOOD PRESSURE: 68 MMHG | HEIGHT: 62 IN | SYSTOLIC BLOOD PRESSURE: 98 MMHG | BODY MASS INDEX: 39.56 KG/M2 | OXYGEN SATURATION: 88 % | HEART RATE: 100 BPM | WEIGHT: 215 LBS

## 2024-08-08 DIAGNOSIS — J44.9 CHRONIC OBSTRUCTIVE PULMONARY DISEASE, UNSPECIFIED COPD TYPE (HCC): ICD-10-CM

## 2024-08-08 DIAGNOSIS — F17.200 SMOKER: ICD-10-CM

## 2024-08-08 DIAGNOSIS — R05.3 CHRONIC COUGH: Primary | ICD-10-CM

## 2024-08-08 DIAGNOSIS — B07.8 OTHER VIRAL WARTS: ICD-10-CM

## 2024-08-08 DIAGNOSIS — Z87.891 PERSONAL HISTORY OF TOBACCO USE: ICD-10-CM

## 2024-08-08 DIAGNOSIS — Z12.11 COLON CANCER SCREENING: ICD-10-CM

## 2024-08-08 DIAGNOSIS — I10 HYPERTENSION, UNSPECIFIED TYPE: ICD-10-CM

## 2024-08-08 DIAGNOSIS — E11.9 TYPE 2 DIABETES MELLITUS WITHOUT COMPLICATION, WITHOUT LONG-TERM CURRENT USE OF INSULIN (HCC): ICD-10-CM

## 2024-08-08 DIAGNOSIS — M54.9 CHRONIC BACK PAIN, UNSPECIFIED BACK LOCATION, UNSPECIFIED BACK PAIN LATERALITY: ICD-10-CM

## 2024-08-08 DIAGNOSIS — E78.5 HYPERLIPIDEMIA, UNSPECIFIED HYPERLIPIDEMIA TYPE: ICD-10-CM

## 2024-08-08 DIAGNOSIS — E11.21 DIABETIC NEPHROPATHY ASSOCIATED WITH TYPE 2 DIABETES MELLITUS (HCC): ICD-10-CM

## 2024-08-08 DIAGNOSIS — L30.9 ECZEMA OF SCALP: ICD-10-CM

## 2024-08-08 DIAGNOSIS — Z12.31 SCREENING MAMMOGRAM FOR BREAST CANCER: ICD-10-CM

## 2024-08-08 DIAGNOSIS — G89.29 CHRONIC BACK PAIN, UNSPECIFIED BACK LOCATION, UNSPECIFIED BACK PAIN LATERALITY: ICD-10-CM

## 2024-08-08 DIAGNOSIS — E66.01 SEVERE OBESITY (BMI 35.0-39.9) WITH COMORBIDITY (HCC): ICD-10-CM

## 2024-08-08 DIAGNOSIS — J44.1 COPD WITH ACUTE EXACERBATION (HCC): ICD-10-CM

## 2024-08-08 LAB — HBA1C MFR BLD: 7.8 %

## 2024-08-08 PROCEDURE — 3051F HG A1C>EQUAL 7.0%<8.0%: CPT | Performed by: INTERNAL MEDICINE

## 2024-08-08 PROCEDURE — 99214 OFFICE O/P EST MOD 30 MIN: CPT | Performed by: INTERNAL MEDICINE

## 2024-08-08 PROCEDURE — 1123F ACP DISCUSS/DSCN MKR DOCD: CPT | Performed by: INTERNAL MEDICINE

## 2024-08-08 PROCEDURE — 3074F SYST BP LT 130 MM HG: CPT | Performed by: INTERNAL MEDICINE

## 2024-08-08 PROCEDURE — 83036 HEMOGLOBIN GLYCOSYLATED A1C: CPT | Performed by: INTERNAL MEDICINE

## 2024-08-08 PROCEDURE — 3078F DIAST BP <80 MM HG: CPT | Performed by: INTERNAL MEDICINE

## 2024-08-08 PROCEDURE — G0296 VISIT TO DETERM LDCT ELIG: HCPCS | Performed by: INTERNAL MEDICINE

## 2024-08-08 RX ORDER — LISINOPRIL 5 MG/1
5 TABLET ORAL DAILY
Qty: 90 TABLET | Refills: 3 | Status: SHIPPED | OUTPATIENT
Start: 2024-08-08

## 2024-08-08 RX ORDER — INSULIN GLARGINE 100 [IU]/ML
INJECTION, SOLUTION SUBCUTANEOUS
Qty: 75 ML | Refills: 10 | Status: SHIPPED | OUTPATIENT
Start: 2024-08-08

## 2024-08-08 RX ORDER — KETOCONAZOLE 20 MG/ML
SHAMPOO TOPICAL
Qty: 120 ML | Refills: 5 | Status: SHIPPED | OUTPATIENT
Start: 2024-08-08

## 2024-08-08 RX ORDER — BENZONATATE 200 MG/1
200 CAPSULE ORAL 2 TIMES DAILY PRN
Qty: 60 CAPSULE | Refills: 2 | Status: SHIPPED | OUTPATIENT
Start: 2024-08-08 | End: 2024-11-06

## 2024-08-08 RX ORDER — GUAIFENESIN 1200 MG/1
1 TABLET, EXTENDED RELEASE ORAL 2 TIMES DAILY PRN
Qty: 60 TABLET | Refills: 5 | Status: SHIPPED | OUTPATIENT
Start: 2024-08-08

## 2024-08-08 RX ORDER — CYCLOBENZAPRINE HCL 5 MG
5 TABLET ORAL 2 TIMES DAILY PRN
Qty: 60 TABLET | Refills: 1 | Status: SHIPPED | OUTPATIENT
Start: 2024-08-08

## 2024-08-08 RX ORDER — FLUTICASONE FUROATE AND VILANTEROL 100; 25 UG/1; UG/1
1 POWDER RESPIRATORY (INHALATION) DAILY
Qty: 60 EACH | Refills: 11 | Status: SHIPPED | OUTPATIENT
Start: 2024-08-08

## 2024-08-08 RX ORDER — ALBUTEROL SULFATE 90 UG/1
2 AEROSOL, METERED RESPIRATORY (INHALATION) EVERY 6 HOURS PRN
Qty: 6.7 G | Refills: 3 | Status: SHIPPED | OUTPATIENT
Start: 2024-08-08

## 2024-08-08 RX ORDER — SIMVASTATIN 40 MG
40 TABLET ORAL NIGHTLY
Qty: 90 TABLET | Refills: 3 | Status: SHIPPED | OUTPATIENT
Start: 2024-08-08

## 2024-08-08 SDOH — ECONOMIC STABILITY: INCOME INSECURITY: HOW HARD IS IT FOR YOU TO PAY FOR THE VERY BASICS LIKE FOOD, HOUSING, MEDICAL CARE, AND HEATING?: NOT HARD AT ALL

## 2024-08-08 SDOH — ECONOMIC STABILITY: FOOD INSECURITY: WITHIN THE PAST 12 MONTHS, THE FOOD YOU BOUGHT JUST DIDN'T LAST AND YOU DIDN'T HAVE MONEY TO GET MORE.: NEVER TRUE

## 2024-08-08 SDOH — ECONOMIC STABILITY: FOOD INSECURITY: WITHIN THE PAST 12 MONTHS, YOU WORRIED THAT YOUR FOOD WOULD RUN OUT BEFORE YOU GOT MONEY TO BUY MORE.: NEVER TRUE

## 2024-08-08 ASSESSMENT — PATIENT HEALTH QUESTIONNAIRE - PHQ9
SUM OF ALL RESPONSES TO PHQ QUESTIONS 1-9: 0
SUM OF ALL RESPONSES TO PHQ QUESTIONS 1-9: 0
SUM OF ALL RESPONSES TO PHQ9 QUESTIONS 1 & 2: 0
SUM OF ALL RESPONSES TO PHQ QUESTIONS 1-9: 0
2. FEELING DOWN, DEPRESSED OR HOPELESS: NOT AT ALL
SUM OF ALL RESPONSES TO PHQ QUESTIONS 1-9: 0
1. LITTLE INTEREST OR PLEASURE IN DOING THINGS: NOT AT ALL

## 2024-08-08 NOTE — PROGRESS NOTES
External Referral to Gastroenterolgy    Eczema of scalp  -     Summer Flaherty MD, Dermatology, [place]  -     ketoconazole (NIZORAL) 2 % shampoo; Apply topically 3 times a week    Other viral warts  -     Summer Flaherty MD, Dermatology, [place]    Diabetic nephropathy associated with type 2 diabetes mellitus (HCC)  -     lisinopril (PRINIVIL;ZESTRIL) 5 MG tablet; Take 1 tablet by mouth daily    Hypertension, unspecified type  -     lisinopril (PRINIVIL;ZESTRIL) 5 MG tablet; Take 1 tablet by mouth daily    Smoker  -     CT lung screen [Initial/Annual]; Future    Personal history of tobacco use  -     [place] VISIT TO DISCUSS LUNG CA SCREEN W LDCT  -     CT Lung Screen (Initial/Annual/Baseline); Future        No follow-ups on file.    Pascual Aguirre MD    Discussed with the patient the current USPSTF guidelines released March 9, 2021 for screening for lung cancer.    For adults aged 50 to 80 years who have a 20 pack-year smoking history and currently smoke or have quit within the past 15 years the grade B recommendation is to:  Screen for lung cancer with low-dose computed tomography (LDCT) every year.  Stop screening once a person has not smoked for 15 years or has a health problem that limits life expectancy or the ability to have lung surgery.    The patient  reports that she has been smoking e-cigarettes and cigarettes. She started smoking about 38 years ago. She has a 30 pack-year smoking history. She has never used smokeless tobacco.. Discussed with patient the risks and benefits of screening, including over-diagnosis, false positive rate, and total radiation exposure.  The patient currently exhibits no signs or symptoms suggestive of lung cancer.  Discussed with patient the importance of compliance with yearly annual lung cancer screenings and willingness to undergo diagnosis and treatment if screening scan is positive.  In addition, the patient was counseled regarding the importance of remaining smoke free

## 2024-08-12 ENCOUNTER — APPOINTMENT (OUTPATIENT)
Dept: GASTROENTEROLOGY | Facility: CLINIC | Age: 69
End: 2024-08-12
Payer: MEDICARE

## 2024-08-14 DIAGNOSIS — Z12.11 SCREENING FOR COLON CANCER: Primary | ICD-10-CM

## 2024-08-14 ASSESSMENT — ENCOUNTER SYMPTOMS
APNEA: 0
BACK PAIN: 1
WHEEZING: 1
FACIAL SWELLING: 0
PHOTOPHOBIA: 0
ABDOMINAL DISTENTION: 0
CHOKING: 0
CHEST TIGHTNESS: 1
BLOOD IN STOOL: 0
BLURRED VISION: 0
COUGH: 1

## 2024-08-14 ASSESSMENT — COPD QUESTIONNAIRES: COPD: 1

## 2024-08-15 RX ORDER — POLYETHYLENE GLYCOL 3350, SODIUM SULFATE ANHYDROUS, SODIUM BICARBONATE, SODIUM CHLORIDE, POTASSIUM CHLORIDE 236; 22.74; 6.74; 5.86; 2.97 G/4L; G/4L; G/4L; G/4L; G/4L
4 POWDER, FOR SOLUTION ORAL ONCE
Qty: 4000 ML | Refills: 0 | Status: SHIPPED | OUTPATIENT
Start: 2024-08-15 | End: 2024-08-15

## 2024-08-26 ENCOUNTER — HOSPITAL ENCOUNTER (OUTPATIENT)
Dept: WOMENS IMAGING | Age: 69
Discharge: HOME | End: 2024-08-28
Payer: MEDICARE

## 2024-08-26 ENCOUNTER — HOSPITAL ENCOUNTER (OUTPATIENT)
Dept: CT IMAGING | Age: 69
Discharge: HOME | End: 2024-08-28
Payer: MEDICARE

## 2024-08-26 VITALS — BODY MASS INDEX: 38.1 KG/M2 | HEIGHT: 63 IN

## 2024-08-26 DIAGNOSIS — Z87.891 PERSONAL HISTORY OF TOBACCO USE: ICD-10-CM

## 2024-08-26 DIAGNOSIS — Z12.31 SCREENING MAMMOGRAM FOR BREAST CANCER: ICD-10-CM

## 2024-08-26 PROCEDURE — 77063 BREAST TOMOSYNTHESIS BI: CPT

## 2024-08-26 PROCEDURE — 71271 CT THORAX LUNG CANCER SCR C-: CPT

## 2024-09-06 ENCOUNTER — TELEPHONE (OUTPATIENT)
Dept: PRIMARY CARE CLINIC | Age: 69
End: 2024-09-06

## 2024-09-06 NOTE — TELEPHONE ENCOUNTER
Pt's case manage Mera Morales [facility] [phone] says the patient has noted that she has food insecurity and is in need of assistance with ADL. Mera is going to do a health assessment and invites to Provider to attend/participate. If interested please call Mera [phone]

## 2024-09-11 RX ORDER — GUAIFENESIN 1200 MG/1
1 TABLET, EXTENDED RELEASE ORAL DAILY PRN
COMMUNITY
Start: 2024-08-08

## 2024-09-16 ENCOUNTER — HOSPITAL ENCOUNTER (OUTPATIENT)
Dept: GASTROENTEROLOGY | Facility: HOSPITAL | Age: 69
Setting detail: OUTPATIENT SURGERY
Discharge: HOME | End: 2024-09-16
Payer: MEDICARE

## 2024-09-16 ENCOUNTER — HOSPITAL ENCOUNTER (OUTPATIENT)
Dept: CARDIOLOGY | Facility: HOSPITAL | Age: 69
Setting detail: OUTPATIENT SURGERY
Discharge: HOME | End: 2024-09-16
Payer: MEDICARE

## 2024-09-16 ENCOUNTER — ANESTHESIA (OUTPATIENT)
Dept: GASTROENTEROLOGY | Facility: HOSPITAL | Age: 69
End: 2024-09-16
Payer: MEDICARE

## 2024-09-16 ENCOUNTER — ANESTHESIA EVENT (OUTPATIENT)
Dept: GASTROENTEROLOGY | Facility: HOSPITAL | Age: 69
End: 2024-09-16
Payer: MEDICARE

## 2024-09-16 VITALS
OXYGEN SATURATION: 94 % | SYSTOLIC BLOOD PRESSURE: 130 MMHG | DIASTOLIC BLOOD PRESSURE: 85 MMHG | HEIGHT: 63 IN | TEMPERATURE: 97.2 F | RESPIRATION RATE: 18 BRPM | BODY MASS INDEX: 36.91 KG/M2 | WEIGHT: 208.34 LBS | HEART RATE: 91 BPM

## 2024-09-16 DIAGNOSIS — Z12.11 ENCOUNTER FOR SCREENING FOR MALIGNANT NEOPLASM OF COLON: Primary | ICD-10-CM

## 2024-09-16 LAB
ATRIAL RATE: 89 BPM
GLUCOSE BLD MANUAL STRIP-MCNC: 121 MG/DL (ref 74–99)
P AXIS: 67 DEGREES
P OFFSET: 191 MS
P ONSET: 145 MS
PR INTERVAL: 146 MS
Q ONSET: 218 MS
QRS COUNT: 15 BEATS
QRS DURATION: 68 MS
QT INTERVAL: 366 MS
QTC CALCULATION(BAZETT): 445 MS
QTC FREDERICIA: 417 MS
R AXIS: 56 DEGREES
T AXIS: 55 DEGREES
T OFFSET: 401 MS
VENTRICULAR RATE: 89 BPM

## 2024-09-16 PROCEDURE — 3700000002 HC GENERAL ANESTHESIA TIME - EACH INCREMENTAL 1 MINUTE

## 2024-09-16 PROCEDURE — 2500000004 HC RX 250 GENERAL PHARMACY W/ HCPCS (ALT 636 FOR OP/ED): Performed by: INTERNAL MEDICINE

## 2024-09-16 PROCEDURE — 45385 COLONOSCOPY W/LESION REMOVAL: CPT | Performed by: INTERNAL MEDICINE

## 2024-09-16 PROCEDURE — 2500000001 HC RX 250 WO HCPCS SELF ADMINISTERED DRUGS (ALT 637 FOR MEDICARE OP): Performed by: INTERNAL MEDICINE

## 2024-09-16 PROCEDURE — 2500000004 HC RX 250 GENERAL PHARMACY W/ HCPCS (ALT 636 FOR OP/ED): Performed by: NURSE ANESTHETIST, CERTIFIED REGISTERED

## 2024-09-16 PROCEDURE — 93010 ELECTROCARDIOGRAM REPORT: CPT | Performed by: INTERNAL MEDICINE

## 2024-09-16 PROCEDURE — 7100000009 HC PHASE TWO TIME - INITIAL BASE CHARGE

## 2024-09-16 PROCEDURE — 3700000001 HC GENERAL ANESTHESIA TIME - INITIAL BASE CHARGE

## 2024-09-16 PROCEDURE — 93005 ELECTROCARDIOGRAM TRACING: CPT

## 2024-09-16 PROCEDURE — 7100000010 HC PHASE TWO TIME - EACH INCREMENTAL 1 MINUTE

## 2024-09-16 PROCEDURE — 82947 ASSAY GLUCOSE BLOOD QUANT: CPT

## 2024-09-16 RX ORDER — SODIUM CHLORIDE, SODIUM LACTATE, POTASSIUM CHLORIDE, CALCIUM CHLORIDE 600; 310; 30; 20 MG/100ML; MG/100ML; MG/100ML; MG/100ML
20 INJECTION, SOLUTION INTRAVENOUS CONTINUOUS
Status: DISCONTINUED | OUTPATIENT
Start: 2024-09-16 | End: 2024-09-17 | Stop reason: HOSPADM

## 2024-09-16 RX ORDER — PROPOFOL 10 MG/ML
INJECTION, EMULSION INTRAVENOUS AS NEEDED
Status: DISCONTINUED | OUTPATIENT
Start: 2024-09-16 | End: 2024-09-16

## 2024-09-16 RX ORDER — DEXTROMETHORPHAN/PSEUDOEPHED 2.5-7.5/.8
DROPS ORAL AS NEEDED
Status: COMPLETED | OUTPATIENT
Start: 2024-09-16 | End: 2024-09-16

## 2024-09-16 SDOH — HEALTH STABILITY: MENTAL HEALTH: CURRENT SMOKER: 1

## 2024-09-16 ASSESSMENT — PAIN SCALES - GENERAL
PAINLEVEL_OUTOF10: 0 - NO PAIN
PAIN_LEVEL: 0

## 2024-09-16 ASSESSMENT — COLUMBIA-SUICIDE SEVERITY RATING SCALE - C-SSRS
2. HAVE YOU ACTUALLY HAD ANY THOUGHTS OF KILLING YOURSELF?: NO
1. IN THE PAST MONTH, HAVE YOU WISHED YOU WERE DEAD OR WISHED YOU COULD GO TO SLEEP AND NOT WAKE UP?: NO
6. HAVE YOU EVER DONE ANYTHING, STARTED TO DO ANYTHING, OR PREPARED TO DO ANYTHING TO END YOUR LIFE?: NO

## 2024-09-16 ASSESSMENT — PAIN - FUNCTIONAL ASSESSMENT
PAIN_FUNCTIONAL_ASSESSMENT: 0-10

## 2024-09-16 NOTE — DISCHARGE INSTRUCTIONS
Patient Instructions after a Colonoscopy      The anesthetics, sedatives or narcotics which were given to you today will be acting in your body for the next 24 hours, so you might feel a little sleepy or groggy.  This feeling should slowly wear off. Carefully read and follow the instructions.     You received sedation today:  - Do not drive or operate any machinery or power tools of any kind.   - No alcoholic beverages today, not even beer or wine.  - Do not make any important decisions or sign any legal documents.  - No over the counter medications that contain alcohol or that may cause drowsiness.  - Do not make any important decisions or sign any legal documents.  - Make sure you have someone with you for first 24 hours.    While it is common to experience mild to moderate abdominal distention, gas, or belching after your procedure, if any of these symptoms occur following discharge from the GI Lab or within one week of having your procedure, call the [facility] to be advised whether a visit to your nearest Urgent Care or Emergency Department is indicated.  Take this paper with you if you go.     - If you develop an allergic reaction to the medications that were given during your procedure such as difficulty breathing, rash, hives, severe nausea, vomiting or lightheadedness.  - If you experience chest pain, shortness of breath, severe abdominal pain, fevers and chills.  -If you develop signs and symptoms of bleeding such as blood in your spit, if your stools turn black, tarry, or bloody  - If you have not urinated within 8 hours following your procedure.  - If your IV site becomes painful, red, inflamed, or looks infected.    If you received a biopsy/polypectomy/sphincterotomy the following instructions apply below:    __ Do not use Aspirin containing products, non-steroidal medications or anti-coagulants for one week following your procedure. (Examples of these types of medications are: Advil,  Arthrotec, Aleve, Coumadin, Ecotrin, Heparin, Ibuprofen, Indocin, Motrin, Naprosyn, Nuprin, Plavix, Vioxx, and Voltarin, or their generic forms.  This list is not all-inclusive.  Check with your physician or pharmacist before resuming medications.)   __ Eat a soft diet today.  Avoid foods that are poorly digested for the next 24 hours.  These foods would include: nuts, beans, lettuce, red meats, and fried foods. Start with liquids and advance your diet as tolerated, gradually work up to eating solids.   __ Do not have a Barium Study or Enema for one week.    Your physician recommends the additional following instructions:    -You have a contact number available for emergencies. The signs and symptoms of potential delayed complications were discussed with you. You may return to normal activities tomorrow.  -Resume your previous diet.  -Continue your present medications.   -We are waiting for your pathology results.  -Your physician has recommended a repeat colonoscopy (date to be determined after pending pathology results are reviewed) for surveillance based on pathology results.  -The findings and recommendations have been discussed with you.  -The findings and recommendations were discussed with your family.  - Please see Medication Reconciliation Form for new medication/medications prescribed.       If you experience any problems or have any questions following discharge from the GI Lab, please call:        Nurse Signature                                                                        Date___________________                                                                            Patient/Responsible Party Signature                                        Date___________________

## 2024-09-16 NOTE — NURSING NOTE
VSS, no nausea or pain in Phase II Recovery. Patient tolerated PO intake of clear liquids and cookies. Upon review of discharge instructions no further questions remained. Repeat Colonoscopy agreed to for about 1 year.

## 2024-09-16 NOTE — Clinical Note
Patient tolerated procedure well. Appears comfortable with no complaints of pain. VS stable. Arousable prior to transport. Patient transported to [facility] via cart.  Report called per crna             . Handoff completed

## 2024-09-16 NOTE — ANESTHESIA POSTPROCEDURE EVALUATION
Patient: Estrella Melton    Procedure Summary       Date: 09/16/24 Room / Location: [facility]    Anesthesia Start: 1316 Anesthesia Stop: 1341    Procedure: COLONOSCOPY Diagnosis:       Encounter for screening for malignant neoplasm of colon      Encounter for screening for malignant neoplasm of colon    Scheduled Providers: Melia Cota MD; Tod Browne MD; Susanna Maldonado RN; Bertha Mckenna Responsible Provider: Tod Browne MD    Anesthesia Type: MAC ASA Status: 3            Anesthesia Type: MAC    Vitals Value Taken Time   /59 09/16/24 1343   Temp 36.5 09/16/24 1343   Pulse 98 09/16/24 1343   Resp 18 09/16/24 1343   SpO2 98 09/16/24 1343       Anesthesia Post Evaluation    Patient location during evaluation: bedside  Patient participation: complete - patient participated  Level of consciousness: awake and alert  Pain score: 0  Pain management: adequate  Airway patency: patent  Cardiovascular status: acceptable and stable  Respiratory status: acceptable and nasal cannula  Hydration status: acceptable  Postoperative Nausea and Vomiting: none      No notable events documented.

## 2024-09-16 NOTE — H&P
History Of Present Illness  Estrella Melton is a 69 y.o. female here for screening colonoscopy.     Past Medical History  Past Medical History:   Diagnosis Date    Acute respiratory failure with hypercapnia (Multi) 10/10/2013    Arthritis     Chronic pain disorder     Congestion of upper airway     COPD (chronic obstructive pulmonary disease) (Multi)     Coronary artery disease     Diverticulosis     Dysfunctional uterine bleeding     Hyperlipidemia     Hypertension     Lumbar disc disease     Oxygen dependent     PRN 2L    Type 2 diabetes mellitus (Multi)      Surgical History  Past Surgical History:   Procedure Laterality Date    BOWEL RESECTION      COLON RESECTION-DIVERTICULITIS    CARPAL TUNNEL RELEASE Right     COLONOSCOPY      CORONARY ANGIOPLASTY WITH STENT PLACEMENT      DILATION AND CURETTAGE OF UTERUS      HERNIA REPAIR      HIP ARTHROPLASTY Right     HYSTERECTOMY       Social History  She reports that she has been smoking cigarettes. She has never used smokeless tobacco. She reports that she does not drink alcohol and does not use drugs.    Family History  No family history on file.     Allergies  Allergies   Allergen Reactions    Canagliflozin Other     Yeast infection    Hydrocodone-Acetaminophen Hives and Itching    Hydrocodone-Guaifenesin Hives and Swelling    Penicillin Hives, Itching and Swelling    Strawberry Hives    Strawberry Extract Hives    Codeine Unknown, Hives, Rash and Swelling     Review of Systems   Review of Systems   Constitutional: Negative.  Negative for activity change, appetite change, chills, fatigue, fever and unexpected weight change.   HENT: Negative.     Respiratory: Negative.     Cardiovascular: Negative.  Negative for chest pain and palpitations.   Gastrointestinal: Negative.  Negative for abdominal distention, abdominal pain, anal bleeding, blood in stool, constipation, diarrhea, nausea, rectal pain and vomiting.   Skin: Negative.  Negative for color change and rash.    Neurological: Negative.  Negative for dizziness, tremors, seizures, weakness and headaches.   Psychiatric/Behavioral: Negative.  Negative for confusion.     Physical Exam   General appearance: No acute distress, cooperative.  Eyes: Nonicteric, no redness or proptosis  Ears, nose, mouth, and throat: Moist mucous membranes, tongue normal  Neck: Supple, no lymphadenopathy or thyromegaly  Lungs: Clear to auscultation bilaterally  CV: Regular rate and rhythm, no murmur; no pitting edema in the lower extremities  Abd: soft, non-tender; non-distended; normal active bowel sounds; NO  scars  Skin: No rashes or lesions; no liver stigmata  MSK: No deformities or joint edema/redness/tenderness  Neuro: Alert and oriented ×3, normal gait, non-focal NO asterixis    Last Recorded Vitals  There were no vitals taken for this visit.    Assessment/Plan        Melia Cota MD

## 2024-09-16 NOTE — ANESTHESIA PREPROCEDURE EVALUATION
Estrella Melton is a 69 y.o. female here for:    Colonoscopy  With Melia Cota MD  Encounter for screening for malignant neoplasm of colon    Relevant Problems   Cardiac   (+) CAD (coronary artery disease)   (+) Hyperlipidemia      Pulmonary   (+) Chronic obstructive lung disease (Multi)      Endocrine   (+) Class 2 severe obesity due to excess calories with serious comorbidity and body mass index (BMI) of 37.0 to 37.9 in adult (Multi)   (+) Type 2 diabetes mellitus without complication, without long-term current use of insulin (Multi)      Tobacco   (+) Nicotine use disorder       Lab Results   Component Value Date    HGB 17.0 (H) 10/24/2023    HCT 53.3 (H) 10/24/2023    WBC 10.3 10/24/2023     10/24/2023     10/24/2023    K 5.0 10/24/2023    CL 94 (L) 10/24/2023    CREATININE 0.72 10/24/2023    BUN 10 10/24/2023       Social History     Tobacco Use   Smoking Status Every Day    Types: Cigarettes   Smokeless Tobacco Never       Allergies   Allergen Reactions    Canagliflozin Other     Yeast infection    Hydrocodone-Acetaminophen Hives and Itching    Hydrocodone-Guaifenesin Hives and Swelling    Penicillin Hives, Itching and Swelling    Strawberry Hives    Strawberry Extract Hives    Codeine Unknown, Hives, Rash and Swelling       Current Outpatient Medications   Medication Instructions    albuterol 90 mcg/actuation inhaler     Breo Ellipta 100-25 mcg/dose inhaler 1 puff, inhalation, Daily    cyclobenzaprine (FLEXERIL) 10 mg, oral    guaiFENesin (Mucinex) 1,200 mg tablet extended release 12hr 1 tablet, oral, Daily PRN    insulin glargine (Lantus Solostar U-100 Insulin) 100 unit/mL (3 mL) pen Inject 15 units subcutaneously nightly    ipratropium-albuteroL (Duo-Neb) 0.5-2.5 mg/3 mL nebulizer solution inhalation, Every 6 hours PRN    lisinopril 10 mg, oral, Daily    metFORMIN (GLUCOPHAGE) 1,000 mg, oral, 2 times daily (morning and late afternoon)    OneTouch Ultra Test strip     oxyCODONE-acetaminophen  "(Percocet) 7.5-325 mg tablet 0.5 tablets, oral    pen needle, diabetic (Pen Needle) 31 gauge x 3/16\" needle Use 1 pen needle to inject insulin nightly.    simvastatin (ZOCOR) 40 mg, oral, Nightly       Past Surgical History:   Procedure Laterality Date    BOWEL RESECTION      COLON RESECTION-DIVERTICULITIS    CARPAL TUNNEL RELEASE Right     COLONOSCOPY      CORONARY ANGIOPLASTY WITH STENT PLACEMENT      DILATION AND CURETTAGE OF UTERUS      HERNIA REPAIR      HIP ARTHROPLASTY Right     HYSTERECTOMY         No family history on file.    NPO Details:  No data recorded    Physical Exam    Airway  Mallampati: III  TM distance: >3 FB     Cardiovascular    Dental    Pulmonary    Abdominal            Anesthesia Plan    History of general anesthesia?: yes  History of complications of general anesthesia?: no    ASA 3     MAC     The patient is a current smoker.  Patient was previously instructed to abstain from smoking on day of procedure.  Patient did not smoke on day of procedure.    intravenous induction   Anesthetic plan and risks discussed with patient.    Plan discussed with CRNA.        "

## 2024-09-20 ENCOUNTER — TELEMEDICINE (OUTPATIENT)
Dept: FAMILY MEDICINE CLINIC | Age: 69
End: 2024-09-20

## 2024-09-20 DIAGNOSIS — Z00.00 MEDICARE ANNUAL WELLNESS VISIT, SUBSEQUENT: Primary | ICD-10-CM

## 2024-09-20 DIAGNOSIS — Z71.89 ACP (ADVANCE CARE PLANNING): ICD-10-CM

## 2024-09-20 DIAGNOSIS — Z01.00 VISIT FOR EYE AND VISION EXAM: ICD-10-CM

## 2024-09-20 DIAGNOSIS — J44.9 CHRONIC OBSTRUCTIVE PULMONARY DISEASE, UNSPECIFIED COPD TYPE (HCC): ICD-10-CM

## 2024-09-20 RX ORDER — NEBULIZER ACCESSORIES
KIT MISCELLANEOUS
Qty: 1 KIT | Refills: 0 | Status: SHIPPED | OUTPATIENT
Start: 2024-09-20

## 2024-09-20 ASSESSMENT — PATIENT HEALTH QUESTIONNAIRE - PHQ9
SUM OF ALL RESPONSES TO PHQ QUESTIONS 1-9: 0
1. LITTLE INTEREST OR PLEASURE IN DOING THINGS: NOT AT ALL
SUM OF ALL RESPONSES TO PHQ QUESTIONS 1-9: 0
SUM OF ALL RESPONSES TO PHQ QUESTIONS 1-9: 0
2. FEELING DOWN, DEPRESSED OR HOPELESS: NOT AT ALL
SUM OF ALL RESPONSES TO PHQ QUESTIONS 1-9: 0
SUM OF ALL RESPONSES TO PHQ9 QUESTIONS 1 & 2: 0

## 2024-09-20 ASSESSMENT — LIFESTYLE VARIABLES
HOW OFTEN DO YOU HAVE A DRINK CONTAINING ALCOHOL: NEVER
HOW MANY STANDARD DRINKS CONTAINING ALCOHOL DO YOU HAVE ON A TYPICAL DAY: PATIENT DOES NOT DRINK

## 2024-09-23 ENCOUNTER — CLINICAL DOCUMENTATION (OUTPATIENT)
Dept: SPIRITUAL SERVICES | Age: 69
End: 2024-09-23

## 2024-09-23 LAB
LABORATORY COMMENT REPORT: NORMAL
PATH REPORT.FINAL DX SPEC: NORMAL
PATH REPORT.GROSS SPEC: NORMAL
PATH REPORT.RELEVANT HX SPEC: NORMAL
PATH REPORT.TOTAL CANCER: NORMAL

## 2024-10-02 ENCOUNTER — CLINICAL DOCUMENTATION (OUTPATIENT)
Dept: SPIRITUAL SERVICES | Age: 69
End: 2024-10-02

## 2024-10-02 NOTE — ACP (ADVANCE CARE PLANNING)
Advance Care Planning   Ambulatory ACP Specialist Patient Outreach    Date:  10/2/2024    ACP Specialist:  LAZARO Bass    Outreach call to patient in follow-up to ACP Specialist referral from:Pascual Aguirre MD    [x] PCP  [] Provider   [] Ambulatory Care Management [] Other     For:                  [x] Advance Directive Assistance              [] Complete Portable DNR order              [] Complete POST/POLST/MOST              [] Code Status Discussion             [] Discuss Goals of Care             [] Early ACP Decision-Making              [] Other (Specify)    Date Referral Received: 09/20/2024    Next Step:   [] ACP scheduled conversation  [x] Outreach again in one week               [] Email / Mail ACP Info Sheets  [] Email / Mail Advance Directive   [] Closing referral.  Routing closure to referring provider/staff and to ACP Specialist .    [] Closure letter mailed to patient with invitation to contact ACP Specialist if / when ready.   [] Other (Specify here):       [x] At this time, Healthcare Decision Maker Is:         [] Primary agent named in scanned advance directive.    [x] Legal Next of Kin.     [] Unable to determine legal decision maker at this time.    Outreaches:       [x] 1st -  Date:   10/02/2024               Intervention:  [] Spoke with Patient   [x] Left Voice mail [] Email / Mail    [] [employer]  [] Other (Specify) :     Outcomes: ACP specialist made pt's scheduled acp conversation appt phone call to pt's listed home/mobile number reflecting [phone]. Pt didn't answer this call, resulting in a VM left encouraging a return call if she would like to re-schedule this appt. If no return call is rcvd within 1 week, a f/u call will be made.            [] 2nd -  Date:                 Intervention:  [] Spoke with Patient  [] Left Voice mail [] Email / Mail    [] [employer]  [] Other (Specify) :              Outcomes:                [] 3rd -  Date:                Intervention:  []

## 2024-10-04 DIAGNOSIS — E78.5 HYPERLIPIDEMIA, UNSPECIFIED HYPERLIPIDEMIA TYPE: ICD-10-CM

## 2024-10-04 RX ORDER — SIMVASTATIN 40 MG
40 TABLET ORAL NIGHTLY
Qty: 90 TABLET | Refills: 3 | Status: SHIPPED | OUTPATIENT
Start: 2024-10-04

## 2024-10-14 ENCOUNTER — CLINICAL DOCUMENTATION (OUTPATIENT)
Dept: SPIRITUAL SERVICES | Age: 69
End: 2024-10-14

## 2024-10-14 NOTE — ACP (ADVANCE CARE PLANNING)
Advance Care Planning     Advance Care Planning Clinical Specialist  Conversation Note      Date of ACP Conversation: 10/14/2024    Conversation Conducted with: Patient with Decision Making Capacity    ACP Clinical Specialist: LAZARO Bass  Healthcare Decision Maker:     Current Designated Healthcare Decision Maker:     Primary Decision Maker: Dioni Soliz - Luisito - [phone]  Click here to complete Healthcare Decision Makers including section of the Healthcare Decision Maker Relationship (ie \"Primary\")  Today we completed the ACP conversation, nomination of HCDM's and completed pt's AD documents.     Care Preferences    Hospitalization:  \"If your health worsens and it becomes clear that your chance of recovery is unlikely, what would your preference be regarding hospitalization?\"    Choice:  [] The patient wants hospitalization.  [x] The patient prefers comfort-focused treatment without hospitalization.    Ventilation:  \"If you were in your present state of health and suddenly became very ill and were unable to breathe on your own, what would your preference be about the use of a ventilator (breathing machine) if it were available to you?\"      If the patient would desire the use of ventilator (breathing machine), answer \"yes\".  If not, \"no\": yes    \"If your health worsens and it becomes clear that your chance of recovery is unlikely, what would your preference be about the use of a ventilator (breathing machine) if it were available to you?\"     Would the patient desire the use of ventilator (breathing machine)?: No      Resuscitation  \"CPR works best to restart the heart when there is a sudden event, like a heart attack, in someone who is otherwise healthy. Unfortunately, CPR does not typically restart the heart for people who have serious health conditions or who are very sick.\"    \"In the event your heart stopped as a result of an underlying serious health condition, would you want attempts to be made

## 2025-01-19 DIAGNOSIS — E11.9 TYPE 2 DIABETES MELLITUS WITHOUT COMPLICATION, WITHOUT LONG-TERM CURRENT USE OF INSULIN (HCC): ICD-10-CM

## 2025-01-20 NOTE — TELEPHONE ENCOUNTER
Rx request   Requested Prescriptions     Pending Prescriptions Disp Refills    LANTUS SOLOSTAR 100 UNIT/ML injection pen [Pharmacy Med Name: LANTUS SOLOSTAR PEN INJ 3ML] 75 mL 10     Sig: INJECT 15 UNITS UNDER THE SKIN EVERY NIGHT AT BEDTIME     LOV 5/26/2023  Next Visit Date:  Future Appointments   Date Time Provider Department Center   2/7/2025  8:00 AM Pascual Aguirre MD [facility]

## 2025-01-21 RX ORDER — INSULIN GLARGINE 100 [IU]/ML
INJECTION, SOLUTION SUBCUTANEOUS
Qty: 75 ML | Refills: 5 | Status: SHIPPED | OUTPATIENT
Start: 2025-01-21

## 2025-01-29 ENCOUNTER — APPOINTMENT (OUTPATIENT)
Dept: RADIOLOGY | Facility: HOSPITAL | Age: 70
DRG: 193 | End: 2025-01-29
Payer: MEDICARE

## 2025-01-29 ENCOUNTER — HOSPITAL ENCOUNTER (INPATIENT)
Facility: HOSPITAL | Age: 70
DRG: 193 | End: 2025-01-29
Attending: STUDENT IN AN ORGANIZED HEALTH CARE EDUCATION/TRAINING PROGRAM | Admitting: STUDENT IN AN ORGANIZED HEALTH CARE EDUCATION/TRAINING PROGRAM
Payer: MEDICARE
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
## 2025-01-29 DIAGNOSIS — J10.1 INFLUENZA A: ICD-10-CM

## 2025-01-29 DIAGNOSIS — J96.21 ACUTE ON CHRONIC RESPIRATORY FAILURE WITH HYPOXIA (MULTI): ICD-10-CM

## 2025-01-29 DIAGNOSIS — S22.030A COMPRESSION FRACTURE OF T3 VERTEBRA, INITIAL ENCOUNTER: ICD-10-CM

## 2025-01-29 DIAGNOSIS — J44.1 COPD WITH ACUTE EXACERBATION (MULTI): ICD-10-CM

## 2025-01-29 LAB
ABO GROUP (TYPE) IN BLOOD: NORMAL
ALBUMIN SERPL BCP-MCNC: 4.1 G/DL (ref 3.4–5)
ALP SERPL-CCNC: 42 U/L (ref 33–136)
ALT SERPL W P-5'-P-CCNC: 24 U/L (ref 7–45)
ANION GAP SERPL CALC-SCNC: 13 MMOL/L (ref 10–20)
ANTIBODY SCREEN: NORMAL
AST SERPL W P-5'-P-CCNC: 44 U/L (ref 9–39)
BASOPHILS # BLD AUTO: 0.06 X10*3/UL (ref 0–0.1)
BASOPHILS NFR BLD AUTO: 0.9 %
BILIRUB SERPL-MCNC: 0.4 MG/DL (ref 0–1.2)
BUN SERPL-MCNC: 10 MG/DL (ref 6–23)
CALCIUM SERPL-MCNC: 8.6 MG/DL (ref 8.6–10.3)
CHLORIDE SERPL-SCNC: 96 MMOL/L (ref 98–107)
CO2 SERPL-SCNC: 30 MMOL/L (ref 21–32)
CREAT SERPL-MCNC: 0.77 MG/DL (ref 0.5–1.05)
EGFRCR SERPLBLD CKD-EPI 2021: 84 ML/MIN/1.73M*2
EOSINOPHIL # BLD AUTO: 0 X10*3/UL (ref 0–0.7)
EOSINOPHIL NFR BLD AUTO: 0 %
ERYTHROCYTE [DISTWIDTH] IN BLOOD BY AUTOMATED COUNT: 14.6 % (ref 11.5–14.5)
ETHANOL SERPL-MCNC: <10 MG/DL
FLUAV RNA RESP QL NAA+PROBE: DETECTED
FLUBV RNA RESP QL NAA+PROBE: NOT DETECTED
GLUCOSE SERPL-MCNC: 166 MG/DL (ref 74–99)
HCT VFR BLD AUTO: 48.9 % (ref 36–46)
HGB BLD-MCNC: 16.1 G/DL (ref 12–16)
HOLD SPECIMEN: NORMAL
IMM GRANULOCYTES # BLD AUTO: 0.05 X10*3/UL (ref 0–0.7)
IMM GRANULOCYTES NFR BLD AUTO: 0.7 % (ref 0–0.9)
INR PPP: 1.2 (ref 0.9–1.1)
LACTATE SERPL-SCNC: 0.8 MMOL/L (ref 0.4–2)
LACTATE SERPL-SCNC: 2.2 MMOL/L (ref 0.4–2)
LYMPHOCYTES # BLD AUTO: 1.19 X10*3/UL (ref 1.2–4.8)
LYMPHOCYTES NFR BLD AUTO: 17 %
MCH RBC QN AUTO: 32.5 PG (ref 26–34)
MCHC RBC AUTO-ENTMCNC: 32.9 G/DL (ref 32–36)
MCV RBC AUTO: 99 FL (ref 80–100)
MONOCYTES # BLD AUTO: 0.67 X10*3/UL (ref 0.1–1)
MONOCYTES NFR BLD AUTO: 9.6 %
NEUTROPHILS # BLD AUTO: 5.04 X10*3/UL (ref 1.2–7.7)
NEUTROPHILS NFR BLD AUTO: 71.8 %
NRBC BLD-RTO: 0 /100 WBCS (ref 0–0)
PLATELET # BLD AUTO: 200 X10*3/UL (ref 150–450)
POTASSIUM SERPL-SCNC: 3.9 MMOL/L (ref 3.5–5.3)
PROT SERPL-MCNC: 7.3 G/DL (ref 6.4–8.2)
PROTHROMBIN TIME: 13.1 SECONDS (ref 9.8–12.8)
RBC # BLD AUTO: 4.95 X10*6/UL (ref 4–5.2)
RH FACTOR (ANTIGEN D): NORMAL
SARS-COV-2 RNA RESP QL NAA+PROBE: NOT DETECTED
SODIUM SERPL-SCNC: 135 MMOL/L (ref 136–145)
WBC # BLD AUTO: 7 X10*3/UL (ref 4.4–11.3)

## 2025-01-29 PROCEDURE — 71045 X-RAY EXAM CHEST 1 VIEW: CPT | Performed by: RADIOLOGY

## 2025-01-29 PROCEDURE — 2500000004 HC RX 250 GENERAL PHARMACY W/ HCPCS (ALT 636 FOR OP/ED): Mod: JZ | Performed by: STUDENT IN AN ORGANIZED HEALTH CARE EDUCATION/TRAINING PROGRAM

## 2025-01-29 PROCEDURE — 99222 1ST HOSP IP/OBS MODERATE 55: CPT | Performed by: REGISTERED NURSE

## 2025-01-29 PROCEDURE — 72128 CT CHEST SPINE W/O DYE: CPT | Mod: RCN

## 2025-01-29 PROCEDURE — 71045 X-RAY EXAM CHEST 1 VIEW: CPT

## 2025-01-29 PROCEDURE — 87075 CULTR BACTERIA EXCEPT BLOOD: CPT | Mod: ELYLAB | Performed by: STUDENT IN AN ORGANIZED HEALTH CARE EDUCATION/TRAINING PROGRAM

## 2025-01-29 PROCEDURE — 85610 PROTHROMBIN TIME: CPT | Performed by: STUDENT IN AN ORGANIZED HEALTH CARE EDUCATION/TRAINING PROGRAM

## 2025-01-29 PROCEDURE — 85025 COMPLETE CBC W/AUTO DIFF WBC: CPT | Performed by: STUDENT IN AN ORGANIZED HEALTH CARE EDUCATION/TRAINING PROGRAM

## 2025-01-29 PROCEDURE — 70450 CT HEAD/BRAIN W/O DYE: CPT

## 2025-01-29 PROCEDURE — 2500000001 HC RX 250 WO HCPCS SELF ADMINISTERED DRUGS (ALT 637 FOR MEDICARE OP): Performed by: REGISTERED NURSE

## 2025-01-29 PROCEDURE — 36415 COLL VENOUS BLD VENIPUNCTURE: CPT | Performed by: STUDENT IN AN ORGANIZED HEALTH CARE EDUCATION/TRAINING PROGRAM

## 2025-01-29 PROCEDURE — 2500000005 HC RX 250 GENERAL PHARMACY W/O HCPCS: Performed by: STUDENT IN AN ORGANIZED HEALTH CARE EDUCATION/TRAINING PROGRAM

## 2025-01-29 PROCEDURE — 86901 BLOOD TYPING SEROLOGIC RH(D): CPT | Performed by: STUDENT IN AN ORGANIZED HEALTH CARE EDUCATION/TRAINING PROGRAM

## 2025-01-29 PROCEDURE — 2500000001 HC RX 250 WO HCPCS SELF ADMINISTERED DRUGS (ALT 637 FOR MEDICARE OP): Performed by: STUDENT IN AN ORGANIZED HEALTH CARE EDUCATION/TRAINING PROGRAM

## 2025-01-29 PROCEDURE — 2500000004 HC RX 250 GENERAL PHARMACY W/ HCPCS (ALT 636 FOR OP/ED): Performed by: REGISTERED NURSE

## 2025-01-29 PROCEDURE — 2500000002 HC RX 250 W HCPCS SELF ADMINISTERED DRUGS (ALT 637 FOR MEDICARE OP, ALT 636 FOR OP/ED): Performed by: REGISTERED NURSE

## 2025-01-29 PROCEDURE — 74177 CT ABD & PELVIS W/CONTRAST: CPT

## 2025-01-29 PROCEDURE — 84145 PROCALCITONIN (PCT): CPT | Mod: ELYLAB | Performed by: REGISTERED NURSE

## 2025-01-29 PROCEDURE — 87636 SARSCOV2 & INF A&B AMP PRB: CPT | Performed by: STUDENT IN AN ORGANIZED HEALTH CARE EDUCATION/TRAINING PROGRAM

## 2025-01-29 PROCEDURE — 82077 ASSAY SPEC XCP UR&BREATH IA: CPT | Performed by: STUDENT IN AN ORGANIZED HEALTH CARE EDUCATION/TRAINING PROGRAM

## 2025-01-29 PROCEDURE — 80053 COMPREHEN METABOLIC PANEL: CPT | Performed by: STUDENT IN AN ORGANIZED HEALTH CARE EDUCATION/TRAINING PROGRAM

## 2025-01-29 PROCEDURE — 2500000002 HC RX 250 W HCPCS SELF ADMINISTERED DRUGS (ALT 637 FOR MEDICARE OP, ALT 636 FOR OP/ED): Performed by: STUDENT IN AN ORGANIZED HEALTH CARE EDUCATION/TRAINING PROGRAM

## 2025-01-29 PROCEDURE — 72131 CT LUMBAR SPINE W/O DYE: CPT | Mod: RCN

## 2025-01-29 PROCEDURE — G0390 TRAUMA RESPONS W/HOSP CRITI: HCPCS

## 2025-01-29 PROCEDURE — 83605 ASSAY OF LACTIC ACID: CPT | Performed by: STUDENT IN AN ORGANIZED HEALTH CARE EDUCATION/TRAINING PROGRAM

## 2025-01-29 PROCEDURE — 99291 CRITICAL CARE FIRST HOUR: CPT | Mod: 25 | Performed by: STUDENT IN AN ORGANIZED HEALTH CARE EDUCATION/TRAINING PROGRAM

## 2025-01-29 PROCEDURE — 2550000001 HC RX 255 CONTRASTS: Performed by: STUDENT IN AN ORGANIZED HEALTH CARE EDUCATION/TRAINING PROGRAM

## 2025-01-29 PROCEDURE — 1210000001 HC SEMI-PRIVATE ROOM DAILY

## 2025-01-29 PROCEDURE — 96374 THER/PROPH/DIAG INJ IV PUSH: CPT

## 2025-01-29 PROCEDURE — 72125 CT NECK SPINE W/O DYE: CPT

## 2025-01-29 PROCEDURE — 94640 AIRWAY INHALATION TREATMENT: CPT

## 2025-01-29 RX ORDER — IPRATROPIUM BROMIDE AND ALBUTEROL SULFATE 2.5; .5 MG/3ML; MG/3ML
3 SOLUTION RESPIRATORY (INHALATION) ONCE
Status: COMPLETED | OUTPATIENT
Start: 2025-01-29 | End: 2025-01-29

## 2025-01-29 RX ORDER — SODIUM CHLORIDE 9 MG/ML
100 INJECTION, SOLUTION INTRAVENOUS CONTINUOUS
Status: DISCONTINUED | OUTPATIENT
Start: 2025-01-29 | End: 2025-01-30

## 2025-01-29 RX ORDER — DEXTROSE 50 % IN WATER (D50W) INTRAVENOUS SYRINGE
25
Status: DISCONTINUED | OUTPATIENT
Start: 2025-01-29 | End: 2025-02-04 | Stop reason: HOSPADM

## 2025-01-29 RX ORDER — IPRATROPIUM BROMIDE AND ALBUTEROL SULFATE 2.5; .5 MG/3ML; MG/3ML
3 SOLUTION RESPIRATORY (INHALATION)
Status: COMPLETED | OUTPATIENT
Start: 2025-01-29 | End: 2025-01-30

## 2025-01-29 RX ORDER — LISINOPRIL 5 MG/1
5 TABLET ORAL
Status: DISCONTINUED | OUTPATIENT
Start: 2025-01-30 | End: 2025-02-04 | Stop reason: HOSPADM

## 2025-01-29 RX ORDER — METFORMIN HYDROCHLORIDE 500 MG/1
1000 TABLET ORAL
Status: DISCONTINUED | OUTPATIENT
Start: 2025-01-30 | End: 2025-02-04 | Stop reason: HOSPADM

## 2025-01-29 RX ORDER — HEPARIN SODIUM 5000 [USP'U]/ML
5000 INJECTION, SOLUTION INTRAVENOUS; SUBCUTANEOUS EVERY 8 HOURS
Status: DISCONTINUED | OUTPATIENT
Start: 2025-01-29 | End: 2025-01-29

## 2025-01-29 RX ORDER — OSELTAMIVIR PHOSPHATE 75 MG/1
75 CAPSULE ORAL ONCE
Status: COMPLETED | OUTPATIENT
Start: 2025-01-29 | End: 2025-01-29

## 2025-01-29 RX ORDER — SIMVASTATIN 40 MG/1
40 TABLET, FILM COATED ORAL NIGHTLY
Status: DISCONTINUED | OUTPATIENT
Start: 2025-01-29 | End: 2025-02-04 | Stop reason: HOSPADM

## 2025-01-29 RX ORDER — LIDOCAINE 560 MG/1
1 PATCH PERCUTANEOUS; TOPICAL; TRANSDERMAL DAILY
Status: DISCONTINUED | OUTPATIENT
Start: 2025-01-29 | End: 2025-02-04 | Stop reason: HOSPADM

## 2025-01-29 RX ORDER — BENZONATATE 100 MG/1
100 CAPSULE ORAL 3 TIMES DAILY
Status: DISPENSED | OUTPATIENT
Start: 2025-01-29 | End: 2025-02-01

## 2025-01-29 RX ORDER — INSULIN LISPRO 100 [IU]/ML
0-10 INJECTION, SOLUTION INTRAVENOUS; SUBCUTANEOUS
Status: DISCONTINUED | OUTPATIENT
Start: 2025-01-30 | End: 2025-01-30

## 2025-01-29 RX ORDER — DICLOFENAC SODIUM 50 MG/1
1 TABLET, DELAYED RELEASE ORAL 2 TIMES DAILY PRN
COMMUNITY
Start: 2025-01-14

## 2025-01-29 RX ORDER — ENOXAPARIN SODIUM 100 MG/ML
40 INJECTION SUBCUTANEOUS DAILY
Status: DISCONTINUED | OUTPATIENT
Start: 2025-01-29 | End: 2025-02-04 | Stop reason: HOSPADM

## 2025-01-29 RX ORDER — DEXTROSE 50 % IN WATER (D50W) INTRAVENOUS SYRINGE
12.5
Status: DISCONTINUED | OUTPATIENT
Start: 2025-01-29 | End: 2025-02-04 | Stop reason: HOSPADM

## 2025-01-29 RX ORDER — POLYETHYLENE GLYCOL 3350 17 G/17G
17 POWDER, FOR SOLUTION ORAL DAILY
Status: DISCONTINUED | OUTPATIENT
Start: 2025-01-29 | End: 2025-02-04 | Stop reason: HOSPADM

## 2025-01-29 RX ORDER — IPRATROPIUM BROMIDE AND ALBUTEROL SULFATE 2.5; .5 MG/3ML; MG/3ML
3 SOLUTION RESPIRATORY (INHALATION) EVERY 6 HOURS PRN
Status: DISCONTINUED | OUTPATIENT
Start: 2025-01-29 | End: 2025-02-04 | Stop reason: HOSPADM

## 2025-01-29 RX ORDER — FLUTICASONE FUROATE AND VILANTEROL 100; 25 UG/1; UG/1
1 POWDER RESPIRATORY (INHALATION) DAILY
Status: DISCONTINUED | OUTPATIENT
Start: 2025-01-29 | End: 2025-02-04 | Stop reason: HOSPADM

## 2025-01-29 RX ORDER — ONDANSETRON 4 MG/1
4 TABLET, FILM COATED ORAL EVERY 8 HOURS PRN
Status: DISCONTINUED | OUTPATIENT
Start: 2025-01-29 | End: 2025-02-04 | Stop reason: HOSPADM

## 2025-01-29 RX ORDER — LISINOPRIL 5 MG/1
1 TABLET ORAL
COMMUNITY
Start: 2024-11-18

## 2025-01-29 RX ORDER — OSELTAMIVIR PHOSPHATE 75 MG/1
75 CAPSULE ORAL 2 TIMES DAILY
Status: DISPENSED | OUTPATIENT
Start: 2025-01-29 | End: 2025-02-03

## 2025-01-29 RX ORDER — ONDANSETRON HYDROCHLORIDE 2 MG/ML
4 INJECTION, SOLUTION INTRAVENOUS EVERY 8 HOURS PRN
Status: DISCONTINUED | OUTPATIENT
Start: 2025-01-29 | End: 2025-02-04 | Stop reason: HOSPADM

## 2025-01-29 RX ORDER — INSULIN GLARGINE 100 [IU]/ML
10 INJECTION, SOLUTION SUBCUTANEOUS NIGHTLY
Status: DISCONTINUED | OUTPATIENT
Start: 2025-01-29 | End: 2025-02-04 | Stop reason: HOSPADM

## 2025-01-29 RX ORDER — ACETAMINOPHEN 325 MG/1
975 TABLET ORAL ONCE
Status: COMPLETED | OUTPATIENT
Start: 2025-01-29 | End: 2025-01-29

## 2025-01-29 RX ORDER — OXYCODONE AND ACETAMINOPHEN 7.5; 325 MG/1; MG/1
0.5 TABLET ORAL EVERY 8 HOURS PRN
Status: DISCONTINUED | OUTPATIENT
Start: 2025-01-29 | End: 2025-02-04 | Stop reason: HOSPADM

## 2025-01-29 RX ADMIN — OSELTAMIVIR PHOSPHATE 75 MG: 75 CAPSULE ORAL at 23:40

## 2025-01-29 RX ADMIN — SIMVASTATIN 40 MG: 40 TABLET, FILM COATED ORAL at 23:40

## 2025-01-29 RX ADMIN — Medication 4 L/MIN: at 23:43

## 2025-01-29 RX ADMIN — Medication 5 L/MIN: at 12:02

## 2025-01-29 RX ADMIN — ACETAMINOPHEN 975 MG: 325 TABLET ORAL at 14:33

## 2025-01-29 RX ADMIN — BENZONATATE 100 MG: 100 CAPSULE ORAL at 23:40

## 2025-01-29 RX ADMIN — SODIUM CHLORIDE, POTASSIUM CHLORIDE, SODIUM LACTATE AND CALCIUM CHLORIDE 1000 ML: 600; 310; 30; 20 INJECTION, SOLUTION INTRAVENOUS at 11:56

## 2025-01-29 RX ADMIN — METHYLPREDNISOLONE SODIUM SUCCINATE 40 MG: 40 INJECTION, POWDER, FOR SOLUTION INTRAMUSCULAR; INTRAVENOUS at 23:40

## 2025-01-29 RX ADMIN — IOHEXOL 100 ML: 350 INJECTION, SOLUTION INTRAVENOUS at 11:53

## 2025-01-29 RX ADMIN — ENOXAPARIN SODIUM 40 MG: 40 INJECTION SUBCUTANEOUS at 23:40

## 2025-01-29 RX ADMIN — METHYLPREDNISOLONE SODIUM SUCCINATE 125 MG: 125 INJECTION, POWDER, FOR SOLUTION INTRAMUSCULAR; INTRAVENOUS at 14:30

## 2025-01-29 RX ADMIN — OSELTAMIVIR PHOSPHATE 75 MG: 75 CAPSULE ORAL at 15:20

## 2025-01-29 RX ADMIN — IPRATROPIUM BROMIDE AND ALBUTEROL SULFATE 3 ML: 2.5; .5 SOLUTION RESPIRATORY (INHALATION) at 21:49

## 2025-01-29 RX ADMIN — IPRATROPIUM BROMIDE AND ALBUTEROL SULFATE 3 ML: .5; 3 SOLUTION RESPIRATORY (INHALATION) at 12:02

## 2025-01-29 RX ADMIN — IPRATROPIUM BROMIDE AND ALBUTEROL SULFATE 3 ML: .5; 3 SOLUTION RESPIRATORY (INHALATION) at 14:22

## 2025-01-29 SDOH — SOCIAL STABILITY: SOCIAL INSECURITY: HAVE YOU HAD THOUGHTS OF HARMING ANYONE ELSE?: NO

## 2025-01-29 SDOH — SOCIAL STABILITY: SOCIAL NETWORK: IN A TYPICAL WEEK, HOW MANY TIMES DO YOU TALK ON THE PHONE WITH FAMILY, FRIENDS, OR NEIGHBORS?: PATIENT DECLINED

## 2025-01-29 SDOH — SOCIAL STABILITY: SOCIAL INSECURITY
WITHIN THE LAST YEAR, HAVE YOU BEEN KICKED, HIT, SLAPPED, OR OTHERWISE PHYSICALLY HURT BY YOUR PARTNER OR EX-PARTNER?: PATIENT DECLINED

## 2025-01-29 SDOH — ECONOMIC STABILITY: FOOD INSECURITY
WITHIN THE PAST 12 MONTHS, YOU WORRIED THAT YOUR FOOD WOULD RUN OUT BEFORE YOU GOT THE MONEY TO BUY MORE.: PATIENT DECLINED

## 2025-01-29 SDOH — ECONOMIC STABILITY: FOOD INSECURITY: WITHIN THE PAST 12 MONTHS, THE FOOD YOU BOUGHT JUST DIDN'T LAST AND YOU DIDN'T HAVE MONEY TO GET MORE.: PATIENT DECLINED

## 2025-01-29 SDOH — SOCIAL STABILITY: SOCIAL NETWORK
DO YOU BELONG TO ANY CLUBS OR ORGANIZATIONS SUCH AS CHURCH GROUPS, UNIONS, FRATERNAL OR ATHLETIC GROUPS, OR SCHOOL GROUPS?: PATIENT DECLINED

## 2025-01-29 SDOH — ECONOMIC STABILITY: TRANSPORTATION INSECURITY
IN THE PAST 12 MONTHS, HAS LACK OF TRANSPORTATION KEPT YOU FROM MEDICAL APPOINTMENTS OR FROM GETTING MEDICATIONS?: PATIENT DECLINED

## 2025-01-29 SDOH — SOCIAL STABILITY: SOCIAL INSECURITY: ARE YOU MARRIED, WIDOWED, DIVORCED, SEPARATED, NEVER MARRIED, OR LIVING WITH A PARTNER?: PATIENT DECLINED

## 2025-01-29 SDOH — HEALTH STABILITY: PHYSICAL HEALTH
HOW OFTEN DO YOU NEED TO HAVE SOMEONE HELP YOU WHEN YOU READ INSTRUCTIONS, PAMPHLETS, OR OTHER WRITTEN MATERIAL FROM YOUR DOCTOR OR PHARMACY?: SOMETIMES

## 2025-01-29 SDOH — SOCIAL STABILITY: SOCIAL NETWORK: HOW OFTEN DO YOU ATTEND MEETINGS OF THE CLUBS OR ORGANIZATIONS YOU BELONG TO?: PATIENT DECLINED

## 2025-01-29 SDOH — HEALTH STABILITY: PHYSICAL HEALTH: ON AVERAGE, HOW MANY MINUTES DO YOU ENGAGE IN EXERCISE AT THIS LEVEL?: 0 MIN

## 2025-01-29 SDOH — SOCIAL STABILITY: SOCIAL INSECURITY: ARE YOU OR HAVE YOU BEEN THREATENED OR ABUSED PHYSICALLY, EMOTIONALLY, OR SEXUALLY BY ANYONE?: NO

## 2025-01-29 SDOH — SOCIAL STABILITY: SOCIAL INSECURITY: DO YOU FEEL UNSAFE GOING BACK TO THE PLACE WHERE YOU ARE LIVING?: NO

## 2025-01-29 SDOH — HEALTH STABILITY: MENTAL HEALTH: HOW OFTEN DO YOU HAVE SIX OR MORE DRINKS ON ONE OCCASION?: PATIENT DECLINED

## 2025-01-29 SDOH — HEALTH STABILITY: PHYSICAL HEALTH: ON AVERAGE, HOW MANY DAYS PER WEEK DO YOU ENGAGE IN MODERATE TO STRENUOUS EXERCISE (LIKE A BRISK WALK)?: 0 DAYS

## 2025-01-29 SDOH — SOCIAL STABILITY: SOCIAL INSECURITY: ARE THERE ANY APPARENT SIGNS OF INJURIES/BEHAVIORS THAT COULD BE RELATED TO ABUSE/NEGLECT?: NO

## 2025-01-29 SDOH — ECONOMIC STABILITY: HOUSING INSECURITY: IN THE PAST 12 MONTHS, HOW MANY TIMES HAVE YOU MOVED WHERE YOU WERE LIVING?: 0

## 2025-01-29 SDOH — SOCIAL STABILITY: SOCIAL INSECURITY: DO YOU FEEL ANYONE HAS EXPLOITED OR TAKEN ADVANTAGE OF YOU FINANCIALLY OR OF YOUR PERSONAL PROPERTY?: NO

## 2025-01-29 SDOH — ECONOMIC STABILITY: INCOME INSECURITY
IN THE PAST 12 MONTHS HAS THE ELECTRIC, GAS, OIL, OR WATER COMPANY THREATENED TO SHUT OFF SERVICES IN YOUR HOME?: PATIENT DECLINED

## 2025-01-29 SDOH — SOCIAL STABILITY: SOCIAL NETWORK: HOW OFTEN DO YOU ATTEND CHURCH OR RELIGIOUS SERVICES?: PATIENT DECLINED

## 2025-01-29 SDOH — ECONOMIC STABILITY: HOUSING INSECURITY: IN THE LAST 12 MONTHS, WAS THERE A TIME WHEN YOU WERE NOT ABLE TO PAY THE MORTGAGE OR RENT ON TIME?: PATIENT DECLINED

## 2025-01-29 SDOH — SOCIAL STABILITY: SOCIAL INSECURITY: WITHIN THE LAST YEAR, HAVE YOU BEEN AFRAID OF YOUR PARTNER OR EX-PARTNER?: PATIENT DECLINED

## 2025-01-29 SDOH — SOCIAL STABILITY: SOCIAL INSECURITY
WITHIN THE LAST YEAR, HAVE YOU BEEN HUMILIATED OR EMOTIONALLY ABUSED IN OTHER WAYS BY YOUR PARTNER OR EX-PARTNER?: PATIENT DECLINED

## 2025-01-29 SDOH — SOCIAL STABILITY: SOCIAL INSECURITY
WITHIN THE LAST YEAR, HAVE YOU BEEN RAPED OR FORCED TO HAVE ANY KIND OF SEXUAL ACTIVITY BY YOUR PARTNER OR EX-PARTNER?: PATIENT DECLINED

## 2025-01-29 SDOH — SOCIAL STABILITY: SOCIAL INSECURITY: ABUSE: ADULT

## 2025-01-29 SDOH — SOCIAL STABILITY: SOCIAL NETWORK: HOW OFTEN DO YOU GET TOGETHER WITH FRIENDS OR RELATIVES?: PATIENT DECLINED

## 2025-01-29 SDOH — SOCIAL STABILITY: SOCIAL INSECURITY: WERE YOU ABLE TO COMPLETE ALL THE BEHAVIORAL HEALTH SCREENINGS?: YES

## 2025-01-29 SDOH — ECONOMIC STABILITY: FOOD INSECURITY: HOW HARD IS IT FOR YOU TO PAY FOR THE VERY BASICS LIKE FOOD, HOUSING, MEDICAL CARE, AND HEATING?: PATIENT DECLINED

## 2025-01-29 SDOH — ECONOMIC STABILITY: HOUSING INSECURITY: AT ANY TIME IN THE PAST 12 MONTHS, WERE YOU HOMELESS OR LIVING IN A SHELTER (INCLUDING NOW)?: PATIENT DECLINED

## 2025-01-29 SDOH — SOCIAL STABILITY: SOCIAL INSECURITY: HAVE YOU HAD ANY THOUGHTS OF HARMING ANYONE ELSE?: NO

## 2025-01-29 SDOH — SOCIAL STABILITY: SOCIAL INSECURITY: DOES ANYONE TRY TO KEEP YOU FROM HAVING/CONTACTING OTHER FRIENDS OR DOING THINGS OUTSIDE YOUR HOME?: NO

## 2025-01-29 SDOH — HEALTH STABILITY: MENTAL HEALTH
DO YOU FEEL STRESS - TENSE, RESTLESS, NERVOUS, OR ANXIOUS, OR UNABLE TO SLEEP AT NIGHT BECAUSE YOUR MIND IS TROUBLED ALL THE TIME - THESE DAYS?: PATIENT DECLINED

## 2025-01-29 SDOH — SOCIAL STABILITY: SOCIAL INSECURITY: HAS ANYONE EVER THREATENED TO HURT YOUR FAMILY OR YOUR PETS?: NO

## 2025-01-29 SDOH — HEALTH STABILITY: MENTAL HEALTH: HOW OFTEN DO YOU HAVE A DRINK CONTAINING ALCOHOL?: PATIENT DECLINED

## 2025-01-29 SDOH — HEALTH STABILITY: MENTAL HEALTH: HOW MANY DRINKS CONTAINING ALCOHOL DO YOU HAVE ON A TYPICAL DAY WHEN YOU ARE DRINKING?: PATIENT DECLINED

## 2025-01-29 ASSESSMENT — COGNITIVE AND FUNCTIONAL STATUS - GENERAL
MOBILITY SCORE: 12
PERSONAL GROOMING: A LITTLE
PATIENT BASELINE BEDBOUND: NO
DAILY ACTIVITIY SCORE: 16
TOILETING: A LITTLE
MOVING TO AND FROM BED TO CHAIR: A LOT
CLIMB 3 TO 5 STEPS WITH RAILING: TOTAL
DRESSING REGULAR UPPER BODY CLOTHING: A LITTLE
WALKING IN HOSPITAL ROOM: A LOT
EATING MEALS: A LITTLE
STANDING UP FROM CHAIR USING ARMS: A LOT
HELP NEEDED FOR BATHING: A LOT
DRESSING REGULAR LOWER BODY CLOTHING: A LOT
MOVING FROM LYING ON BACK TO SITTING ON SIDE OF FLAT BED WITH BEDRAILS: A LITTLE
TURNING FROM BACK TO SIDE WHILE IN FLAT BAD: A LOT

## 2025-01-29 ASSESSMENT — PAIN DESCRIPTION - DESCRIPTORS
DESCRIPTORS: SHARP
DESCRIPTORS: ACHING

## 2025-01-29 ASSESSMENT — LIFESTYLE VARIABLES
TOTAL SCORE: 0
EVER HAD A DRINK FIRST THING IN THE MORNING TO STEADY YOUR NERVES TO GET RID OF A HANGOVER: NO
AUDIT-C TOTAL SCORE: -1
SKIP TO QUESTIONS 9-10: 0
HAVE YOU EVER FELT YOU SHOULD CUT DOWN ON YOUR DRINKING: NO
HOW MANY STANDARD DRINKS CONTAINING ALCOHOL DO YOU HAVE ON A TYPICAL DAY: PATIENT DECLINED
SKIP TO QUESTIONS 9-10: 0
HOW OFTEN DO YOU HAVE A DRINK CONTAINING ALCOHOL: PATIENT DECLINED
PRESCIPTION_ABUSE_PAST_12_MONTHS: NO
HOW OFTEN DO YOU HAVE 6 OR MORE DRINKS ON ONE OCCASION: PATIENT DECLINED
HAVE PEOPLE ANNOYED YOU BY CRITICIZING YOUR DRINKING: NO
AUDIT-C TOTAL SCORE: -1
EVER FELT BAD OR GUILTY ABOUT YOUR DRINKING: NO
SUBSTANCE_ABUSE_PAST_12_MONTHS: NO
AUDIT-C TOTAL SCORE: -1

## 2025-01-29 ASSESSMENT — ACTIVITIES OF DAILY LIVING (ADL)
GROOMING: NEEDS ASSISTANCE
LACK_OF_TRANSPORTATION: PATIENT DECLINED
HEARING - RIGHT EAR: DIFFICULTY WITH NOISE
BATHING: NEEDS ASSISTANCE
WALKS IN HOME: NEEDS ASSISTANCE
FEEDING YOURSELF: INDEPENDENT
PATIENT'S MEMORY ADEQUATE TO SAFELY COMPLETE DAILY ACTIVITIES?: NO
HEARING - LEFT EAR: DIFFICULTY WITH NOISE
ADEQUATE_TO_COMPLETE_ADL: YES
LACK_OF_TRANSPORTATION: PATIENT DECLINED
TOILETING: NEEDS ASSISTANCE
DRESSING YOURSELF: NEEDS ASSISTANCE
JUDGMENT_ADEQUATE_SAFELY_COMPLETE_DAILY_ACTIVITIES: NO

## 2025-01-29 ASSESSMENT — PAIN DESCRIPTION - LOCATION: LOCATION: BACK

## 2025-01-29 ASSESSMENT — PATIENT HEALTH QUESTIONNAIRE - PHQ9
SUM OF ALL RESPONSES TO PHQ9 QUESTIONS 1 & 2: 0
1. LITTLE INTEREST OR PLEASURE IN DOING THINGS: NOT AT ALL
2. FEELING DOWN, DEPRESSED OR HOPELESS: NOT AT ALL

## 2025-01-29 ASSESSMENT — PAIN DESCRIPTION - ORIENTATION: ORIENTATION: MID

## 2025-01-29 ASSESSMENT — PAIN SCALES - GENERAL
PAINLEVEL_OUTOF10: 5 - MODERATE PAIN
PAINLEVEL_OUTOF10: 7

## 2025-01-29 ASSESSMENT — PAIN - FUNCTIONAL ASSESSMENT
PAIN_FUNCTIONAL_ASSESSMENT: 0-10
PAIN_FUNCTIONAL_ASSESSMENT: 0-10

## 2025-01-29 ASSESSMENT — PAIN DESCRIPTION - FREQUENCY: FREQUENCY: CONSTANT/CONTINUOUS

## 2025-01-29 ASSESSMENT — PAIN DESCRIPTION - PAIN TYPE: TYPE: ACUTE PAIN

## 2025-01-29 NOTE — ED PROVIDER NOTES
HPI   Chief Complaint   Patient presents with    Fall         History provided by:  EMS personnel and patient    70-year-old female brought in by EMS from home for evaluation after fall that occurred yesterday.  Patient was outside yesterday evening had a mechanical fall was able to crawl back into the home.  Normally ambulates with a walker.  Having difficulty ambulating today.  Having significant pain in the mid back.  History of COPD with chronic respiratory failure on 2 L at baseline.  Denies any chest pain, abdominal pain, hip pain.  Unsure about head trauma or LOC.  Denies anticoagulation.      Patient History   Past Medical History:   Diagnosis Date    Acute respiratory failure with hypercapnia (Multi) 10/10/2013    Arthritis     Chronic pain disorder     Congestion of upper airway     COPD (chronic obstructive pulmonary disease) (Multi)     Coronary artery disease     Diverticulosis     Dysfunctional uterine bleeding     Hyperlipidemia     Hypertension     Lumbar disc disease     Oxygen dependent     PRN 2L    Type 2 diabetes mellitus (Multi)      Past Surgical History:   Procedure Laterality Date    BOWEL RESECTION      COLON RESECTION-DIVERTICULITIS    CARPAL TUNNEL RELEASE Right     COLONOSCOPY      CORONARY ANGIOPLASTY WITH STENT PLACEMENT      DILATION AND CURETTAGE OF UTERUS      HERNIA REPAIR      HIP ARTHROPLASTY Right     HYSTERECTOMY       No family history on file.  Social History     Tobacco Use    Smoking status: Every Day     Types: Cigarettes    Smokeless tobacco: Never   Vaping Use    Vaping status: Never Used   Substance Use Topics    Alcohol use: Never    Drug use: Never       Physical Exam   ED Triage Vitals [01/29/25 1122]   Temperature Heart Rate Respirations BP   (!) 38 °C (100.4 °F) (!) 112 (!) 21 (!) 185/81      Pulse Ox Temp Source Heart Rate Source Patient Position   (!) 93 % Oral Monitor --      BP Location FiO2 (%)     -- --       Physical Exam  Vitals and nursing note reviewed.    Constitutional:       General: She is not in acute distress.  HENT:      Head: Atraumatic.      Mouth/Throat:      Mouth: Mucous membranes are moist.      Pharynx: Oropharynx is clear.   Eyes:      Extraocular Movements: Extraocular movements intact.      Conjunctiva/sclera: Conjunctivae normal.      Pupils: Pupils are equal, round, and reactive to light.   Cardiovascular:      Rate and Rhythm: Regular rhythm. Tachycardia present.      Pulses: Normal pulses.   Pulmonary:      Effort: Tachypnea present. No respiratory distress.      Breath sounds: Decreased air movement present. Wheezing present.   Abdominal:      General: There is no distension.      Palpations: Abdomen is soft.      Tenderness: There is no abdominal tenderness. There is no guarding or rebound.   Musculoskeletal:         General: No deformity.      Cervical back: Neck supple. No tenderness.      Comments: Lower thoracic tenderness on exam, no step-offs or deformities.   Skin:     General: Skin is warm and dry.   Neurological:      Mental Status: She is alert and oriented to person, place, and time. Mental status is at baseline.      Cranial Nerves: No cranial nerve deficit.      Sensory: No sensory deficit.      Motor: No weakness.   Psychiatric:         Mood and Affect: Mood normal.         Behavior: Behavior normal.           ED Course & MDM   Diagnoses as of 01/29/25 1529   Fall, initial encounter   Compression fracture of T3 vertebra, initial encounter   COPD with acute exacerbation (Multi)   Influenza A   Acute on chronic respiratory failure with hypoxia (Multi)                 No data recorded                         Labs Reviewed   CBC WITH AUTO DIFFERENTIAL - Abnormal       Result Value    WBC 7.0      nRBC 0.0      RBC 4.95      Hemoglobin 16.1 (*)     Hematocrit 48.9 (*)     MCV 99      MCH 32.5      MCHC 32.9      RDW 14.6 (*)     Platelets 200      Neutrophils % 71.8      Immature Granulocytes %, Automated 0.7      Lymphocytes % 17.0       Monocytes % 9.6      Eosinophils % 0.0      Basophils % 0.9      Neutrophils Absolute 5.04      Immature Granulocytes Absolute, Automated 0.05      Lymphocytes Absolute 1.19 (*)     Monocytes Absolute 0.67      Eosinophils Absolute 0.00      Basophils Absolute 0.06     COMPREHENSIVE METABOLIC PANEL - Abnormal    Glucose 166 (*)     Sodium 135 (*)     Potassium 3.9      Chloride 96 (*)     Bicarbonate 30      Anion Gap 13      Urea Nitrogen 10      Creatinine 0.77      eGFR 84      Calcium 8.6      Albumin 4.1      Alkaline Phosphatase 42      Total Protein 7.3      AST 44 (*)     Bilirubin, Total 0.4      ALT 24     LACTATE - Abnormal    Lactate 2.2 (*)     Narrative:     Venipuncture immediately after or during the administration of Metamizole may lead to falsely low results. Testing should be performed immediately prior to Metamizole dosing.   PROTIME-INR - Abnormal    Protime 13.1 (*)     INR 1.2 (*)    INFLUENZA A AND B PCR - Abnormal    Flu A Result Detected (*)     Flu B Result Not Detected      Narrative:     This assay is an in vitro diagnostic multiplex nucleic acid amplification test for the detection and discrimination of Influenza A & B from nasopharyngeal specimens, and has been validated for use at [facility]. Negative results do not preclude Influenza A/B infections, and should not be used as the sole basis for diagnosis, treatment, or other management decisions. If Influenza A/B and RSV PCR results are negative, testing for Parainfluenza virus, Adenovirus and Metapneumovirus is routinely performed for [facility] pediatric oncology and intensive care inpatients, and is available on other patients by placing an add-on request.   ALCOHOL - Normal    Alcohol <10     SARS-COV-2 PCR - Normal    Coronavirus 2019, PCR Not Detected      Narrative:     This assay is an FDA-cleared, in vitro diagnostic nucleic acid amplification test for the qualitative detection and differentiation of SARS  CoV-2 from nasopharyngeal specimens collected from individuals with signs and symptoms of respiratory tract infections, and has been validated for use at [facility]. Negative results do not preclude COVID-19 infections and should not be used as the sole basis for diagnosis, treatment, or other management decisions. Testing for SARS CoV-2 is recommended only for patients who meet current clinical and/or epidemiological criteria defined by federal, state, or local public health directives.   BLOOD CULTURE   BLOOD CULTURE   TYPE AND SCREEN    ABO TYPE O      Rh TYPE NEG      ANTIBODY SCREEN NEG     URINALYSIS WITH REFLEX CULTURE AND MICROSCOPIC    Narrative:     The following orders were created for panel order Urinalysis with Reflex Culture and Microscopic.  Procedure                               Abnormality         Status                     ---------                               -----------         ------                     Urinalysis with Reflex C...[[id number]]                                                 Extra Urine Gray Tube[[id number]]                                                         Please view results for these tests on the individual orders.   URINALYSIS WITH REFLEX CULTURE AND MICROSCOPIC   EXTRA URINE GRAY TUBE   LACTATE   BLOOD GAS VENOUS     CT thoracic spine wo IV contrast   Final Result   Grade 1 T3 vertebral body compression deformity without retropulsion   into the spinal canal, of uncertain chronicity. No other traumatic   abnormality of the thoracic and lumbar spine.        Findings were communicated with Dr. Huynh by Dr. Kizzy Childers via   phone at 12:09 pm on 1/29/2025 with read back verification.        MACRO:   None        Signed by: Kizzy Childers 1/29/2025 12:12 PM   Dictation workstation:   [id number]      CT lumbar spine wo IV contrast   Final Result   Grade 1 T3 vertebral body compression deformity without retropulsion   into the spinal canal, of  uncertain chronicity. No other traumatic   abnormality of the thoracic and lumbar spine.        Findings were communicated with Dr. Huynh by Dr. Kizzy Childers via   phone at 12:09 pm on 1/29/2025 with read back verification.        MACRO:   None        Signed by: Kizzy Childers 1/29/2025 12:12 PM   Dictation workstation:   [id number]      CT chest abdomen pelvis w IV contrast   Final Result   1. No acute traumatic abnormality in the chest, abdomen and pelvis.   2. Clustered peribronchovascular ground-glass to consolidative   opacities seen in the posterior right upper lobe, likely   bronchiolitis. Follow-up CT chest recommended in 3 months to ensure   resolution.   3. Hepatic steatosis, please correlate with liver function tests.        Findings were communicated with Dr. Huynh by Dr. Kizzy Childers via   phone at 12:09 pm on 1/29/2025 with read back verification.        MACRO:   None        Signed by: Kizzy Childers 1/29/2025 12:13 PM   Dictation workstation:   [id number]      CT head W O contrast trauma protocol   Final Result   Addendum (preliminary) 1 of 1   Interpreted By:  Kizzy Childers,    ADDENDUM:   Findings were communicated with Dr. Huynh by Dr. Kizzy Childers via   phone at 12:09 pm on 1/29/2025 with read back verification.        Signed by: Kizzy Childers 1/29/2025 12:13 PM        -------- ORIGINAL REPORT --------   Dictation workstation:   [id number]      Final   1. No acute intracranial abnormality.   2. Within limitations of patient motion, there is no acute fracture   or traumatic malalignment of the cervical spine.        MACRO:   None        Signed by: Kizzy Childers 1/29/2025 11:57 AM   Dictation workstation:   [id number]      CT cervical spine wo IV contrast   Final Result   Addendum (preliminary) 1 of 1   Interpreted By:  Kizzy Childers,    ADDENDUM:   Findings were communicated with Dr. Huynh by Dr. Kizzy Childers via   phone at 12:09 pm on 1/29/2025 with read back verification.         Signed by: Kizzy Childers 1/29/2025 12:13 PM        -------- ORIGINAL REPORT --------   Dictation workstation:   [id number]      Final   1. No acute intracranial abnormality.   2. Within limitations of patient motion, there is no acute fracture   or traumatic malalignment of the cervical spine.        MACRO:   None        Signed by: Kizzy Childers 1/29/2025 11:57 AM   Dictation workstation:   [id number]      XR chest 1 view   Final Result   No definite acute cardiopulmonary process radiographically.        MACRO:   None.        Signed by: Kamala Vallejo 1/29/2025 11:38 AM   Dictation workstation:   [id number]                Medical Decision Making  Amount and/or Complexity of Data Reviewed  ECG/medicine tests: ordered and independent interpretation performed. Decision-making details documented in ED Course.     Details: Twelve-lead ECG obtained at 1206, interpretation demonstrates sinus tachycardia with a rate of 110, no acute ST elevation or depression.    70-year-old female who presented as limited trauma activation after fall that occurred yesterday.  No anticoagulation.  Awake and alert upon arrival to the ED.  Complains of mid back pain.  Generally weak throughout.  Normal sensation.  Patient is tachycardic and tachypneic with expiratory wheezing and diminished breath sounds bilaterally.  History of COPD.  Patient requiring increased supplemental O2 via nasal cannula.  Currently on 4 L nasal cannula.  Patient found to be febrile at 38 °C.  Blood cultures are sent and viral swabs obtained along with patient's trauma workup.  Patient treated with acetaminophen for fever.  Patient's chest x-ray reviewed without focal infiltrate.  No pneumothorax.  CT head negative for intracranial hemorrhage.  No C-spine fractures on CT imaging of the C-spine.  Compression deformity of T3 without significant retropulsion.  No other acute findings in the chest/abdomen/pelvis.  No lumbar fractures.  Patient's labs were  reviewed.  No leukocytosis, lactic acid 2.2.  Influenza A positive on PCR testing.  This likely explains patient's fever and respiratory symptoms.  Patient was given Solu-Medrol IV and additional DuoNeb treatment for COPD exacerbation likely secondary to influenza A.  Started on oseltamavir.  Plan to admit for continued management.  Case discussed with hospitalist team.    Procedure  Critical Care    Performed by: Scott Huynh MD  Authorized by: Scott Huynh MD    Critical care provider statement:     Critical care time (minutes):  36    Critical care time was exclusive of:  Separately billable procedures and treating other patients    Critical care was necessary to treat or prevent imminent or life-threatening deterioration of the following conditions:  Trauma and respiratory failure    Critical care was time spent personally by me on the following activities:  Re-evaluation of patient's condition, ordering and review of radiographic studies, ordering and review of laboratory studies, ordering and performing treatments and interventions, pulse oximetry, obtaining history from patient or surrogate, examination of patient and evaluation of patient's response to treatment    Care discussed with: admitting provider         Scott Huynh MD  01/29/25 2970

## 2025-01-29 NOTE — H&P
History Of Present Illness  Estrella Melton is a 70 y.o. female PMHx: Chronic respiratory failure, COPD, former smoker, hypertension, HLD, type 2 diabetes on insulin presented with status post mechanical fall yesterday, difficulty ambulating, and general malaise increased shortness of breath.  She stated she was on her porch and a mechanical fall.  Normally walks with walker.  Having difficulty ambulating today with significant pain in the back.  Patient required additional oxygen at 4 L nasal cannula she was hypoxic on admission.  Normally wears 2 L at home for COPD.    ER course: Grade 1 T3 vertebral compression deformity.  Per Dr. Huynh spoke with trauma team.  They did not feel there is any treatment at this time.    CT abdomen pelvis showed groundglass consolidative opacities in the posterior right upper lobe.  Likely bronchiolitis.  Recommend follow-up CT chest in 3 months.  Hepatic steatosis.  CT of head was negative.  CT of cervical spine negative, chest x-ray negative. Pt given Tamiflu, tylenol, 1L bolus and soldumedrol with improved symptoms.     Patient examined and seen. Alert and oriented x3, resting appears acutely ill. Patient denies chest pain, , palpitations, abdominal pain, fever or chills.  She does report general malaise, increased shortness of breath and mid lumbar pain with palpation. She states her cough is dry, no sputum production, no fever. She fell yesterday on her porch and was able to get back into the house. She does wear 2L N/C at all times and quit smoking 2 weeks ago. She denies pain elsewhere and denies any significant cardiac history, no chest pain.       Past Medical History  She has a past medical history of Acute respiratory failure with hypercapnia (Multi) (10/10/2013), Arthritis, Chronic pain disorder, Congestion of upper airway, COPD (chronic obstructive pulmonary disease) (Multi), Coronary artery disease, Diverticulosis, Dysfunctional uterine bleeding, Hyperlipidemia,  Hypertension, Lumbar disc disease, Oxygen dependent, and Type 2 diabetes mellitus (Multi).  Psoriasis     Surgical History  She has a past surgical history that includes Bowel resection; Hernia repair; Colonoscopy; Hysterectomy; Hip Arthroplasty (Right); Coronary angioplasty with stent; Dilation and curettage of uterus; and Carpal tunnel release (Right).     Social History  She reports that she has been smoking cigarettes. She has never used smokeless tobacco. She reports that she does not drink alcohol and does not use drugs.    Family History  Reviewed, not pertinent to admission      Allergies  Canagliflozin, Hydrocodone-acetaminophen, Hydrocodone-guaifenesin, Penicillin, Strawberry, Strawberry extract, and Codeine         Physical Exam   Constitutional: acutely ill and discheveled, awake/alert/oriented x3, , cooperative  Eyes: PERRL, EOMI, clear sclera  ENMT: mucous membranes dry, no apparent injury, no lesions seen  Head/Neck: Neck supple, no apparent injury, thyroid without mass or tenderness  Respiratory/Thorax: Patent airways,  breath sounds with inspiratory and expiratory wheezing heard throughout lobes   Cardiovascular: Regular, rate and rhythm, no murmurs, 2+ equal pulses of the extremities, normal S 1and S 2  Gastrointestinal: Nondistended, soft, non-tender,  +BS x 4 quadrants  Genitourinary: voiding freely without CVA tenderness or suprabupic tenderness   Musculoskeletal: ROM intact, tenderness to mid back with palpation, no ecchymosis identified   Extremities: normal extremities,  no contusions or wounds seen moves all extremities equal strength to upper and lower, no loss of bowel or bladder function   Skin: warm, dry, intact  Neurological: alert/oriented x 3, speech clear,   Psychiatric: appropriate mood and behavior    Last Recorded Vitals  /64   Pulse (!) 106   Temp 37.4 °C (99.3 °F) (Temporal)   Resp (!) 31   Wt 94.3 kg (208 lb)   SpO2 (!) 90%       Relevant Results    Medications are  currently signed and held   Scheduled medications  oxygen, , inhalation, Continuous - Inhalation      Continuous medications     PRN medications      Results for orders placed or performed during the hospital encounter of 01/29/25 (from the past 24 hours)   CBC and Auto Differential   Result Value Ref Range    WBC 7.0 4.4 - 11.3 x10*3/uL    nRBC 0.0 0.0 - 0.0 /100 WBCs    RBC 4.95 4.00 - 5.20 x10*6/uL    Hemoglobin 16.1 (H) 12.0 - 16.0 g/dL    Hematocrit 48.9 (H) 36.0 - 46.0 %    MCV 99 80 - 100 fL    MCH 32.5 26.0 - 34.0 pg    MCHC 32.9 32.0 - 36.0 g/dL    RDW 14.6 (H) 11.5 - 14.5 %    Platelets 200 150 - 450 x10*3/uL    Neutrophils % 71.8 40.0 - 80.0 %    Immature Granulocytes %, Automated 0.7 0.0 - 0.9 %    Lymphocytes % 17.0 13.0 - 44.0 %    Monocytes % 9.6 2.0 - 10.0 %    Eosinophils % 0.0 0.0 - 6.0 %    Basophils % 0.9 0.0 - 2.0 %    Neutrophils Absolute 5.04 1.20 - 7.70 x10*3/uL    Immature Granulocytes Absolute, Automated 0.05 0.00 - 0.70 x10*3/uL    Lymphocytes Absolute 1.19 (L) 1.20 - 4.80 x10*3/uL    Monocytes Absolute 0.67 0.10 - 1.00 x10*3/uL    Eosinophils Absolute 0.00 0.00 - 0.70 x10*3/uL    Basophils Absolute 0.06 0.00 - 0.10 x10*3/uL   Comprehensive Metabolic Panel   Result Value Ref Range    Glucose 166 (H) 74 - 99 mg/dL    Sodium 135 (L) 136 - 145 mmol/L    Potassium 3.9 3.5 - 5.3 mmol/L    Chloride 96 (L) 98 - 107 mmol/L    Bicarbonate 30 21 - 32 mmol/L    Anion Gap 13 10 - 20 mmol/L    Urea Nitrogen 10 6 - 23 mg/dL    Creatinine 0.77 0.50 - 1.05 mg/dL    eGFR 84 >60 mL/min/1.73m*2    Calcium 8.6 8.6 - 10.3 mg/dL    Albumin 4.1 3.4 - 5.0 g/dL    Alkaline Phosphatase 42 33 - 136 U/L    Total Protein 7.3 6.4 - 8.2 g/dL    AST 44 (H) 9 - 39 U/L    Bilirubin, Total 0.4 0.0 - 1.2 mg/dL    ALT 24 7 - 45 U/L   Alcohol   Result Value Ref Range    Alcohol <10 <=10 mg/dL   Lactate   Result Value Ref Range    Lactate 2.2 (H) 0.4 - 2.0 mmol/L   Protime-INR   Result Value Ref Range    Protime 13.1 (H) 9.8 -  12.8 seconds    INR 1.2 (H) 0.9 - 1.1   Type And Screen   Result Value Ref Range    ABO TYPE O     Rh TYPE NEG     ANTIBODY SCREEN NEG    Influenza A, and B PCR   Result Value Ref Range    Flu A Result Detected (A) Not Detected    Flu B Result Not Detected Not Detected   Sars-CoV-2 PCR   Result Value Ref Range    Coronavirus 2019, PCR Not Detected Not Detected   SST TOP   Result Value Ref Range    Extra Tube Hold for add-ons.         Assessment/Plan      # Acute on Chronic Respiratory Failure  # Influenza A  #COPD exacerbation  #Hypoxia  #Diabetes type 2 controlled with insulin  #Status post mechanical fall  #T3 compression fracture  #Obesity  #Hypertension    Admit to hospital medicine  Droplet Precautions   No antibiotics at this time required due to cardinal symptoms not present  CT scan suggested bronchiolitis, now elevated WBC  Procalcitonin  Solu-Medrol 40 mg IV every 8 x 2 days and de-escalate  Oxygen wean as tolerated to 2 L home amount  Tessalon Perles twice daily  PT OT patient appears deconditioned  Scheduled DuoNebs every 6 hours x 3 and then as needed  Tamiflu x 5 days twice daily  T3 compression fracture - CONSULT ORTHOPEDIC - appreciate recommendations   Lidocaine patch  Percocet as needed, OARRS reviewed  No telemetry indicated at this time  Sliding scale coverage  Decrease Lantus to 10 units nightly due to decreased appetite  IV fluids 100 mL x 24 hours  Trend Labs CBC/BMP   Lung program at discharge   Hepatic Steatosis - follow up with GI at discharge   UA and Blood cultures pending     DVTp: Lovenox 40mg daily   FULL CODE  Diabetic Diet  Disposition: Possible SNF 48 hours    Time spent  56  minutes obtaining labs, imaging, recommendations, interview, assessment, examination, medication review/ordering, and EMR review.    Plan of care was discussed extensively with patient, RN, Dr. Munguia. Patient verbalized understanding through teach back method. All questions and concerns addressed upon  examination.     Of note, this documentation is completed using the Dragon Dictation system (voice recognition software). There may be spelling and/or grammatical errors that were not corrected prior to final submission.        JAMES Kendall-CNP

## 2025-01-30 LAB
ANION GAP SERPL CALC-SCNC: 12 MMOL/L (ref 10–20)
ARTERIAL PATENCY WRIST A: POSITIVE
BASE EXCESS BLDA CALC-SCNC: 7.7 MMOL/L (ref -2–3)
BASE EXCESS BLDA CALC-SCNC: 9.3 MMOL/L (ref -2–3)
BODY TEMPERATURE: ABNORMAL
BODY TEMPERATURE: ABNORMAL
BUN SERPL-MCNC: 12 MG/DL (ref 6–23)
CALCIUM SERPL-MCNC: 8.7 MG/DL (ref 8.6–10.3)
CHLORIDE SERPL-SCNC: 98 MMOL/L (ref 98–107)
CO2 SERPL-SCNC: 34 MMOL/L (ref 21–32)
CREAT SERPL-MCNC: 0.69 MG/DL (ref 0.5–1.05)
CRITICAL CALL TIME: 620
CRITICAL CALL TIME: 938
CRITICAL CALLED BY: ABNORMAL
CRITICAL CALLED BY: ABNORMAL
CRITICAL CALLED TO: ABNORMAL
CRITICAL CALLED TO: ABNORMAL
CRITICAL READ BACK: ABNORMAL
CRITICAL READ BACK: ABNORMAL
EGFRCR SERPLBLD CKD-EPI 2021: >90 ML/MIN/1.73M*2
EPAP CMH2O: 8 CM H2O
ERYTHROCYTE [DISTWIDTH] IN BLOOD BY AUTOMATED COUNT: 14.5 % (ref 11.5–14.5)
GLUCOSE BLD MANUAL STRIP-MCNC: 156 MG/DL (ref 74–99)
GLUCOSE BLD MANUAL STRIP-MCNC: 160 MG/DL (ref 74–99)
GLUCOSE BLD MANUAL STRIP-MCNC: 162 MG/DL (ref 74–99)
GLUCOSE BLD MANUAL STRIP-MCNC: 179 MG/DL (ref 74–99)
GLUCOSE BLD MANUAL STRIP-MCNC: 190 MG/DL (ref 74–99)
GLUCOSE BLD MANUAL STRIP-MCNC: 207 MG/DL (ref 74–99)
GLUCOSE BLD MANUAL STRIP-MCNC: 212 MG/DL (ref 74–99)
GLUCOSE SERPL-MCNC: 185 MG/DL (ref 74–99)
HCO3 BLDA-SCNC: 38.1 MMOL/L (ref 22–26)
HCO3 BLDA-SCNC: 39.5 MMOL/L (ref 22–26)
HCT VFR BLD AUTO: 50.7 % (ref 36–46)
HGB BLD-MCNC: 16.1 G/DL (ref 12–16)
HOLD SPECIMEN: NORMAL
INHALED O2 CONCENTRATION: 36 %
INHALED O2 CONCENTRATION: 50 %
IPAP CMH2O: 20 CM H2O
MCH RBC QN AUTO: 31.9 PG (ref 26–34)
MCHC RBC AUTO-ENTMCNC: 31.8 G/DL (ref 32–36)
MCV RBC AUTO: 100 FL (ref 80–100)
NRBC BLD-RTO: 0 /100 WBCS (ref 0–0)
OXYHGB MFR BLDA: 93 % (ref 94–98)
OXYHGB MFR BLDA: 96.2 % (ref 94–98)
PCO2 BLDA: 84 MM HG (ref 38–42)
PCO2 BLDA: 85 MM HG (ref 38–42)
PH BLDA: 7.26 PH (ref 7.38–7.42)
PH BLDA: 7.28 PH (ref 7.38–7.42)
PLATELET # BLD AUTO: 189 X10*3/UL (ref 150–450)
PO2 BLDA: 116 MM HG (ref 85–95)
PO2 BLDA: 77 MM HG (ref 85–95)
POTASSIUM SERPL-SCNC: 4.7 MMOL/L (ref 3.5–5.3)
PROCALCITONIN SERPL-MCNC: 0.03 NG/ML
RBC # BLD AUTO: 5.05 X10*6/UL (ref 4–5.2)
SAO2 % BLDA: 96 % (ref 94–100)
SAO2 % BLDA: 99 % (ref 94–100)
SODIUM SERPL-SCNC: 139 MMOL/L (ref 136–145)
SPECIMEN DRAWN FROM PATIENT: ABNORMAL
WBC # BLD AUTO: 10.1 X10*3/UL (ref 4.4–11.3)

## 2025-01-30 PROCEDURE — 82947 ASSAY GLUCOSE BLOOD QUANT: CPT

## 2025-01-30 PROCEDURE — 2500000002 HC RX 250 W HCPCS SELF ADMINISTERED DRUGS (ALT 637 FOR MEDICARE OP, ALT 636 FOR OP/ED): Performed by: STUDENT IN AN ORGANIZED HEALTH CARE EDUCATION/TRAINING PROGRAM

## 2025-01-30 PROCEDURE — 99291 CRITICAL CARE FIRST HOUR: CPT | Performed by: STUDENT IN AN ORGANIZED HEALTH CARE EDUCATION/TRAINING PROGRAM

## 2025-01-30 PROCEDURE — 2500000001 HC RX 250 WO HCPCS SELF ADMINISTERED DRUGS (ALT 637 FOR MEDICARE OP): Performed by: HOSPITALIST

## 2025-01-30 PROCEDURE — 94660 CPAP INITIATION&MGMT: CPT

## 2025-01-30 PROCEDURE — 2500000005 HC RX 250 GENERAL PHARMACY W/O HCPCS: Performed by: HOSPITALIST

## 2025-01-30 PROCEDURE — 82810 BLOOD GASES O2 SAT ONLY: CPT | Performed by: STUDENT IN AN ORGANIZED HEALTH CARE EDUCATION/TRAINING PROGRAM

## 2025-01-30 PROCEDURE — 2500000004 HC RX 250 GENERAL PHARMACY W/ HCPCS (ALT 636 FOR OP/ED)

## 2025-01-30 PROCEDURE — 2500000001 HC RX 250 WO HCPCS SELF ADMINISTERED DRUGS (ALT 637 FOR MEDICARE OP): Performed by: REGISTERED NURSE

## 2025-01-30 PROCEDURE — 2500000002 HC RX 250 W HCPCS SELF ADMINISTERED DRUGS (ALT 637 FOR MEDICARE OP, ALT 636 FOR OP/ED): Performed by: REGISTERED NURSE

## 2025-01-30 PROCEDURE — 2020000001 HC ICU ROOM DAILY

## 2025-01-30 PROCEDURE — 94640 AIRWAY INHALATION TREATMENT: CPT

## 2025-01-30 PROCEDURE — 80048 BASIC METABOLIC PNL TOTAL CA: CPT | Performed by: REGISTERED NURSE

## 2025-01-30 PROCEDURE — 5A09357 ASSISTANCE WITH RESPIRATORY VENTILATION, LESS THAN 24 CONSECUTIVE HOURS, CONTINUOUS POSITIVE AIRWAY PRESSURE: ICD-10-PCS | Performed by: STUDENT IN AN ORGANIZED HEALTH CARE EDUCATION/TRAINING PROGRAM

## 2025-01-30 PROCEDURE — 2500000004 HC RX 250 GENERAL PHARMACY W/ HCPCS (ALT 636 FOR OP/ED): Performed by: STUDENT IN AN ORGANIZED HEALTH CARE EDUCATION/TRAINING PROGRAM

## 2025-01-30 PROCEDURE — 36600 WITHDRAWAL OF ARTERIAL BLOOD: CPT

## 2025-01-30 PROCEDURE — 2500000004 HC RX 250 GENERAL PHARMACY W/ HCPCS (ALT 636 FOR OP/ED): Mod: JZ | Performed by: REGISTERED NURSE

## 2025-01-30 PROCEDURE — 85027 COMPLETE CBC AUTOMATED: CPT | Performed by: REGISTERED NURSE

## 2025-01-30 PROCEDURE — 2500000002 HC RX 250 W HCPCS SELF ADMINISTERED DRUGS (ALT 637 FOR MEDICARE OP, ALT 636 FOR OP/ED): Performed by: HOSPITALIST

## 2025-01-30 PROCEDURE — 36415 COLL VENOUS BLD VENIPUNCTURE: CPT | Performed by: REGISTERED NURSE

## 2025-01-30 PROCEDURE — 99291 CRITICAL CARE FIRST HOUR: CPT | Performed by: HOSPITALIST

## 2025-01-30 RX ORDER — INSULIN LISPRO 100 [IU]/ML
0-10 INJECTION, SOLUTION INTRAVENOUS; SUBCUTANEOUS
Status: DISCONTINUED | OUTPATIENT
Start: 2025-01-31 | End: 2025-02-04 | Stop reason: HOSPADM

## 2025-01-30 RX ORDER — SODIUM CHLORIDE 9 MG/ML
75 INJECTION, SOLUTION INTRAVENOUS CONTINUOUS
Status: ACTIVE | OUTPATIENT
Start: 2025-01-30 | End: 2025-01-31

## 2025-01-30 RX ORDER — IPRATROPIUM BROMIDE AND ALBUTEROL SULFATE 2.5; .5 MG/3ML; MG/3ML
3 SOLUTION RESPIRATORY (INHALATION)
Status: DISCONTINUED | OUTPATIENT
Start: 2025-01-30 | End: 2025-02-01

## 2025-01-30 RX ORDER — PANTOPRAZOLE SODIUM 40 MG/1
40 TABLET, DELAYED RELEASE ORAL
Status: DISCONTINUED | OUTPATIENT
Start: 2025-01-31 | End: 2025-02-04 | Stop reason: HOSPADM

## 2025-01-30 RX ORDER — MAGNESIUM SULFATE HEPTAHYDRATE 40 MG/ML
2 INJECTION, SOLUTION INTRAVENOUS ONCE
Status: COMPLETED | OUTPATIENT
Start: 2025-01-30 | End: 2025-01-30

## 2025-01-30 RX ADMIN — INSULIN LISPRO 2 UNITS: 100 INJECTION, SOLUTION INTRAVENOUS; SUBCUTANEOUS at 17:35

## 2025-01-30 RX ADMIN — INSULIN LISPRO 2 UNITS: 100 INJECTION, SOLUTION INTRAVENOUS; SUBCUTANEOUS at 12:11

## 2025-01-30 RX ADMIN — SODIUM CHLORIDE 1000 ML: 9 INJECTION, SOLUTION INTRAVENOUS at 21:12

## 2025-01-30 RX ADMIN — Medication 50 PERCENT: at 11:56

## 2025-01-30 RX ADMIN — INSULIN LISPRO 2 UNITS: 100 INJECTION, SOLUTION INTRAVENOUS; SUBCUTANEOUS at 07:03

## 2025-01-30 RX ADMIN — IPRATROPIUM BROMIDE AND ALBUTEROL SULFATE 3 ML: 2.5; .5 SOLUTION RESPIRATORY (INHALATION) at 19:23

## 2025-01-30 RX ADMIN — IPRATROPIUM BROMIDE AND ALBUTEROL SULFATE 3 ML: 2.5; .5 SOLUTION RESPIRATORY (INHALATION) at 15:23

## 2025-01-30 RX ADMIN — SODIUM CHLORIDE 100 ML/HR: 9 INJECTION, SOLUTION INTRAVENOUS at 00:05

## 2025-01-30 RX ADMIN — IPRATROPIUM BROMIDE AND ALBUTEROL SULFATE 3 ML: 2.5; .5 SOLUTION RESPIRATORY (INHALATION) at 11:05

## 2025-01-30 RX ADMIN — SIMVASTATIN 40 MG: 40 TABLET, FILM COATED ORAL at 20:19

## 2025-01-30 RX ADMIN — LISINOPRIL 5 MG: 5 TABLET ORAL at 05:15

## 2025-01-30 RX ADMIN — MAGNESIUM SULFATE HEPTAHYDRATE 2 G: 40 INJECTION, SOLUTION INTRAVENOUS at 21:43

## 2025-01-30 RX ADMIN — BENZONATATE 100 MG: 100 CAPSULE ORAL at 09:52

## 2025-01-30 RX ADMIN — OSELTAMIVIR PHOSPHATE 75 MG: 75 CAPSULE ORAL at 20:19

## 2025-01-30 RX ADMIN — INSULIN GLARGINE 10 UNITS: 100 INJECTION, SOLUTION SUBCUTANEOUS at 20:19

## 2025-01-30 RX ADMIN — METHYLPREDNISOLONE SODIUM SUCCINATE 40 MG: 40 INJECTION, POWDER, FOR SOLUTION INTRAMUSCULAR; INTRAVENOUS at 07:59

## 2025-01-30 RX ADMIN — SODIUM CHLORIDE 75 ML/HR: 9 INJECTION, SOLUTION INTRAVENOUS at 16:47

## 2025-01-30 RX ADMIN — Medication 50 PERCENT: at 19:23

## 2025-01-30 RX ADMIN — IPRATROPIUM BROMIDE AND ALBUTEROL SULFATE 3 ML: 2.5; .5 SOLUTION RESPIRATORY (INHALATION) at 00:45

## 2025-01-30 RX ADMIN — BENZONATATE 100 MG: 100 CAPSULE ORAL at 20:19

## 2025-01-30 RX ADMIN — INSULIN GLARGINE 10 UNITS: 100 INJECTION, SOLUTION SUBCUTANEOUS at 00:12

## 2025-01-30 RX ADMIN — IPRATROPIUM BROMIDE AND ALBUTEROL SULFATE 3 ML: 2.5; .5 SOLUTION RESPIRATORY (INHALATION) at 08:03

## 2025-01-30 RX ADMIN — ENOXAPARIN SODIUM 40 MG: 40 INJECTION SUBCUTANEOUS at 09:52

## 2025-01-30 RX ADMIN — OSELTAMIVIR PHOSPHATE 75 MG: 75 CAPSULE ORAL at 09:52

## 2025-01-30 ASSESSMENT — PAIN - FUNCTIONAL ASSESSMENT
PAIN_FUNCTIONAL_ASSESSMENT: 0-10

## 2025-01-30 ASSESSMENT — ENCOUNTER SYMPTOMS
DIZZINESS: 0
SHORTNESS OF BREATH: 1
COLOR CHANGE: 0
CHILLS: 0
EYES NEGATIVE: 1
SLEEP DISTURBANCE: 0
ENDOCRINE NEGATIVE: 1
LIGHT-HEADEDNESS: 0
DIARRHEA: 0
ACTIVITY CHANGE: 0
ABDOMINAL PAIN: 0
BACK PAIN: 1
WHEEZING: 1
CONFUSION: 0
ABDOMINAL DISTENTION: 0
JOINT SWELLING: 0
CHEST TIGHTNESS: 0
DIFFICULTY URINATING: 0
COUGH: 1
SHORTNESS OF BREATH: 0
COUGH: 0
FEVER: 0
ARTHRALGIAS: 0
SPEECH DIFFICULTY: 0
TROUBLE SWALLOWING: 0
PSYCHIATRIC NEGATIVE: 1
DYSURIA: 0
HEADACHES: 1
FATIGUE: 1
VOMITING: 0
CONSTIPATION: 0
NAUSEA: 0
UNEXPECTED WEIGHT CHANGE: 0
VOICE CHANGE: 0

## 2025-01-30 ASSESSMENT — COGNITIVE AND FUNCTIONAL STATUS - GENERAL
MOVING FROM LYING ON BACK TO SITTING ON SIDE OF FLAT BED WITH BEDRAILS: A LITTLE
TOILETING: TOTAL
DRESSING REGULAR UPPER BODY CLOTHING: TOTAL
PERSONAL GROOMING: TOTAL
WALKING IN HOSPITAL ROOM: TOTAL
EATING MEALS: A LITTLE
MOVING TO AND FROM BED TO CHAIR: A LOT
DRESSING REGULAR LOWER BODY CLOTHING: TOTAL
MOBILITY SCORE: 10
CLIMB 3 TO 5 STEPS WITH RAILING: TOTAL
STANDING UP FROM CHAIR USING ARMS: TOTAL
TURNING FROM BACK TO SIDE WHILE IN FLAT BAD: A LOT
DAILY ACTIVITIY SCORE: 8
HELP NEEDED FOR BATHING: TOTAL

## 2025-01-30 ASSESSMENT — PAIN SCALES - GENERAL
PAINLEVEL_OUTOF10: 0 - NO PAIN

## 2025-01-30 NOTE — PROGRESS NOTES
Physical Therapy                 Therapy Communication Note    Patient Name: Estrella Melton  MRN: [id number]  Department: [facility]  Room: 10/10-A  Today's Date: 1/30/2025     Discipline: Physical Therapy    PT Missed Visit: Yes     Missed Visit Reason: Missed Visit Reason: Patient placed on medical hold    Missed Time: Attempt    Comment: Received order for P.T. eval. Chart reviewed. Attempted to see Pt. for P.T. eval. but she was being transferred to MICU due to decline in resp. status.  Will re-attempt P.T. eval when Pt. is medically appropriate.

## 2025-01-30 NOTE — CONSULTS
Inpatient consult to Orthopaedic Surgery  Consult performed by: MARTÍN Yanez  Consult ordered by: MARTÍN Kendall  Reason for consult: T3 compressoin fracture          Consult Note  Patient: Estrella Melton  Unit/Bed: 1105/1105-A  YOB: 1955  MRN: [id number]  Acct: [id number]   Admitting Diagnosis: Influenza A [J10.1]  COPD with acute exacerbation (Multi) [J44.1]  Fall, initial encounter [W19.XXXA]  Acute on chronic respiratory failure with hypoxia (Multi) [J96.21]  Compression fracture of T3 vertebra, initial encounter [S22.030A]  Date:  1/29/2025  Hospital Day: 1    Complaint:  T3 compression fracture     History of Present Illness:  Estrella Melton is a 70 year old female patient per chart review with history of acute respiratory failure with hypercapnia, arthritis, chronic pain disorder, COPD, oxygen dependent 2L, CAD, hyperlipidemia, HTN, DM II, and lumbar disc disease who was admitted to hospital for influenza a, COPD with acute exacerbation, fall, acute on chronic respiratory failure, fall, and compression fracture. Orthopedics was consulted for evaluation and treatment recommendations for T3 compression fracture.     PMHx:  Past Medical History:   Diagnosis Date    Acute respiratory failure with hypercapnia (Multi) 10/10/2013    Arthritis     Chronic pain disorder     Congestion of upper airway     COPD (chronic obstructive pulmonary disease) (Multi)     Coronary artery disease     Diverticulosis     Dysfunctional uterine bleeding     Hyperlipidemia     Hypertension     Lumbar disc disease     Oxygen dependent     PRN 2L    Type 2 diabetes mellitus (Multi)        PSHx:  Past Surgical History:   Procedure Laterality Date    BOWEL RESECTION      COLON RESECTION-DIVERTICULITIS    CARPAL TUNNEL RELEASE Right     COLONOSCOPY      CORONARY ANGIOPLASTY WITH STENT PLACEMENT      DILATION AND CURETTAGE OF UTERUS      HERNIA REPAIR      HIP ARTHROPLASTY Right     HYSTERECTOMY          Social Hx:  Social History     Socioeconomic History    Marital status:    Tobacco Use    Smoking status: Every Day     Types: Cigarettes    Smokeless tobacco: Never   Vaping Use    Vaping status: Never Used   Substance and Sexual Activity    Alcohol use: Never    Drug use: Never    Sexual activity: Defer     Comment: HYSTER     Social Drivers of Health     Financial Resource Strain: Patient Declined (1/29/2025)    Overall Financial Resource Strain (CARDIA)     Difficulty of Paying Living Expenses: Patient declined   Food Insecurity: Patient Declined (1/29/2025)    Hunger Vital Sign     Worried About Running Out of Food in the Last Year: Patient declined     Ran Out of Food in the Last Year: Patient declined   Transportation Needs: Patient Declined (1/29/2025)    PRAPARE - Transportation     Lack of Transportation (Medical): Patient declined     Lack of Transportation (Non-Medical): Patient declined   Physical Activity: Inactive (1/29/2025)    Exercise Vital Sign     Days of Exercise per Week: 0 days     Minutes of Exercise per Session: 0 min   Stress: Patient Declined (1/29/2025)    [employer] [place] [employer] - Occupational Stress Questionnaire     Feeling of Stress : Patient declined   Social Connections: Patient Declined (1/29/2025)    Social Connection and Isolation Panel [NHANES]     Frequency of Communication with Friends and Family: Patient declined     Frequency of Social Gatherings with Friends and Family: Patient declined     Attends [employer] Services: Patient declined     Active Member of Clubs or Organizations: Patient declined     Attends Club or Organization Meetings: Patient declined     Marital Status: Patient declined   Intimate Partner Violence: Patient Declined (1/29/2025)    Humiliation, Afraid, Rape, and Kick questionnaire     Fear of Current or Ex-Partner: Patient declined     Emotionally Abused: Patient declined     Physically Abused: Patient declined     Sexually  Abused: Patient declined   Housing Stability: Patient Declined (1/29/2025)    Housing Stability Vital Sign     Unable to Pay for Housing in the Last Year: Patient declined     Number of Times Moved in the Last Year: 0     Homeless in the Last Year: Patient declined       Family Hx:  No family history on file.    Review of Systems:   Review of Systems   Constitutional:  Negative for activity change and fever.   HENT:  Negative for congestion, ear pain, trouble swallowing and voice change.    Eyes: Negative.    Respiratory:  Negative for cough, chest tightness and shortness of breath.    Cardiovascular:  Negative for chest pain.   Gastrointestinal:  Negative for abdominal distention, abdominal pain, diarrhea, nausea and vomiting.   Endocrine: Negative.    Genitourinary:  Negative for difficulty urinating and dysuria.   Musculoskeletal:  Negative for arthralgias, gait problem and joint swelling.   Skin: Negative.    Neurological:         Intermittent confusion    Psychiatric/Behavioral: Negative.     All other systems reviewed and are negative.        Physical Examination:    Visit Vitals  /79   Pulse 107   Temp 36.5 °C (97.7 °F)   Resp (!) 30      Physical Exam  Vitals and nursing note reviewed.   Constitutional:       General: She is not in acute distress.     Appearance: Normal appearance.   HENT:      Head: Normocephalic.      Nose: Nose normal.      Mouth/Throat:      Mouth: Mucous membranes are moist.   Eyes:      Extraocular Movements: Extraocular movements intact.      Pupils: Pupils are equal, round, and reactive to light.   Cardiovascular:      Rate and Rhythm: Normal rate and regular rhythm.      Pulses: Normal pulses.      Heart sounds: Normal heart sounds.   Pulmonary:      Effort: Pulmonary effort is normal.      Breath sounds: Normal breath sounds.   Abdominal:      General: Bowel sounds are normal.      Palpations: Abdomen is soft.   Musculoskeletal:      Cervical back: Normal range of motion. No  "rigidity or tenderness.   Skin:     General: Skin is warm.      Capillary Refill: Capillary refill takes less than 2 seconds.   Neurological:      General: No focal deficit present.      Mental Status: She is alert. She is disoriented.   Psychiatric:         Mood and Affect: Mood normal.         LABS:  CBC:   Lab Results   Component Value Date    WBC 10.1 01/30/2025    RBC 5.05 01/30/2025    HGB 16.1 (H) 01/30/2025    HCT 50.7 (H) 01/30/2025     01/30/2025    MCH 31.9 01/30/2025    MCHC 31.8 (L) 01/30/2025    RDW 14.5 01/30/2025     01/30/2025     CBC with Differential:    Lab Results   Component Value Date    WBC 10.1 01/30/2025    RBC 5.05 01/30/2025    HGB 16.1 (H) 01/30/2025    HCT 50.7 (H) 01/30/2025     01/30/2025     01/30/2025    MCH 31.9 01/30/2025    MCHC 31.8 (L) 01/30/2025    RDW 14.5 01/30/2025    NRBC 0.0 01/30/2025    LYMPHOPCT 17.0 01/29/2025    MONOPCT 9.6 01/29/2025    EOSPCT 0.0 01/29/2025    BASOPCT 0.9 01/29/2025    MONOSABS 0.67 01/29/2025    LYMPHSABS 1.19 (L) 01/29/2025    EOSABS 0.00 01/29/2025    BASOSABS 0.06 01/29/2025     CMP:    Lab Results   Component Value Date     01/30/2025    K 4.7 01/30/2025    CL 98 01/30/2025    CO2 34 (H) 01/30/2025    BUN 12 01/30/2025    CREATININE 0.69 01/30/2025    GLUCOSE 185 (H) 01/30/2025    PROT 7.3 01/29/2025    CALCIUM 8.7 01/30/2025    BILITOT 0.4 01/29/2025    ALKPHOS 42 01/29/2025    AST 44 (H) 01/29/2025    ALT 24 01/29/2025     BMP:    Lab Results   Component Value Date     01/30/2025    K 4.7 01/30/2025    CL 98 01/30/2025    CO2 34 (H) 01/30/2025    BUN 12 01/30/2025    CREATININE 0.69 01/30/2025    CALCIUM 8.7 01/30/2025    GLUCOSE 185 (H) 01/30/2025     Magnesium:No results found for: \"MG\"  Troponin:  No results found for: \"TROPONINI\"    Imaging   CT head W O contrast trauma protocol    Addendum Date: 1/29/2025    Interpreted By:  Kizzy Childers, ADDENDUM: Findings were communicated with Dr. Huynh by " Dr. Kizzy Childers via phone at 12:09 pm on 1/29/2025 with read back verification.   Signed by: Kizzy Childers 1/29/2025 12:13 PM   -------- ORIGINAL REPORT -------- Dictation workstation:   [id number]    Result Date: 1/29/2025  Interpreted By:  Kizzy Childers, STUDY: CT HEAD W/O CONTRAST TRAUMA PROTOCOL; CT CERVICAL SPINE WO IV CONTRAST;  1/29/2025 11:39 am   INDICATION: Signs/Symptoms:fall.     COMPARISON: CT head 01/15/2013   ACCESSION NUMBER(S): [id number]; [id number]   ORDERING CLINICIAN: AGAPITO MCGHEE   TECHNIQUE: Noncontrast axial CT scan of head was performed. Angled reformats in brain and bone windows were generated. The images were reviewed in bone, brain, blood and soft tissue windows. Noncontrast axial CT of the cervical spine was obtained. Sagittal and coronal reformats were generated.   FINDINGS: CT HEAD:   CSF Spaces: The ventricles, sulci and basal cisterns are within normal limits. There is no abnormal extraaxial fluid collection.   Parenchyma:  The grey-white differentiation is intact. No mass effect or midline shift.  There is no intracranial hemorrhage.   Calvarium: No calvarial fractures.   Paranasal sinuses and mastoids: Mild mucosal edema of the ethmoid sinuses. Otherwise, the paranasal sinuses are clear. The bilateral mastoid air cells are clear.     CT CERVICAL SPINE: Evaluation of the cervical spine is limited due to patient motion.   Fractures: No acute fracture or traumatic malalignment.   Vertebral Alignment: There is straightening of the normal cervical lordosis, likely positional versus muscle spasm.   Craniocervical Junction: The odontoid process and craniocervical junction are intact.   Vertebrae/Disc Spaces: The cervical vertebral body heights are intact and the disc spaces are preserved.   Prevertebral/Paraspinal Soft Tissues: The prevertebral and paraspinal soft tissues are unremarkable.       1. No acute intracranial abnormality. 2. Within limitations of patient motion,  there is no acute fracture or traumatic malalignment of the cervical spine.   MACRO: None   Signed by: Kizzy Childers 1/29/2025 11:57 AM Dictation workstation:   [id number]    CT cervical spine wo IV contrast    Addendum Date: 1/29/2025    Interpreted By:  Kizzy Childers, ADDENDUM: Findings were communicated with Dr. Mcghee by Dr. Kizzy Childers via phone at 12:09 pm on 1/29/2025 with read back verification.   Signed by: Kizzy Childers 1/29/2025 12:13 PM   -------- ORIGINAL REPORT -------- Dictation workstation:   [id number]    Result Date: 1/29/2025  Interpreted By:  Kizzy Childers, STUDY: CT HEAD W/O CONTRAST TRAUMA PROTOCOL; CT CERVICAL SPINE WO IV CONTRAST;  1/29/2025 11:39 am   INDICATION: Signs/Symptoms:fall.     COMPARISON: CT head 01/15/2013   ACCESSION NUMBER(S): [id number]; [id number]   ORDERING CLINICIAN: AGAPITO MCGHEE   TECHNIQUE: Noncontrast axial CT scan of head was performed. Angled reformats in brain and bone windows were generated. The images were reviewed in bone, brain, blood and soft tissue windows. Noncontrast axial CT of the cervical spine was obtained. Sagittal and coronal reformats were generated.   FINDINGS: CT HEAD:   CSF Spaces: The ventricles, sulci and basal cisterns are within normal limits. There is no abnormal extraaxial fluid collection.   Parenchyma:  The grey-white differentiation is intact. No mass effect or midline shift.  There is no intracranial hemorrhage.   Calvarium: No calvarial fractures.   Paranasal sinuses and mastoids: Mild mucosal edema of the ethmoid sinuses. Otherwise, the paranasal sinuses are clear. The bilateral mastoid air cells are clear.     CT CERVICAL SPINE: Evaluation of the cervical spine is limited due to patient motion.   Fractures: No acute fracture or traumatic malalignment.   Vertebral Alignment: There is straightening of the normal cervical lordosis, likely positional versus muscle spasm.   Craniocervical Junction: The odontoid process and  craniocervical junction are intact.   Vertebrae/Disc Spaces: The cervical vertebral body heights are intact and the disc spaces are preserved.   Prevertebral/Paraspinal Soft Tissues: The prevertebral and paraspinal soft tissues are unremarkable.       1. No acute intracranial abnormality. 2. Within limitations of patient motion, there is no acute fracture or traumatic malalignment of the cervical spine.   MACRO: None   Signed by: Kizzy Childers 1/29/2025 11:57 AM Dictation workstation:   [id number]    CT chest abdomen pelvis w IV contrast    Result Date: 1/29/2025  Interpreted By:  Kizzy Childers, STUDY: CT CHEST ABDOMEN PELVIS W IV CONTRAST;  1/29/2025 11:53 am   INDICATION: Signs/Symptoms:Trauma     COMPARISON: CT abdomen pelvis 07/22/2013   ACCESSION NUMBER(S): [id number]   ORDERING CLINICIAN: AGAPITO MCGHEE   TECHNIQUE: CT of the chest, abdomen, and pelvis was performed. Contiguous axial images were obtained at  5 mm slice thickness through the chest, and at  3 mm through the abdomen and pelvis. Coronal and sagittal reconstructions at  3 mm slice thickness were performed.  100 ML of Omnipaque 350 was administered intravenously without immediate complication.   FINDINGS: CHEST:   LUNG/PLEURA/LARGE AIRWAYS: Clustered peribronchovascular ground-glass to consolidative opacities seen in the posterior right upper lobe. Atelectasis of the lingula noted. No pleural effusion or pneumothorax. Trachea and right and left main bronchi are patent. No concerning lung nodule.   VESSELS: Aorta and pulmonary arteries are normal caliber. Moderate atherosclerotic changes of the aorta are identified. Moderate coronary artery calcifications are present.   HEART: The heart is normal in size. No pericardial effusion   MEDIASTINUM AND AMBER: No mediastinal, hilar or axillary lymphadenopathy is present. The esophagus is normal.   CHEST WALL AND LOWER NECK: No soft tissue masses in the chest wall. The visualized thyroid gland appears  within normal limits.   ABDOMEN:   LIVER: The liver is normal in size without evidence of focal liver lesions. The liver is diffusely decreased in attenuation compatible with hepatic steatosis.   BILE DUCTS: No biliary dilatation.   GALLBLADDER: Punctuate cholelithiasis without gallbladder wall thickening or distention.   PANCREAS: The pancreas appears unremarkable without evidence of ductal dilatation or masses.   SPLEEN: The spleen is normal in size.   ADRENAL GLANDS: No adrenal nodule or thickening.   KIDNEYS AND URETERS: The kidneys are normal in size and enhance symmetrically. Hypoattenuating lesion in the left kidney, likely benign. No hydroureteronephrosis or nephroureterolithiasis is identified.   PELVIS: Beam hardening artifact from right hip arthroplasty limits evaluation in the pelvis.   BLADDER: Within normal limits.   REPRODUCTIVE ORGANS: Hysterectomy   BOWEL: The stomach is unremarkable. The small and large bowel are normal in caliber and demonstrate no wall thickening. Normal appendix. Suture material is noted in the sigmoid colon.   VESSELS: Moderate atherosclerotic calcifications of the aortoiliac arteries. The IVC appears normal. Portal vein, splenic vein, and SMV are patent.   PERITONEUM/RETROPERITONEUM/LYMPH NODES: No ascites or fluid collection. The retroperitoneum is unremarkable. No abdominopelvic lymphadenopathy is present.   BONE AND SOFT TISSUE: No suspicious osseous lesions are identified. Right hip arthroplasty. No soft tissue masses in the abdominal wall.       1. No acute traumatic abnormality in the chest, abdomen and pelvis. 2. Clustered peribronchovascular ground-glass to consolidative opacities seen in the posterior right upper lobe, likely bronchiolitis. Follow-up CT chest recommended in 3 months to ensure resolution. 3. Hepatic steatosis, please correlate with liver function tests.   Findings were communicated with Dr. Huynh by Dr. Kizzy Childers via phone at 12:09 pm on  1/29/2025 with read back verification.   MACRO: None   Signed by: Kizzy Childers 1/29/2025 12:13 PM Dictation workstation:   [id number]    CT thoracic spine wo IV contrast    Result Date: 1/29/2025  Interpreted By:  Kizzy Childers, STUDY: CT LUMBAR SPINE WO IV CONTRAST; CT THORACIC SPINE WO IV CONTRAST 1/29/2025 11:55 am   INDICATION: Signs/Symptoms:fall     COMPARISON: None.   ACCESSION NUMBER(S): [id number]; [id number]   ORDERING CLINICIAN: AGAPITO MCGHEE   TECHNIQUE: Axial CT images of the thoracic and lumbar spine are obtained. Axial, coronal and sagittal reconstructions are provided for review.   FINDINGS:   CT THORACIC SPINE:   PARASPINAL SOFT TISSUES: No significant abnormality.   ALIGNMENT:  No traumatic malalignment or traumatic facet widening.   VERTEBRAE:  A grade 1 compression deformity of the T3 vertebral body is noted, without retropulsion into the spinal canal.   SPINAL CANAL/INTERVERTEBRAL DISCS: No high-grade spinal canal stenosis. No significant disc height loss.   VISUALIZED CHEST: No significant abnormality.     CT LUMBAR SPINE:   PARASPINAL SOFT TISSUES: No significant abnormality.   ALIGNMENT:  No traumatic malalignment or traumatic facet widening.   VERTEBRAE:  No acute fracture. Vertebral body heights are maintained.   SPINAL CANAL/INTERVERTEBRAL DISCS: No high-grade spinal canal stenosis. No significant disc height loss.   NEURAL FORAMINA: No significant neural stenosis. Varying degrees of neuroforaminal stenosis, most significant at L4/L5 levels.   VISUALIZED ABDOMEN: No significant abnormality.         Grade 1 T3 vertebral body compression deformity without retropulsion into the spinal canal, of uncertain chronicity. No other traumatic abnormality of the thoracic and lumbar spine.   Findings were communicated with Dr. Mcghee by Dr. Kizzy Childers via phone at 12:09 pm on 1/29/2025 with read back verification.   MACRO: None   Signed by: Kizzy Childers 1/29/2025 12:12 PM Dictation  workstation:   [id number]    CT lumbar spine wo IV contrast    Result Date: 1/29/2025  Interpreted By:  Kizzy Childers, STUDY: CT LUMBAR SPINE WO IV CONTRAST; CT THORACIC SPINE WO IV CONTRAST 1/29/2025 11:55 am   INDICATION: Signs/Symptoms:fall     COMPARISON: None.   ACCESSION NUMBER(S): [id number]; [id number]   ORDERING CLINICIAN: AGAPITO MCGHEE   TECHNIQUE: Axial CT images of the thoracic and lumbar spine are obtained. Axial, coronal and sagittal reconstructions are provided for review.   FINDINGS:   CT THORACIC SPINE:   PARASPINAL SOFT TISSUES: No significant abnormality.   ALIGNMENT:  No traumatic malalignment or traumatic facet widening.   VERTEBRAE:  A grade 1 compression deformity of the T3 vertebral body is noted, without retropulsion into the spinal canal.   SPINAL CANAL/INTERVERTEBRAL DISCS: No high-grade spinal canal stenosis. No significant disc height loss.   VISUALIZED CHEST: No significant abnormality.     CT LUMBAR SPINE:   PARASPINAL SOFT TISSUES: No significant abnormality.   ALIGNMENT:  No traumatic malalignment or traumatic facet widening.   VERTEBRAE:  No acute fracture. Vertebral body heights are maintained.   SPINAL CANAL/INTERVERTEBRAL DISCS: No high-grade spinal canal stenosis. No significant disc height loss.   NEURAL FORAMINA: No significant neural stenosis. Varying degrees of neuroforaminal stenosis, most significant at L4/L5 levels.   VISUALIZED ABDOMEN: No significant abnormality.         Grade 1 T3 vertebral body compression deformity without retropulsion into the spinal canal, of uncertain chronicity. No other traumatic abnormality of the thoracic and lumbar spine.   Findings were communicated with Dr. Mcghee by Dr. Kizzy Childers via phone at 12:09 pm on 1/29/2025 with read back verification.   MACRO: None   Signed by: Kizzy Childers 1/29/2025 12:12 PM Dictation workstation:   [id number]    XR chest 1 view    Result Date: 1/29/2025  Interpreted By:  Kamala Vallejo, STUDY:  XR CHEST 1 VIEW;  1/29/2025 11:35 am   INDICATION: Signs/Symptoms:Trauma.     COMPARISON: 08/11/2021   ACCESSION NUMBER(S): [id number]   ORDERING CLINICIAN: AGAPITO MCGHEE   FINDINGS: Artifact from overlying monitoring leads noted. Probable prominent fat pad near the cardiac apex similar to the prior exam allowing for differences in projection. No definite acute focal infiltrate or pleural effusion. The cardiac silhouette is within normal limits for size.       No definite acute cardiopulmonary process radiographically.   MACRO: None.   Signed by: Kamala Vallejo 1/29/2025 11:38 AM Dictation workstation:   [id number]        Assessment:    70 year old female patient admitted with influenza a, acute on chronic respiratory failure, COPD, fall and compression fracture.     Imaging was reviewed and is consistent with age indeterminate compression fracture that does not appear acute in nature however previous imaging reviewed and do no see where patient had previous compression fracture of T3.     She is able to move bilateral lower and upper extremities independently. There is no midline spinal process tenderness or step off appreciated. She has no neurological deficits.     Recommend pain management PRN and therapy.   She may WBAT to bilateral lower extremities.   She can follow up PRN in the outpatient orthopedic clinic with spine team.     Thank you for this consultation and allowing us to be a part of this patient's care.     Orthopedics will sign off. Please call for any questions or concerns.        Plan:  Recommend pain management PRN  WBAT  Follow up outpatient PRN  Orthopedics will sign off. Please call for any questions or concerns.             Electronically signed by MARTÍN Yanez on 1/30/2025 at 7:41 AM

## 2025-01-30 NOTE — CARE PLAN
The clinical goals for the shift include Patient's sp02 will remain greater than 90% for the remainder of the shift.    Over the shift, the patient did make progress toward the following goals.     Problem: Pain - Adult  Goal: Verbalizes/displays adequate comfort level or baseline comfort level  1/30/2025 1309 by Ebonie Case RN  Outcome: Progressing  1/30/2025 1309 by Ebonie Case RN  Outcome: Progressing     Problem: Safety - Adult  Goal: Free from fall injury  1/30/2025 1309 by Ebonie Case RN  Outcome: Progressing  1/30/2025 1309 by Ebonie Case RN  Outcome: Progressing     Problem: Discharge Planning  Goal: Discharge to home or other facility with appropriate resources  1/30/2025 1309 by Ebonie Case RN  Outcome: Progressing  1/30/2025 1309 by Ebonie Case RN  Outcome: Progressing     Problem: Chronic Conditions and Co-morbidities  Goal: Patient's chronic conditions and co-morbidity symptoms are monitored and maintained or improved  1/30/2025 1309 by Ebonie Case RN  Outcome: Progressing  1/30/2025 1309 by Ebonie Case RN  Outcome: Progressing     Problem: Nutrition  Goal: Nutrient intake appropriate for maintaining nutritional needs  1/30/2025 1309 by Ebonie Case RN  Outcome: Progressing  1/30/2025 1309 by Ebonie Case RN  Outcome: Progressing     Problem: Skin  Goal: Decreased wound size/increased tissue granulation at next dressing change  1/30/2025 1309 by Ebonie Case RN  Outcome: Progressing  Flowsheets (Taken 1/30/2025 1309)  Decreased wound size/increased tissue granulation at next dressing change: Promote sleep for wound healing  1/30/2025 1309 by Ebonie Case RN  Outcome: Progressing  Flowsheets (Taken 1/30/2025 1309)  Decreased wound size/increased tissue granulation at next dressing change: Promote sleep for wound healing  Goal: Participates in plan/prevention/treatment measures  1/30/2025 1309 by Ebonie Case RN  Outcome:  Progressing  Flowsheets (Taken 1/30/2025 1309)  Participates in plan/prevention/treatment measures: Elevate heels  1/30/2025 1309 by Ebonie Case RN  Outcome: Progressing  Flowsheets (Taken 1/30/2025 1309)  Participates in plan/prevention/treatment measures: Elevate heels  Goal: Prevent/manage excess moisture  1/30/2025 1309 by Ebonie Case RN  Outcome: Progressing  Flowsheets (Taken 1/30/2025 1309)  Prevent/manage excess moisture: Cleanse incontinence/protect with barrier cream  1/30/2025 1309 by Ebonie Case RN  Outcome: Progressing  Flowsheets (Taken 1/30/2025 1309)  Prevent/manage excess moisture: Cleanse incontinence/protect with barrier cream  Goal: Prevent/minimize sheer/friction injuries  1/30/2025 1309 by Ebonie Case RN  Outcome: Progressing  Flowsheets (Taken 1/30/2025 1309)  Prevent/minimize sheer/friction injuries: Increase activity/out of bed for meals  1/30/2025 1309 by Ebonie Case RN  Outcome: Progressing  Flowsheets (Taken 1/30/2025 1309)  Prevent/minimize sheer/friction injuries: Increase activity/out of bed for meals  Goal: Promote/optimize nutrition  1/30/2025 1309 by Ebonie Case RN  Outcome: Progressing  Flowsheets (Taken 1/30/2025 1309)  Promote/optimize nutrition: Monitor/record intake including meals  1/30/2025 1309 by Ebonie Case RN  Outcome: Progressing  Flowsheets (Taken 1/30/2025 1309)  Promote/optimize nutrition: Monitor/record intake including meals  Goal: Promote skin healing  1/30/2025 1309 by Ebonie Case RN  Outcome: Progressing  Flowsheets (Taken 1/30/2025 1309)  Promote skin healing: Turn/reposition every 2 hours/use positioning/transfer devices  1/30/2025 1309 by Ebonie Case RN  Outcome: Progressing  Flowsheets (Taken 1/30/2025 1309)  Promote skin healing: Turn/reposition every 2 hours/use positioning/transfer devices     Problem: Fall/Injury  Goal: Not fall by end of shift  1/30/2025 1309 by Ebonie Case, RN  Outcome:  Progressing  1/30/2025 1309 by Ebonie Case RN  Outcome: Progressing  Goal: Be free from injury by end of the shift  1/30/2025 1309 by Ebonie Case RN  Outcome: Progressing  1/30/2025 1309 by Ebonie Case RN  Outcome: Progressing  Goal: Verbalize understanding of personal risk factors for fall in the hospital  1/30/2025 1309 by Ebonie Case RN  Outcome: Progressing  1/30/2025 1309 by Ebonie Case RN  Outcome: Progressing  Goal: Verbalize understanding of risk factor reduction measures to prevent injury from fall in the home  1/30/2025 1309 by Ebonie Case RN  Outcome: Progressing  1/30/2025 1309 by Ebonie Case RN  Outcome: Progressing  Goal: Use assistive devices by end of the shift  1/30/2025 1309 by Eobnie Case RN  Outcome: Progressing  1/30/2025 1309 by Ebonie Case RN  Outcome: Progressing  Goal: Pace activities to prevent fatigue by end of the shift  1/30/2025 1309 by Ebonie Case RN  Outcome: Progressing  1/30/2025 1309 by Ebonie Case RN  Outcome: Progressing     Problem: Diabetes  Goal: Achieve decreasing blood glucose levels by end of shift  1/30/2025 1309 by Ebonie Case RN  Outcome: Progressing  1/30/2025 1309 by Ebonie Case RN  Outcome: Progressing  Goal: Increase stability of blood glucose readings by end of shift  1/30/2025 1309 by Ebonie Case RN  Outcome: Progressing  1/30/2025 1309 by Ebonie Case RN  Outcome: Progressing  Goal: Decrease in ketones present in urine by end of shift  1/30/2025 1309 by Ebonie Case RN  Outcome: Progressing  1/30/2025 1309 by Ebonie Case RN  Outcome: Progressing  Goal: Maintain electrolyte levels within acceptable range throughout shift  1/30/2025 1309 by Ebonie Case RN  Outcome: Progressing  1/30/2025 1309 by Ebonie Case RN  Outcome: Progressing  Goal: Maintain glucose levels >70mg/dl to <250mg/dl throughout shift  1/30/2025 1309 by Ebonie Case RN  Outcome:  Progressing  1/30/2025 1309 by Ebonie Case RN  Outcome: Progressing  Goal: No changes in neurological exam by end of shift  1/30/2025 1309 by Ebonie Case RN  Outcome: Progressing  1/30/2025 1309 by Ebonie Case RN  Outcome: Progressing  Goal: Learn about and adhere to nutrition recommendations by end of shift  1/30/2025 1309 by Ebonie Case RN  Outcome: Progressing  1/30/2025 1309 by Ebonie Case RN  Outcome: Progressing  Goal: Vital signs within normal range for age by end of shift  1/30/2025 1309 by Ebonie Case RN  Outcome: Progressing  1/30/2025 1309 by Ebonie Case RN  Outcome: Progressing  Goal: Increase self care and/or family involovement by end of shift  1/30/2025 1309 by Ebonie Case RN  Outcome: Progressing  1/30/2025 1309 by Ebonie Case RN  Outcome: Progressing  Goal: Receive DSME education by end of shift  1/30/2025 1309 by Ebonie Case RN  Outcome: Progressing  1/30/2025 1309 by Ebonie Case RN  Outcome: Progressing     Problem: Pain  Goal: Takes deep breaths with improved pain control throughout the shift  1/30/2025 1309 by Ebonie Case RN  Outcome: Progressing  1/30/2025 1309 by Ebonie Case RN  Outcome: Progressing  Goal: Turns in bed with improved pain control throughout the shift  1/30/2025 1309 by Ebonie Case RN  Outcome: Progressing  1/30/2025 1309 by Ebonie Case RN  Outcome: Progressing  Goal: Walks with improved pain control throughout the shift  1/30/2025 1309 by Ebonie Case RN  Outcome: Progressing  1/30/2025 1309 by Ebonie Case RN  Outcome: Progressing  Goal: Performs ADL's with improved pain control throughout shift  1/30/2025 1309 by Ebonie Case RN  Outcome: Progressing  1/30/2025 1309 by Ebonie Case RN  Outcome: Progressing  Goal: Participates in PT with improved pain control throughout the shift  1/30/2025 1309 by Ebonie Case RN  Outcome: Progressing  1/30/2025 1309 by Ebonie VYAS  SON Case  Outcome: Progressing  Goal: Free from opioid side effects throughout the shift  1/30/2025 1309 by Ebonie Case RN  Outcome: Progressing  1/30/2025 1309 by Ebonie Case RN  Outcome: Progressing  Goal: Free from acute confusion related to pain meds throughout the shift  1/30/2025 1309 by Ebonie Case RN  Outcome: Progressing  1/30/2025 1309 by Ebonie Case RN  Outcome: Progressing

## 2025-01-30 NOTE — PROGRESS NOTES
Occupational Therapy                 Therapy Communication Note    Patient Name: Estrella Melton  MRN: [id number]  Department: [facility]  Room: 10/10-A  Today's Date: 1/30/2025     Discipline: Occupational Therapy    OT Missed Visit: Yes     Missed Visit Reason: Missed Visit Reason: Patient placed on medical hold    Comment: OT order received and chart reviewed. Patient being transferred to ICU d/t declining respiratory status. Will re attempt as medically appropriate.

## 2025-01-30 NOTE — SIGNIFICANT EVENT
Notified by nursing due to altered mentation.  On assessment patient is oriented x 2 (stated her birthday instead of current year) and appears to be mentating appropriately however slow to respond.  Not hypoglycemic.  ABG obtained which shows pH 7.26, pCO2 85, pO2 77.  She is already receiving appropriate treatment with IV steroids, scheduled nebulizers, and Tamiflu for COPD exacerbation and influenza A pneumonia.  ICU team at bedside as well.  She does not appear to be in respiratory distress.  No significant increased work of breathing.  Will place her on BiPAP and repeat ABG in 3 hours.  If no improvement will have day team contact ICU for possible transfer.  Stable to remain on current floor at this time.    Simone Campbell MD

## 2025-01-30 NOTE — CONSULTS
[facility] Critical Care Medicine Consult Note      Date:  1/30/2025  Patient:  Estrella Melton  YOB: 1955  MRN:  [id number]   Admit Date:  1/29/2025  ========================================================================================================    Chief Complaint   Patient presents with    Fall         History of Present Illness:  Estrella Melton is a 70 y.o. year old female patient who presented with complaints of back pain after having a mechanical fall at home.  The patient has multiple comorbidities including COPD, diabetes and coronary artery disease.  Patient states that she has been feeling poorly over the last few days and having increased shortness of breath associated with malaise.  She was standing on her porch when she slipped and had a mechanical fall.  Normally she walks with the aid of a walker.  Ever since the fall she has been having more difficulty with walking and now having acute back pain.  In the emergency department she had imaging of the spine performed as a minor trauma evaluation which showed evidence of a new T3 compression fracture.  Conservative management was recommended at this time.  She was then swabbed for flu and came back positive.  Imaging of the chest revealed small tree-in-bud opacities in the right upper lobe otherwise no significant findings.  She was admitted to the hospital for further evaluation and management.    The patient was on IV steroids and scheduled bronchodilator when she became more lethargic and confused.  She was placed on BiPAP support.  This morning the patient's lethargy was only minimally improved with no significant improvement in the patient's blood gas which showed a acute on chronic respiratory acidosis.  Based on prior blood work, the patient's estimated pCO2 should be in the high 50s, currently greater than 80.  When I arrived at bedside the patient was getting cleaned up and able to answer most questions for me.  She states that she  feels short of breath and is tired.  She does think that the BiPAP mask is helping.  She does not have a BiPAP at home.    Medical History:  Past Medical History:   Diagnosis Date    Acute respiratory failure with hypercapnia (Multi) 10/10/2013    Arthritis     Chronic pain disorder     Congestion of upper airway     COPD (chronic obstructive pulmonary disease) (Multi)     Coronary artery disease     Diverticulosis     Dysfunctional uterine bleeding     Hyperlipidemia     Hypertension     Lumbar disc disease     Oxygen dependent     PRN 2L    Type 2 diabetes mellitus (Multi)      Past Surgical History:   Procedure Laterality Date    BOWEL RESECTION      COLON RESECTION-DIVERTICULITIS    CARPAL TUNNEL RELEASE Right     COLONOSCOPY      CORONARY ANGIOPLASTY WITH STENT PLACEMENT      DILATION AND CURETTAGE OF UTERUS      HERNIA REPAIR      HIP ARTHROPLASTY Right     HYSTERECTOMY       Medications Prior to Admission   Medication Sig Dispense Refill Last Dose/Taking    Breo Ellipta 100-25 mcg/dose inhaler Inhale 1 puff once daily.   1/28/2025    diclofenac (Voltaren) 50 mg EC tablet Take 1 tablet (50 mg) by mouth 2 times a day as needed for pain.   Unknown    insulin glargine (Lantus Solostar U-100 Insulin) 100 unit/mL (3 mL) pen Inject 15 units subcutaneously nightly 3 mL 0 1/28/2025 Bedtime    lisinopril 5 mg tablet Take 1 tablet (5 mg) by mouth early in the morning..   1/28/2025 Morning    metFORMIN (Glucophage) 1,000 mg tablet Take 1 tablet (1,000 mg) by mouth 2 times daily (morning and late afternoon). 60 tablet 0 1/28/2025 Evening    simvastatin (Zocor) 40 mg tablet Take 1 tablet (40 mg) by mouth once daily at bedtime. 30 tablet 0 1/28/2025 Bedtime    albuterol 90 mcg/actuation inhaler Inhale 2 puffs every 6 hours if needed for wheezing.   Unknown    cyclobenzaprine (Flexeril) 10 mg tablet Take 1 tablet (10 mg) by mouth 2 times a day as needed for muscle spasms.   Unknown    guaiFENesin (Mucinex) 1,200 mg tablet  "extended release 12hr Take 1 tablet (1,200 mg) by mouth 2 times a day as needed.   Unknown    ipratropium-albuteroL (Duo-Neb) 0.5-2.5 mg/3 mL nebulizer solution Take 3 mL by nebulization every 6 hours if needed for wheezing or shortness of breath.   Unknown    lisinopril 10 mg tablet Take 1 tablet (10 mg) by mouth once daily. 30 tablet 0     oxyCODONE-acetaminophen (Percocet) 7.5-325 mg tablet Take 0.5 tablets by mouth once daily as needed.   Unknown    pen needle, diabetic (Pen Needle) 31 gauge x 3/16\" needle Use 1 pen needle to inject insulin nightly. 30 each 5      Canagliflozin, Hydrocodone-acetaminophen, Hydrocodone-guaifenesin, Penicillin, Strawberry, Strawberry extract, and Codeine  Social History     Tobacco Use    Smoking status: Every Day     Types: Cigarettes    Smokeless tobacco: Never   Vaping Use    Vaping status: Never Used   Substance Use Topics    Alcohol use: Never    Drug use: Never     No family history on file.    Review of Systems:  Review of Systems   Constitutional:  Positive for fatigue. Negative for chills, fever and unexpected weight change.   HENT:  Negative for congestion and trouble swallowing.    Respiratory:  Positive for cough, shortness of breath and wheezing.    Cardiovascular:  Negative for chest pain.   Gastrointestinal:  Negative for abdominal distention, abdominal pain, constipation, diarrhea, nausea and vomiting.   Genitourinary:  Negative for difficulty urinating.   Musculoskeletal:  Positive for back pain. Negative for arthralgias and joint swelling.   Skin:  Negative for color change.   Neurological:  Positive for headaches. Negative for dizziness, speech difficulty and light-headedness.   Psychiatric/Behavioral:  Negative for confusion and sleep disturbance.        Physical Exam:    Heart Rate:  []   Temp:  [36.1 °C (97 °F)-38 °C (100.4 °F)]   Resp:  [20-33]   BP: (121-185)/(64-83)   Height:  [157.4 cm (5' 1.97\")-160 cm (5' 3\")]   Weight:  [94.3 kg (208 lb)-96 kg " (211 lb 10.3 oz)]   SpO2:  [87 %-96 %]     Physical Exam  Constitutional:       General: She is awake.      Appearance: Normal appearance. She is ill-appearing.      Interventions: Face mask in place.   HENT:      Head: Normocephalic and atraumatic.      Nose: Nose normal.      Mouth/Throat:      Mouth: Mucous membranes are moist.   Eyes:      Pupils: Pupils are equal, round, and reactive to light.   Neck:      Thyroid: No thyroid mass.      Trachea: Phonation normal.   Cardiovascular:      Rate and Rhythm: Normal rate and regular rhythm.      Heart sounds: Normal heart sounds. No murmur heard.     No gallop.   Pulmonary:      Effort: Pulmonary effort is normal. Prolonged expiration present. No respiratory distress.      Breath sounds: Decreased breath sounds present. No wheezing, rhonchi or rales.   Abdominal:      General: Bowel sounds are normal. There is no distension.      Palpations: Abdomen is soft.      Tenderness: There is no abdominal tenderness.   Musculoskeletal:      Cervical back: Neck supple.      Right lower leg: No edema.      Left lower leg: No edema.      Comments: Limited mobility of the spine due to recent compression fracture, she is able to rotate in the bed but complains of pain   Skin:     General: Skin is warm.      Capillary Refill: Capillary refill takes less than 2 seconds.   Neurological:      General: No focal deficit present.      Mental Status: She is oriented to person, place, and time. Mental status is at baseline. She is lethargic.      GCS: GCS eye subscore is 4. GCS verbal subscore is 5. GCS motor subscore is 6.      Cranial Nerves: Cranial nerves 2-12 are intact.      Motor: Motor function is intact.   Psychiatric:         Attention and Perception: Attention and perception normal.         Mood and Affect: Mood normal.         Speech: Speech normal.         Behavior: Behavior normal.      Comments: Patient appearing tired but answering appropriately          Objective:  Labs:  Metabolic panel consistent with chronic hypercapnic respiratory failure, estimated pCO2 would be high 50s, mildly elevated hemoglobin at 16.1, very similar to prior measurements    Radiology:    Small focus of tree-in-bud opacities in the right upper lobe, otherwise CT scan does not look very impressive    Assessment:  Acute on chronic hypoxemic respiratory failure-currently BiPAP dependent, baseline is 2 L nasal cannula  Acute on chronic hypercapnic respiratory failure-currently BiPAP dependent  Influenza A  Acute exacerbation of COPD-Gold stage unknown, group D, chronically on Breo and DuoNeb treatments as an outpatient  Coronary artery disease status post stenting  Diabetes mellitus type 2  Hypertension  Hyperlipidemia  Diverticulosis  Obesity-BMI 38  Current everyday smoker-1 pack/day x 30 years  CODE STATUS-full code    Plan:  Transfer to ICU  Initiate AVAPS therapy  Decrease Solu-Medrol to 40 mg daily  Continue Tamiflu  Scheduled DuoNebs 4 times daily while awake  Continue Breo  No role for antibiotics at this point  Continue current diabetes management  Conservative management of compression fractures at this point  Supportive care    :  DVT Prophylaxis: Lovenox  GI Prophylaxis: Protonix  Bowel Regimen: Continue MiraLAX  Diet: N.p.o.  CVC: None  [place]: None  [place]: None  Restraints: None  Dispo: Transfer to ICU    Critical Care Time: 30 minutes    Raheem Monterroso MD, MSBS   Pulmonary/Critical Care  [address] [address]  Office: [phone]  Fax: [phone]  Cell: [phone]

## 2025-01-30 NOTE — PROGRESS NOTES
Estrella Melton is a 70 y.o. female on day 1 of admission presenting with Fall, initial encounter.      Subjective   Seen and examined   Lethargic   In respiratory distress  On BIPAP  No fever          Objective     Last Recorded Vitals  /67   Pulse 95   Temp 36.1 °C (97 °F)   Resp 23   Wt 96 kg (211 lb 10.3 oz)   SpO2 95%   Intake/Output last 3 Shifts:    Intake/Output Summary (Last 24 hours) at 1/30/2025 1057  Last data filed at 1/30/2025 0440  Gross per 24 hour   Intake --   Output 500 ml   Net -500 ml       Admission Weight  Weight: 94.3 kg (208 lb) (01/29/25 1122)    Daily Weight  01/29/25 : 96 kg (211 lb 10.3 oz)    Image Results  CT head W O contrast trauma protocol, CT cervical spine wo IV contrast  Addendum: Interpreted By:  Kizzy Childers,    ADDENDUM:   Findings were communicated with Dr. Mcghee by Dr. Kizzy Childers via   phone at 12:09 pm on 1/29/2025 with read back verification.        Signed by: Kizzy Childers 1/29/2025 12:13 PM        -------- ORIGINAL REPORT --------   Dictation workstation:   [id number]  Narrative: Interpreted By:  Kizzy Childers,   STUDY:  CT HEAD W/O CONTRAST TRAUMA PROTOCOL; CT CERVICAL SPINE WO IV  CONTRAST;  1/29/2025 11:39 am      INDICATION:  Signs/Symptoms:fall.          COMPARISON:  CT head 01/15/2013      ACCESSION NUMBER(S):  [id number]; [id number]      ORDERING CLINICIAN:  AGAPITO MCGHEE      TECHNIQUE:  Noncontrast axial CT scan of head was performed. Angled reformats in  brain and bone windows were generated. The images were reviewed in  bone, brain, blood and soft tissue windows. Noncontrast axial CT of  the cervical spine was obtained. Sagittal and coronal reformats were  generated.      FINDINGS:  CT HEAD:      CSF Spaces: The ventricles, sulci and basal cisterns are within  normal limits. There is no abnormal extraaxial fluid collection.      Parenchyma:  The grey-white differentiation is intact. No mass effect  or midline shift.  There is no  intracranial hemorrhage.      Calvarium: No calvarial fractures.      Paranasal sinuses and mastoids: Mild mucosal edema of the ethmoid  sinuses. Otherwise, the paranasal sinuses are clear. The bilateral  mastoid air cells are clear.          CT CERVICAL SPINE: Evaluation of the cervical spine is limited due to  patient motion.      Fractures: No acute fracture or traumatic malalignment.      Vertebral Alignment: There is straightening of the normal cervical  lordosis, likely positional versus muscle spasm.      Craniocervical Junction: The odontoid process and craniocervical  junction are intact.      Vertebrae/Disc Spaces: The cervical vertebral body heights are intact  and the disc spaces are preserved.      Prevertebral/Paraspinal Soft Tissues: The prevertebral and paraspinal  soft tissues are unremarkable.      Impression: 1. No acute intracranial abnormality.  2. Within limitations of patient motion, there is no acute fracture  or traumatic malalignment of the cervical spine.      MACRO:  None      Signed by: Kizzy Childers 1/29/2025 11:57 AM  Dictation workstation:   [id number]  CT chest abdomen pelvis w IV contrast  Narrative: Interpreted By:  Kizzy Childers,   STUDY:  CT CHEST ABDOMEN PELVIS W IV CONTRAST;  1/29/2025 11:53 am      INDICATION:  Signs/Symptoms:Trauma          COMPARISON:  CT abdomen pelvis 07/22/2013      ACCESSION NUMBER(S):  [id number]      ORDERING CLINICIAN:  AGAPITO MCGHEE      TECHNIQUE:  CT of the chest, abdomen, and pelvis was performed.  Contiguous axial images were obtained at  5 mm slice thickness  through the chest, and at  3 mm through the abdomen and pelvis.  Coronal and sagittal reconstructions at  3 mm slice thickness were  performed.  100 ML of Omnipaque 350 was administered intravenously  without immediate complication.      FINDINGS:  CHEST:      LUNG/PLEURA/LARGE AIRWAYS:  Clustered peribronchovascular ground-glass to consolidative opacities  seen in the posterior  right upper lobe. Atelectasis of the lingula  noted. No pleural effusion or pneumothorax.  Trachea and right and left main bronchi are patent.  No concerning lung nodule.      VESSELS:  Aorta and pulmonary arteries are normal caliber. Moderate  atherosclerotic changes of the aorta are identified. Moderate  coronary artery calcifications are present.      HEART:  The heart is normal in size. No pericardial effusion      MEDIASTINUM AND AMBER:  No mediastinal, hilar or axillary lymphadenopathy is present. The  esophagus is normal.      CHEST WALL AND LOWER NECK:  No soft tissue masses in the chest wall. The visualized thyroid gland  appears within normal limits.      ABDOMEN:      LIVER:  The liver is normal in size without evidence of focal liver lesions.  The liver is diffusely decreased in attenuation compatible with  hepatic steatosis.      BILE DUCTS:  No biliary dilatation.      GALLBLADDER:  Punctuate cholelithiasis without gallbladder wall thickening or  distention.      PANCREAS:  The pancreas appears unremarkable without evidence of ductal  dilatation or masses.      SPLEEN:  The spleen is normal in size.      ADRENAL GLANDS:  No adrenal nodule or thickening.      KIDNEYS AND URETERS:  The kidneys are normal in size and enhance symmetrically.  Hypoattenuating lesion in the left kidney, likely benign. No  hydroureteronephrosis or nephroureterolithiasis is identified.      PELVIS: Beam hardening artifact from right hip arthroplasty limits  evaluation in the pelvis.      BLADDER:  Within normal limits.      REPRODUCTIVE ORGANS:  Hysterectomy      BOWEL:  The stomach is unremarkable. The small and large bowel are normal in  caliber and demonstrate no wall thickening. Normal appendix. Suture  material is noted in the sigmoid colon.      VESSELS:  Moderate atherosclerotic calcifications of the aortoiliac arteries.  The IVC appears normal.  Portal vein, splenic vein, and SMV are patent.       PERITONEUM/RETROPERITONEUM/LYMPH NODES:  No ascites or fluid collection.  The retroperitoneum is unremarkable.  No abdominopelvic lymphadenopathy is present.      BONE AND SOFT TISSUE:  No suspicious osseous lesions are identified. Right hip arthroplasty.  No soft tissue masses in the abdominal wall.      Impression: 1. No acute traumatic abnormality in the chest, abdomen and pelvis.  2. Clustered peribronchovascular ground-glass to consolidative  opacities seen in the posterior right upper lobe, likely  bronchiolitis. Follow-up CT chest recommended in 3 months to ensure  resolution.  3. Hepatic steatosis, please correlate with liver function tests.      Findings were communicated with Dr. Mcghee by Dr. Kizzy Childers via  phone at 12:09 pm on 1/29/2025 with read back verification.      MACRO:  None      Signed by: Kizzy Childers 1/29/2025 12:13 PM  Dictation workstation:   [id number]  CT thoracic spine wo IV contrast, CT lumbar spine wo IV contrast  Narrative: Interpreted By:  Kizzy Childers,   STUDY:  CT LUMBAR SPINE WO IV CONTRAST; CT THORACIC SPINE WO IV CONTRAST  1/29/2025 11:55 am      INDICATION:  Signs/Symptoms:fall          COMPARISON:  None.      ACCESSION NUMBER(S):  [id number]; [id number]      ORDERING CLINICIAN:  AGAPITO MCGHEE      TECHNIQUE:  Axial CT images of the thoracic and lumbar spine are obtained. Axial,  coronal and sagittal reconstructions are provided for review.      FINDINGS:      CT THORACIC SPINE:      PARASPINAL SOFT TISSUES: No significant abnormality.      ALIGNMENT:  No traumatic malalignment or traumatic facet widening.      VERTEBRAE:  A grade 1 compression deformity of the T3 vertebral body  is noted, without retropulsion into the spinal canal.      SPINAL CANAL/INTERVERTEBRAL DISCS: No high-grade spinal canal  stenosis. No significant disc height loss.      VISUALIZED CHEST: No significant abnormality.          CT LUMBAR SPINE:      PARASPINAL SOFT TISSUES: No  significant abnormality.      ALIGNMENT:  No traumatic malalignment or traumatic facet widening.      VERTEBRAE:  No acute fracture. Vertebral body heights are maintained.      SPINAL CANAL/INTERVERTEBRAL DISCS: No high-grade spinal canal  stenosis. No significant disc height loss.      NEURAL FORAMINA: No significant neural stenosis. Varying degrees of  neuroforaminal stenosis, most significant at L4/L5 levels.      VISUALIZED ABDOMEN: No significant abnormality.          Impression: Grade 1 T3 vertebral body compression deformity without retropulsion  into the spinal canal, of uncertain chronicity. No other traumatic  abnormality of the thoracic and lumbar spine.      Findings were communicated with Dr. Mcghee by Dr. Kizzy Childers via  phone at 12:09 pm on 1/29/2025 with read back verification.      MACRO:  None      Signed by: Kizzy Childers 1/29/2025 12:12 PM  Dictation workstation:   [id number]  XR chest 1 view  Narrative: Interpreted By:  Kamala Vallejo,   STUDY:  XR CHEST 1 VIEW;  1/29/2025 11:35 am      INDICATION:  Signs/Symptoms:Trauma.          COMPARISON:  08/11/2021      ACCESSION NUMBER(S):  [id number]      ORDERING CLINICIAN:  AGAPITO MCGHEE      FINDINGS:  Artifact from overlying monitoring leads noted. Probable prominent  fat pad near the cardiac apex similar to the prior exam allowing for  differences in projection. No definite acute focal infiltrate or  pleural effusion. The cardiac silhouette is within normal limits for  size.      Impression: No definite acute cardiopulmonary process radiographically.      MACRO:  None.      Signed by: Kamala Vallejo 1/29/2025 11:38 AM  Dictation workstation:   [id number]      Physical Exam  Constitutional:       Appearance: She is obese.   HENT:      Head: Normocephalic and atraumatic.      Mouth/Throat:      Mouth: Mucous membranes are moist.   Eyes:      Extraocular Movements: Extraocular movements intact.      Pupils: Pupils are equal, round, and  reactive to light.   Cardiovascular:      Rate and Rhythm: Normal rate and regular rhythm.   Pulmonary:      Effort: Respiratory distress present.      Breath sounds: Wheezing present.   Abdominal:      General: Abdomen is flat. Bowel sounds are normal.      Palpations: Abdomen is soft.   Musculoskeletal:         General: Normal range of motion.      Cervical back: Normal range of motion and neck supple.   Neurological:      General: No focal deficit present.      Mental Status: She is alert. She is disoriented.   Psychiatric:         Mood and Affect: Mood normal.         Behavior: Behavior normal.             Assessment/Plan      Estrella Melton is a 70 y.o. female PMHx: Chronic respiratory failure, COPD, former smoker, hypertension, HLD, type 2 diabetes on insulin presented with status post mechanical fall yesterday, difficulty ambulating, and general malaise increased shortness of breath.  She stated she was on her porch and a mechanical fall.  Normally walks with walker.  Having difficulty ambulating today with significant pain in the back.  Patient required additional oxygen at 4 L nasal cannula she was hypoxic on admission.  Normally wears 2 L at home for COPD.     ER course: Grade 1 T3 vertebral compression deformity.  Per Dr. Huynh spoke with trauma team.  They did not feel there is any treatment at this time.    CT abdomen pelvis showed groundglass consolidative opacities in the posterior right upper lobe.  Likely bronchiolitis.  Recommend follow-up CT chest in 3 months.  Hepatic steatosis.  CT of head was negative.  CT of cervical spine negative, chest x-ray negative. Pt given Tamiflu, tylenol, 1L bolus and soldumedrol with improved symptoms.      Patient examined and seen. Alert and oriented x3, resting appears acutely ill. Patient denies chest pain, , palpitations, abdominal pain, fever or chills.  She does report general malaise, increased shortness of breath and mid lumbar pain with palpation. She  states her cough is dry, no sputum production, no fever. She fell yesterday on her porch and was able to get back into the house. She does wear 2L N/C at all times and quit smoking 2 weeks ago. She denies pain elsewhere and denies any significant cardiac history, no chest sisi      Assessment & Plan  Fall, initial encounter      # Acute on Chronic Respiratory Failure  # Influenza A  #COPD exacerbation    Seen and examined  Clinically worsening   In Acute distress   Lethargic   On respiratory distress   On BIPAP 20/8  ABGs are worsening   Transfer to ICU   Albuterol Atrovent neb  Tamiflu p.o. twice    #Diabetes type 2 controlled with insulin  Accu-Chek ACHS  Insulin sliding scale    #Status post mechanical fall  #T3 compression fracture  Lidoderm patch  Percocet p.o  PT OT evaluation    #Obesity  Lifestyle modification       #Hypertension   Lisinopril     # DVT.p : Lovenox daily     Critical care time 40 min          Alfred Munguia MD

## 2025-01-31 LAB
ALBUMIN SERPL BCP-MCNC: 3.8 G/DL (ref 3.4–5)
ALP SERPL-CCNC: 35 U/L (ref 33–136)
ALT SERPL W P-5'-P-CCNC: 21 U/L (ref 7–45)
ANION GAP SERPL CALC-SCNC: 14 MMOL/L (ref 10–20)
APPEARANCE UR: ABNORMAL
AST SERPL W P-5'-P-CCNC: 34 U/L (ref 9–39)
BILIRUB SERPL-MCNC: 0.3 MG/DL (ref 0–1.2)
BILIRUB UR STRIP.AUTO-MCNC: NEGATIVE MG/DL
BUN SERPL-MCNC: 24 MG/DL (ref 6–23)
CALCIUM SERPL-MCNC: 8.3 MG/DL (ref 8.6–10.3)
CHLORIDE SERPL-SCNC: 99 MMOL/L (ref 98–107)
CO2 SERPL-SCNC: 35 MMOL/L (ref 21–32)
COLOR UR: YELLOW
CREAT SERPL-MCNC: 0.94 MG/DL (ref 0.5–1.05)
EGFRCR SERPLBLD CKD-EPI 2021: 66 ML/MIN/1.73M*2
ERYTHROCYTE [DISTWIDTH] IN BLOOD BY AUTOMATED COUNT: 14.8 % (ref 11.5–14.5)
GLUCOSE BLD MANUAL STRIP-MCNC: 135 MG/DL (ref 74–99)
GLUCOSE BLD MANUAL STRIP-MCNC: 194 MG/DL (ref 74–99)
GLUCOSE BLD MANUAL STRIP-MCNC: 294 MG/DL (ref 74–99)
GLUCOSE BLD MANUAL STRIP-MCNC: 297 MG/DL (ref 74–99)
GLUCOSE SERPL-MCNC: 117 MG/DL (ref 74–99)
GLUCOSE UR STRIP.AUTO-MCNC: NORMAL MG/DL
HCT VFR BLD AUTO: 46.3 % (ref 36–46)
HGB BLD-MCNC: 14.4 G/DL (ref 12–16)
HOLD SPECIMEN: NORMAL
HYALINE CASTS #/AREA URNS AUTO: ABNORMAL /LPF
KETONES UR STRIP.AUTO-MCNC: NEGATIVE MG/DL
LEUKOCYTE ESTERASE UR QL STRIP.AUTO: NEGATIVE
MAGNESIUM SERPL-MCNC: 2.59 MG/DL (ref 1.6–2.4)
MCH RBC QN AUTO: 32.3 PG (ref 26–34)
MCHC RBC AUTO-ENTMCNC: 31.1 G/DL (ref 32–36)
MCV RBC AUTO: 104 FL (ref 80–100)
NITRITE UR QL STRIP.AUTO: NEGATIVE
NRBC BLD-RTO: 0 /100 WBCS (ref 0–0)
PH UR STRIP.AUTO: 6 [PH]
PLATELET # BLD AUTO: 194 X10*3/UL (ref 150–450)
POTASSIUM SERPL-SCNC: 4.7 MMOL/L (ref 3.5–5.3)
PROT SERPL-MCNC: 6.2 G/DL (ref 6.4–8.2)
PROT UR STRIP.AUTO-MCNC: ABNORMAL MG/DL
RBC # BLD AUTO: 4.46 X10*6/UL (ref 4–5.2)
RBC # UR STRIP.AUTO: NEGATIVE /UL
RBC #/AREA URNS AUTO: ABNORMAL /HPF
SODIUM SERPL-SCNC: 143 MMOL/L (ref 136–145)
SP GR UR STRIP.AUTO: 1.03
SQUAMOUS #/AREA URNS AUTO: ABNORMAL /HPF
UROBILINOGEN UR STRIP.AUTO-MCNC: ABNORMAL MG/DL
WBC # BLD AUTO: 8.4 X10*3/UL (ref 4.4–11.3)
WBC #/AREA URNS AUTO: ABNORMAL /HPF

## 2025-01-31 PROCEDURE — 2500000005 HC RX 250 GENERAL PHARMACY W/O HCPCS: Performed by: STUDENT IN AN ORGANIZED HEALTH CARE EDUCATION/TRAINING PROGRAM

## 2025-01-31 PROCEDURE — 2500000004 HC RX 250 GENERAL PHARMACY W/ HCPCS (ALT 636 FOR OP/ED): Performed by: HOSPITALIST

## 2025-01-31 PROCEDURE — 36415 COLL VENOUS BLD VENIPUNCTURE: CPT

## 2025-01-31 PROCEDURE — 99233 SBSQ HOSP IP/OBS HIGH 50: CPT | Performed by: STUDENT IN AN ORGANIZED HEALTH CARE EDUCATION/TRAINING PROGRAM

## 2025-01-31 PROCEDURE — 2500000002 HC RX 250 W HCPCS SELF ADMINISTERED DRUGS (ALT 637 FOR MEDICARE OP, ALT 636 FOR OP/ED): Performed by: HOSPITALIST

## 2025-01-31 PROCEDURE — 2500000001 HC RX 250 WO HCPCS SELF ADMINISTERED DRUGS (ALT 637 FOR MEDICARE OP): Performed by: HOSPITALIST

## 2025-01-31 PROCEDURE — 2500000004 HC RX 250 GENERAL PHARMACY W/ HCPCS (ALT 636 FOR OP/ED): Mod: JZ | Performed by: STUDENT IN AN ORGANIZED HEALTH CARE EDUCATION/TRAINING PROGRAM

## 2025-01-31 PROCEDURE — 2500000005 HC RX 250 GENERAL PHARMACY W/O HCPCS: Performed by: HOSPITALIST

## 2025-01-31 PROCEDURE — 83735 ASSAY OF MAGNESIUM: CPT

## 2025-01-31 PROCEDURE — 94640 AIRWAY INHALATION TREATMENT: CPT

## 2025-01-31 PROCEDURE — 82947 ASSAY GLUCOSE BLOOD QUANT: CPT

## 2025-01-31 PROCEDURE — 2500000001 HC RX 250 WO HCPCS SELF ADMINISTERED DRUGS (ALT 637 FOR MEDICARE OP): Performed by: STUDENT IN AN ORGANIZED HEALTH CARE EDUCATION/TRAINING PROGRAM

## 2025-01-31 PROCEDURE — 97165 OT EVAL LOW COMPLEX 30 MIN: CPT | Mod: GO

## 2025-01-31 PROCEDURE — 85027 COMPLETE CBC AUTOMATED: CPT

## 2025-01-31 PROCEDURE — 81001 URINALYSIS AUTO W/SCOPE: CPT | Performed by: STUDENT IN AN ORGANIZED HEALTH CARE EDUCATION/TRAINING PROGRAM

## 2025-01-31 PROCEDURE — 2500000001 HC RX 250 WO HCPCS SELF ADMINISTERED DRUGS (ALT 637 FOR MEDICARE OP): Performed by: NURSE PRACTITIONER

## 2025-01-31 PROCEDURE — 80053 COMPREHEN METABOLIC PANEL: CPT

## 2025-01-31 PROCEDURE — 97161 PT EVAL LOW COMPLEX 20 MIN: CPT | Mod: GP | Performed by: PHYSICAL THERAPIST

## 2025-01-31 PROCEDURE — 2500000002 HC RX 250 W HCPCS SELF ADMINISTERED DRUGS (ALT 637 FOR MEDICARE OP, ALT 636 FOR OP/ED): Performed by: STUDENT IN AN ORGANIZED HEALTH CARE EDUCATION/TRAINING PROGRAM

## 2025-01-31 PROCEDURE — 94660 CPAP INITIATION&MGMT: CPT

## 2025-01-31 PROCEDURE — 1100000001 HC PRIVATE ROOM DAILY

## 2025-01-31 PROCEDURE — 2500000002 HC RX 250 W HCPCS SELF ADMINISTERED DRUGS (ALT 637 FOR MEDICARE OP, ALT 636 FOR OP/ED)

## 2025-01-31 RX ORDER — PREDNISONE 20 MG/1
40 TABLET ORAL DAILY
Status: COMPLETED | OUTPATIENT
Start: 2025-02-01 | End: 2025-02-04

## 2025-01-31 RX ORDER — ACETAMINOPHEN 325 MG/1
650 TABLET ORAL ONCE
Status: COMPLETED | OUTPATIENT
Start: 2025-01-31 | End: 2025-01-31

## 2025-01-31 RX ADMIN — IPRATROPIUM BROMIDE AND ALBUTEROL SULFATE 3 ML: 2.5; .5 SOLUTION RESPIRATORY (INHALATION) at 08:11

## 2025-01-31 RX ADMIN — ENOXAPARIN SODIUM 40 MG: 40 INJECTION SUBCUTANEOUS at 08:51

## 2025-01-31 RX ADMIN — Medication 6 L/MIN: at 08:13

## 2025-01-31 RX ADMIN — BENZONATATE 100 MG: 100 CAPSULE ORAL at 15:17

## 2025-01-31 RX ADMIN — Medication 6 L/MIN: at 11:09

## 2025-01-31 RX ADMIN — BENZONATATE 100 MG: 100 CAPSULE ORAL at 08:51

## 2025-01-31 RX ADMIN — FLUTICASONE FUROATE AND VILANTEROL TRIFENATATE 1 PUFF: 100; 25 POWDER RESPIRATORY (INHALATION) at 10:49

## 2025-01-31 RX ADMIN — INSULIN LISPRO 6 UNITS: 100 INJECTION, SOLUTION INTRAVENOUS; SUBCUTANEOUS at 17:19

## 2025-01-31 RX ADMIN — ACETAMINOPHEN 650 MG: 325 TABLET ORAL at 23:04

## 2025-01-31 RX ADMIN — METHYLPREDNISOLONE SODIUM SUCCINATE 40 MG: 40 INJECTION, POWDER, FOR SOLUTION INTRAMUSCULAR; INTRAVENOUS at 08:51

## 2025-01-31 RX ADMIN — BENZONATATE 100 MG: 100 CAPSULE ORAL at 22:00

## 2025-01-31 RX ADMIN — INSULIN GLARGINE 10 UNITS: 100 INJECTION, SOLUTION SUBCUTANEOUS at 22:00

## 2025-01-31 RX ADMIN — Medication 6 L/MIN: at 21:53

## 2025-01-31 RX ADMIN — OSELTAMIVIR PHOSPHATE 75 MG: 75 CAPSULE ORAL at 22:00

## 2025-01-31 RX ADMIN — LISINOPRIL 5 MG: 5 TABLET ORAL at 06:22

## 2025-01-31 RX ADMIN — PANTOPRAZOLE SODIUM 40 MG: 40 TABLET, DELAYED RELEASE ORAL at 06:22

## 2025-01-31 RX ADMIN — SIMVASTATIN 40 MG: 40 TABLET, FILM COATED ORAL at 22:00

## 2025-01-31 RX ADMIN — IPRATROPIUM BROMIDE AND ALBUTEROL SULFATE 3 ML: 2.5; .5 SOLUTION RESPIRATORY (INHALATION) at 23:14

## 2025-01-31 RX ADMIN — INSULIN LISPRO 2 UNITS: 100 INJECTION, SOLUTION INTRAVENOUS; SUBCUTANEOUS at 11:47

## 2025-01-31 RX ADMIN — POLYETHYLENE GLYCOL 3350 17 G: 17 POWDER, FOR SOLUTION ORAL at 08:51

## 2025-01-31 RX ADMIN — SODIUM CHLORIDE 75 ML/HR: 9 INJECTION, SOLUTION INTRAVENOUS at 04:34

## 2025-01-31 RX ADMIN — IPRATROPIUM BROMIDE AND ALBUTEROL SULFATE 3 ML: 2.5; .5 SOLUTION RESPIRATORY (INHALATION) at 11:05

## 2025-01-31 RX ADMIN — INSULIN LISPRO 6 UNITS: 100 INJECTION, SOLUTION INTRAVENOUS; SUBCUTANEOUS at 22:00

## 2025-01-31 RX ADMIN — IPRATROPIUM BROMIDE AND ALBUTEROL SULFATE 3 ML: 2.5; .5 SOLUTION RESPIRATORY (INHALATION) at 15:27

## 2025-01-31 RX ADMIN — LIDOCAINE 4% 1 PATCH: 40 PATCH TOPICAL at 08:51

## 2025-01-31 RX ADMIN — OSELTAMIVIR PHOSPHATE 75 MG: 75 CAPSULE ORAL at 08:51

## 2025-01-31 ASSESSMENT — COGNITIVE AND FUNCTIONAL STATUS - GENERAL
CLIMB 3 TO 5 STEPS WITH RAILING: A LITTLE
DRESSING REGULAR LOWER BODY CLOTHING: A LOT
PERSONAL GROOMING: A LITTLE
STANDING UP FROM CHAIR USING ARMS: A LITTLE
TOILETING: A LITTLE
MOVING TO AND FROM BED TO CHAIR: A LITTLE
DRESSING REGULAR UPPER BODY CLOTHING: A LITTLE
MOVING FROM LYING ON BACK TO SITTING ON SIDE OF FLAT BED WITH BEDRAILS: A LITTLE
HELP NEEDED FOR BATHING: A LITTLE
DRESSING REGULAR LOWER BODY CLOTHING: A LITTLE
TURNING FROM BACK TO SIDE WHILE IN FLAT BAD: A LOT
HELP NEEDED FOR BATHING: A LOT
STANDING UP FROM CHAIR USING ARMS: A LITTLE
WALKING IN HOSPITAL ROOM: A LOT
TOILETING: A LOT
WALKING IN HOSPITAL ROOM: A LITTLE
MOBILITY SCORE: 21
DAILY ACTIVITIY SCORE: 19
CLIMB 3 TO 5 STEPS WITH RAILING: TOTAL
DRESSING REGULAR UPPER BODY CLOTHING: A LITTLE
MOBILITY SCORE: 14
DAILY ACTIVITIY SCORE: 16
PERSONAL GROOMING: A LITTLE

## 2025-01-31 ASSESSMENT — ACTIVITIES OF DAILY LIVING (ADL): BATHING_ASSISTANCE: MODERATE

## 2025-01-31 ASSESSMENT — PAIN DESCRIPTION - LOCATION: LOCATION: HEAD

## 2025-01-31 ASSESSMENT — PAIN SCALES - GENERAL
PAINLEVEL_OUTOF10: 0 - NO PAIN
PAINLEVEL_OUTOF10: 5 - MODERATE PAIN

## 2025-01-31 ASSESSMENT — PAIN - FUNCTIONAL ASSESSMENT
PAIN_FUNCTIONAL_ASSESSMENT: 0-10

## 2025-01-31 ASSESSMENT — PAIN DESCRIPTION - ORIENTATION: ORIENTATION: RIGHT;LOWER;MID

## 2025-01-31 NOTE — PROGRESS NOTES
[facility] Critical Care Medicine       Date:  1/31/2025  Patient:  Estrella Melton  YOB: 1955  MRN:  [id number]   Admit Date:  1/29/2025  ========================================================================================================    Chief Complaint   Patient presents with    Fall         History of Present Illness:  Estrella Melton is a 70 y.o. year old female patient who presented with complaints of back pain after having a mechanical fall at home.  The patient has multiple comorbidities including COPD, diabetes and coronary artery disease.  Patient states that she has been feeling poorly over the last few days and having increased shortness of breath associated with malaise.  She was standing on her porch when she slipped and had a mechanical fall.  Normally she walks with the aid of a walker.  Ever since the fall she has been having more difficulty with walking and now having acute back pain.  In the emergency department she had imaging of the spine performed as a minor trauma evaluation which showed evidence of a new T3 compression fracture.  Conservative management was recommended at this time.  She was then swabbed for flu and came back positive.  Imaging of the chest revealed small tree-in-bud opacities in the right upper lobe otherwise no significant findings.  She was admitted to the hospital for further evaluation and management.     The patient was on IV steroids and scheduled bronchodilator when she became more lethargic and confused.  She was placed on BiPAP support.  This morning the patient's lethargy was only minimally improved with no significant improvement in the patient's blood gas which showed a acute on chronic respiratory acidosis.  Based on prior blood work, the patient's estimated pCO2 should be in the high 50s, currently greater than 80.  When I arrived at bedside the patient was getting cleaned up and able to answer most questions for me.  She states that she feels  short of breath and is tired.  She does think that the BiPAP mask is helping.  She does not have a BiPAP at home.      Interval ICU Events:  1/31: Patient seen and examined.  With 24 hours of continued treatment and escalation of her BiPAP support to AVAPS, the patient had rapid improvement.  She has been most of the day wearing the AVAPS mask but was able to tolerate nasal cannula when taking breaks for meals.  She is on nasal cannula this morning and seems to be at her baseline.  She still has fairly significant wheezing on exam.  She was able to get up to a chair with some assistance.  She is not ready to go home just yet.  Otherwise hemodynamically stable and afebrile.  Currently on 4-5 L nasal cannula saturating in the mid 90s.    Medical History:  Past Medical History:   Diagnosis Date    Acute respiratory failure with hypercapnia (Multi) 10/10/2013    Arthritis     Chronic pain disorder     Congestion of upper airway     COPD (chronic obstructive pulmonary disease) (Multi)     Coronary artery disease     Diverticulosis     Dysfunctional uterine bleeding     Hyperlipidemia     Hypertension     Lumbar disc disease     Oxygen dependent     PRN 2L    Type 2 diabetes mellitus (Multi)      Past Surgical History:   Procedure Laterality Date    BOWEL RESECTION      COLON RESECTION-DIVERTICULITIS    CARPAL TUNNEL RELEASE Right     COLONOSCOPY      CORONARY ANGIOPLASTY WITH STENT PLACEMENT      DILATION AND CURETTAGE OF UTERUS      HERNIA REPAIR      HIP ARTHROPLASTY Right     HYSTERECTOMY       Medications Prior to Admission   Medication Sig Dispense Refill Last Dose/Taking    Breo Ellipta 100-25 mcg/dose inhaler Inhale 1 puff once daily.   1/28/2025    diclofenac (Voltaren) 50 mg EC tablet Take 1 tablet (50 mg) by mouth 2 times a day as needed for pain.   Unknown    insulin glargine (Lantus Solostar U-100 Insulin) 100 unit/mL (3 mL) pen Inject 15 units subcutaneously nightly 3 mL 0 1/28/2025 Bedtime    lisinopril 5  "mg tablet Take 1 tablet (5 mg) by mouth early in the morning..   1/28/2025 Morning    metFORMIN (Glucophage) 1,000 mg tablet Take 1 tablet (1,000 mg) by mouth 2 times daily (morning and late afternoon). 60 tablet 0 1/28/2025 Evening    simvastatin (Zocor) 40 mg tablet Take 1 tablet (40 mg) by mouth once daily at bedtime. 30 tablet 0 1/28/2025 Bedtime    albuterol 90 mcg/actuation inhaler Inhale 2 puffs every 6 hours if needed for wheezing.   Unknown    cyclobenzaprine (Flexeril) 10 mg tablet Take 1 tablet (10 mg) by mouth 2 times a day as needed for muscle spasms.   Unknown    guaiFENesin (Mucinex) 1,200 mg tablet extended release 12hr Take 1 tablet (1,200 mg) by mouth 2 times a day as needed.   Unknown    ipratropium-albuteroL (Duo-Neb) 0.5-2.5 mg/3 mL nebulizer solution Take 3 mL by nebulization every 6 hours if needed for wheezing or shortness of breath.   Unknown    lisinopril 10 mg tablet Take 1 tablet (10 mg) by mouth once daily. 30 tablet 0     oxyCODONE-acetaminophen (Percocet) 7.5-325 mg tablet Take 0.5 tablets by mouth once daily as needed.   Unknown    pen needle, diabetic (Pen Needle) 31 gauge x 3/16\" needle Use 1 pen needle to inject insulin nightly. 30 each 5      Canagliflozin, Hydrocodone-acetaminophen, Hydrocodone-guaifenesin, Penicillin, Strawberry, Strawberry extract, and Codeine  Social History     Tobacco Use    Smoking status: Every Day     Types: Cigarettes    Smokeless tobacco: Never   Vaping Use    Vaping status: Never Used   Substance Use Topics    Alcohol use: Never    Drug use: Never     No family history on file.    Physical Exam:    Heart Rate:  [76-96]   Temp:  [36 °C (96.8 °F)-36.4 °C (97.5 °F)]   Resp:  [17-38]   BP: ()/(42-68)   Weight:  [94.7 kg (208 lb 12.4 oz)]   SpO2:  [91 %-100 %]     Physical Exam  Constitutional:       General: She is awake. She is not in acute distress.     Appearance: Normal appearance. She is obese. She is not ill-appearing.      Interventions: Nasal " cannula in place.      Comments: More awake today compared to yesterday   HENT:      Head: Normocephalic and atraumatic.      Right Ear: Decreased hearing noted.      Left Ear: Decreased hearing noted.      Nose: Nose normal.      Mouth/Throat:      Mouth: Mucous membranes are moist.   Eyes:      Pupils: Pupils are equal, round, and reactive to light.   Neck:      Thyroid: No thyroid mass.      Trachea: Phonation normal.   Cardiovascular:      Rate and Rhythm: Normal rate and regular rhythm.      Heart sounds: Normal heart sounds. No murmur heard.     No gallop.   Pulmonary:      Effort: Pulmonary effort is normal. Prolonged expiration present. No respiratory distress.      Breath sounds: Decreased breath sounds and wheezing present. No rhonchi or rales.      Comments: End expiratory wheezing prominent  Abdominal:      General: Bowel sounds are normal. There is no distension.      Palpations: Abdomen is soft.      Tenderness: There is no abdominal tenderness.   Musculoskeletal:      Cervical back: Neck supple.      Right lower leg: No edema.      Left lower leg: No edema.   Skin:     General: Skin is warm.      Capillary Refill: Capillary refill takes less than 2 seconds.   Neurological:      General: No focal deficit present.      Mental Status: She is alert and oriented to person, place, and time. Mental status is at baseline.      GCS: GCS eye subscore is 4. GCS verbal subscore is 5. GCS motor subscore is 6.      Cranial Nerves: Cranial nerves 2-12 are intact.      Motor: Motor function is intact.   Psychiatric:         Attention and Perception: Attention and perception normal.         Mood and Affect: Mood normal.         Speech: Speech normal.         Behavior: Behavior normal.         Objective:  Labs:  Metabolic panel similar to yesterday's, CBC not significantly different, no leukocytosis    Radiology:  No new chest imaging    Assessment:  Acute on chronic hypoxemic respiratory failure-currently BiPAP  dependent, baseline is 2 L nasal cannula  Acute on chronic hypercapnic respiratory failure-currently BiPAP dependent  Influenza A  Acute exacerbation of COPD-Gold stage unknown, group D, chronically on Breo and DuoNeb treatments as an outpatient  Coronary artery disease status post stenting  Diabetes mellitus type 2  Hypertension  Hyperlipidemia  Diverticulosis  Obesity-BMI 38  Current everyday smoker-1 pack/day x 30 years  CODE STATUS-full code     Plan:  Patient tolerating nasal cannula  Should continue BiPAP support at night  Transition solumedrol to prednisone to complete 5 days of steroids  Continue Tamiflu  Scheduled DuoNebs 4 times daily while awake  Continue Breo  No role for antibiotics at this point  Continue current diabetes management  Conservative management of compression fractures at this point, I suspect that these are more chronic in nature as she is not complaining of any significant back pain  Would anticipate discharge in the next day or so  Patient would definitely benefit from follow-up with pulmonary medicine as an outpatient  Supportive care     :  DVT Prophylaxis: Lovenox  GI Prophylaxis: Protonix  Bowel Regimen: Continue MiraLAX  Diet: N.p.o.  CVC: None  Walton: None  Borja: None  Restraints: None  Dispo: Transfer out of ICU    Critical Care Time: 0 minutes    Raheem Monterroso MD, MSBS   Pulmonary/Critical Care  [address]  [place] [address]  Office: [phone]  Fax: [phone]  Cell: [phone]

## 2025-01-31 NOTE — PROGRESS NOTES
01/31/25 1814   Discharge Planning   Living Arrangements Alone   Support Systems Family members   Assistance Needed Lives alone in a 2 story home with 1st floor set up and laundry on 1st floor. 3-4 steps to enter with HR. Tub shower with seat and suction cup safety bars. Owns SPC and rollator walker.   Prior Function:  Prior Function Comments: Mod I with rollator or SPC. Indep. with ADLs and IADLs, however, IADLs have become more difficult recently. Pt. does not have reliable assistance available, states cleaning and laundry do not really get done, but she is able to make herself meals. 1 recent fall (just PTA). [place]. Uses 2L home O2.   Type of Residence Private residence   Home or Post Acute Services Post acute facilities (Rehab/SNF/etc)   Type of Post Acute Facility Services Skilled nursing;Rehab   Expected Discharge Disposition SNF   Does the patient need discharge transport arranged? Yes   RoundTrip coordination needed? Yes   Has discharge transport been arranged? No   Patient Choice   Provider Choice list and CMS website (https://medicare.gov/care-compare#search) for post-acute Quality and Resource Measure Data were provided and reviewed with: Patient   Patient / Family choosing to utilize agency / facility established prior to hospitalization No   Stroke Family Assessment   Stroke Family Assessment Needed No   Intensity of Service   Intensity of Service 0-30 min     Per chart review: Pt presented to ED with c/o back pain after having a mechanical fall at home. PMH of COPD, diabetes and coronary artery disease. Patient stated that she has been feeling poorly over the last few days and having increased shortness of breath associated with malaise. She was standing on her porch when she slipped and had a mechanical fall. Normally she walks with the aid of a walker.   Orthopedics c/s for compression fracture- no surgical intervention and pt is WBAT. Therapy evaluations completed.    Care transitions team to  follow up for dc planning . Anticipate snf vs acute rehab.  Pt will require precert.

## 2025-01-31 NOTE — PROGRESS NOTES
Occupational Therapy    Evaluation    Patient Name: Estrella Melton  MRN: [id number]  Department: [facility]  Room: 10/10-A  Today's Date: 1/31/2025  Time Calculation  Start Time: 1033  Stop Time: 1048  Time Calculation (min): 15 min    Assessment  IP OT Assessment  End of Session Communication: Bedside nurse  End of Session Patient Position: Up in chair, Alarm off, not on at start of session (all needs in reach)    Plan:  Treatment Interventions: ADL retraining, Functional transfer training, Endurance training, UE strengthening/ROM, Patient/family training, Neuromuscular reeducation, Compensatory technique education, Equipment evaluation/education  OT Frequency: 3 times per week  OT Discharge Recommendations: Moderate intensity level of continued care, High intensity level of continued care  Equipment Recommended upon Discharge: Wheeled walker  OT - OK to Discharge: Yes (when medically appropriate)    Subjective   Current Problem:  1. Fall, initial encounter        2. Compression fracture of T3 vertebra, initial encounter        3. COPD with acute exacerbation (Multi)        4. Influenza A        5. Acute on chronic respiratory failure with hypoxia (Multi)          General:  General  Reason for Referral: ADL  Referred By: Ezra  Past Medical History Relevant to Rehab: Includes: COPD, HTN, morbid obesity, HLD, OA, DM, L spine DDD, chronic back pain, R NATE, chronic respiratory failure, bowel resection, diverticulosis  Family/Caregiver Present: No  Prior to Session Communication: Bedside nurse  Patient Position Received: Up in chair, Alarm off, not on at start of session  General Comment: To ED 1/29 s/p fall 1 day PTA, c/o back pain, /81  bpm, Temp 100.4 degrees. Pt. found to have Inflenza A and T3 compression fx; pt. transferred to ICU on 1/30 due to declining respiratory status and lethargy; PCO2 84; placed on Bipap. Diagnostics on admit: CT head (-) acute, CT C-spine, T-spine and L-spine: age indeterminate  T3 compression deformity. CT Chest/Abd/pelvis: Clustered peribronchovascular ground-glass to consolidative opacities seen in the posterior right upper lobe, likely bronchiolitis. Hepatic Steatosis.  Precautions:  Medical Precautions: Fall precautions, Oxygen therapy device and L/min, Infection precautions (6L O2)  Precautions Comment: Droplet Precautions for Flu A    Vital Signs Comment: BP 90/54; HR 88 bpm; O2 sat 96% at rest; down to 91% with minimal ambulation    Pain:  Pain Assessment  Pain Assessment: 0-10  0-10 (Numeric) Pain Score:  (R shoulder soreness with AROM, states that this is new since falling prior to admission)    Objective   Cognition:  Overall Cognitive Status: Within Functional Limits  Orientation Level: Oriented X4  Processing Speed: Delayed      Home Living:  Home Living Comments: Lives alone in a 2 story home with 1st floor set up and laundry on 1st floor. 3-4 steps to enter with HR. Tub shower with seat and suction cup safety bars. Owns SPC and rollator walker.   Prior Function:  Prior Function Comments: Mod I with rollator or SPC. Indep. with ADLs and IADLs, however, IADLs have become more difficult recently. Pt. does not have reliable assistance available, states cleaning and laundry do not really get done, but she is able to make herself meals. 1 recent fall (just PTA). [place]. Uses 2L home O2.    ADL:  Eating Assistance: Independent  Grooming Assistance: Minimal  Bathing Assistance: Moderate  UE Dressing Assistance: Minimal  LE Dressing Assistance: Maximal  Toileting Assistance with Device: Moderate    Activity Tolerance:  Endurance: Decreased tolerance for upright activites  Activity Tolerance Comments: c/o lightheadeness, mild GUTIERREZ and quick fatigue with standing activity  Bed Mobility/Transfers:      Transfers  Transfer:  (Min assist sit<>stand at recliner with FWW)    Functional Mobility:  Functional Mobility  Functional Mobility Performed:  (Pt. ambulated ~ 8' with min to mod assist  with FWW.  Mild anterior/posterior swaying and posterior LOB.)  Sitting Balance:  Static Sitting Balance  Static Sitting-Comment/Number of Minutes: Good  Dynamic Sitting Balance  Dynamic Sitting-Comments: Good (-)  Standing Balance:  Static Standing Balance  Static Standing-Comment/Number of Minutes: Fair/fair (-)  Dynamic Standing Balance  Dynamic Standing-Comments: fair (-)/poor (+)    Strength:  Strength Comments: R shoulder not tested due to soreness with AROM. Distal RUE MMT 4/5. LUE MMT 4/5.     Extremities: RUE   RUE :  (R shoulder ~ 90 degrees active flexion and 160 degrees passive flexion. Distal RUE AROM WFL.) and LUE   LUE:  (LUE AROM WFL)    Outcome Measures: [facility] Daily Activity  Putting on and taking off regular lower body clothing: A lot  Bathing (including washing, rinsing, drying): A lot  Putting on and taking off regular upper body clothing: A little  Toileting, which includes using toilet, bedpan or urinal: A lot  Taking care of personal grooming such as brushing teeth: A little  Eating Meals: None  Daily Activity - Total Score: 16      Education Documentation  Body Mechanics, taught by Lisha Jorgensen OT at 1/31/2025  1:55 PM.  Learner: Patient  Readiness: Acceptance  Method: Explanation  Response: Verbalizes Understanding    Precautions, taught by Lisha Jorgensen OT at 1/31/2025  1:55 PM.  Learner: Patient  Readiness: Acceptance  Method: Explanation  Response: Verbalizes Understanding    ADL Training, taught by Lisha Jorgensen OT at 1/31/2025  1:55 PM.  Learner: Patient  Readiness: Acceptance  Method: Explanation  Response: Verbalizes Understanding    Education Comments  No comments found.      Goals:   Encounter Problems       Encounter Problems (Active)       OT Goals       Mod I sit/stand, bed/chair/commode with FWW.        Start:  01/31/25    Expected End:  02/14/25            Mod I ADL functional mobility with FWW.        Start:  01/31/25    Expected End:  02/14/25             Good dynamic standing balance for ADL.        Start:  01/31/25    Expected End:  02/14/25            Tolerate 8 mins light functional mobility with stable vitals.        Start:  01/31/25    Expected End:  02/14/25            Mod I toileting.        Start:  01/31/25    Expected End:  02/14/25

## 2025-01-31 NOTE — CARE PLAN
Problem: Pain - Adult  Goal: Verbalizes/displays adequate comfort level or baseline comfort level  Outcome: Progressing     Problem: Safety - Adult  Goal: Free from fall injury  Outcome: Progressing     Problem: Discharge Planning  Goal: Discharge to home or other facility with appropriate resources  Outcome: Progressing     Problem: Chronic Conditions and Co-morbidities  Goal: Patient's chronic conditions and co-morbidity symptoms are monitored and maintained or improved  Outcome: Progressing     Problem: Nutrition  Goal: Nutrient intake appropriate for maintaining nutritional needs  Outcome: Progressing     Problem: Skin  Goal: Decreased wound size/increased tissue granulation at next dressing change  Outcome: Progressing  Flowsheets (Taken 1/30/2025 1939)  Decreased wound size/increased tissue granulation at next dressing change: Promote sleep for wound healing  Goal: Participates in plan/prevention/treatment measures  Outcome: Progressing  Flowsheets (Taken 1/30/2025 1939)  Participates in plan/prevention/treatment measures: Elevate heels  Goal: Prevent/manage excess moisture  Outcome: Progressing  Flowsheets (Taken 1/30/2025 1939)  Prevent/manage excess moisture: Cleanse incontinence/protect with barrier cream  Goal: Prevent/minimize sheer/friction injuries  Outcome: Progressing  Flowsheets (Taken 1/30/2025 1939)  Prevent/minimize sheer/friction injuries: Use pull sheet  Goal: Promote/optimize nutrition  Outcome: Progressing  Flowsheets (Taken 1/30/2025 1939)  Promote/optimize nutrition: Monitor/record intake including meals  Goal: Promote skin healing  Outcome: Progressing  Flowsheets (Taken 1/30/2025 1939)  Promote skin healing: Assess skin/pad under line(s)/device(s)     Problem: Fall/Injury  Goal: Not fall by end of shift  Outcome: Progressing  Goal: Be free from injury by end of the shift  Outcome: Progressing  Goal: Verbalize understanding of personal risk factors for fall in the hospital  Outcome:  Progressing  Goal: Verbalize understanding of risk factor reduction measures to prevent injury from fall in the home  Outcome: Progressing  Goal: Use assistive devices by end of the shift  Outcome: Progressing  Goal: Pace activities to prevent fatigue by end of the shift  Outcome: Progressing     Problem: Pain  Goal: Takes deep breaths with improved pain control throughout the shift  Outcome: Progressing    The clinical goals for the shift include pts sp02 will remain greater than 90% for the remainder of the shift

## 2025-01-31 NOTE — PROGRESS NOTES
Physical Therapy    Physical Therapy Evaluation    Patient Name: Estrella Melton  MRN: [id number]  Today's Date: 1/31/2025   Time Calculation  Start Time: 1035  Stop Time: 1047  Time Calculation (min): 12 min  10/10-A    Assessment/Plan   PT Assessment  PT Assessment Results: Decreased endurance, Decreased strength, Impaired balance, Decreased mobility, Obesity  Rehab Prognosis: Good  Barriers to Discharge Home: Caregiver assistance, Physical needs  Caregiver Assistance: Patient lives alone and/or does not have reliable caregiver assistance  Physical Needs: Stair navigation into home limited by function/safety  End of Session Communication: Bedside nurse  Assessment Comment: Pt. requires minAx1 for transfers and short distance ambulation. Pt. would benefit from continued PT to increase mobility and indep.  End of Session Patient Position: Up in chair, Alarm off, not on at start of session  IP OR SWING BED PT PLAN  Inpatient or Swing Bed: Inpatient  PT Plan  Treatment/Interventions: Bed mobility, Transfer training, Gait training, Stair training, Balance training, Strengthening, Endurance training, Therapeutic exercise, Therapeutic activity, Home exercise program  PT Plan: Ongoing PT  PT Frequency: 3 times per week  PT Discharge Recommendations: Moderate intensity level of continued care (Pt. lives alone.)  Equipment Recommended upon Discharge: Wheeled walker  PT Recommended Transfer Status: Assist x1, Assistive device (ww)  PT - OK to Discharge: Yes (to next level of care when medically stable)    Subjective     Current Problem:  1. Fall, initial encounter        2. Compression fracture of T3 vertebra, initial encounter        3. COPD with acute exacerbation (Multi)        4. Influenza A        5. Acute on chronic respiratory failure with hypoxia (Multi)              General Visit Information:  General  Reason for Referral: Impaired mobility  Referred By: Abisai SAUNDERS  Past Medical History Relevant to Rehab: COPD, HTN,  "morbid obesity, HLD, OA, DM, L spine DDD, chronic back pain, R NATE, chronic respiratory failure, bowel resection, diverticulosis  Family/Caregiver Present: No  Prior to Session Communication: Bedside nurse  Patient Position Received: Up in chair, Alarm off, not on at start of session (6LO2, tele)  General Comment: To ED 1/29 s/p fall 1 day PTA, c/o back pain, /81  bpm, Temp 100.4 degrees. Pt. found to have Inflenza A and T3 compression fx; pt. transferred to ICU on 1/30 due to declining respiratory status  and lethargy; PCO2 84; placed on Bipap. Diagnostics on admit: CT head (-) acute, CT C-spine, T-spine and L-spine- age indeterminate T3 compression deformity. CT Chest/Abd/pelvis- Clustered peribronchovascular ground-glass to consolidative  opacities seen in the posterior right upper lobe, likely  bronchiolitis. Hepatic Steatosis    Home Living:  Home Living  Home Living Comments: Lives alone in 2 story home with 1st floor set up and laundry on 1st floor. 3-4 AAMIR with HR. Tub shower with seat and GBs. Owns SPC and rollator walker.    Prior Level of Function:  Prior Function Per Pt/Caregiver Report  Prior Function Comments: Mod. indep. with rollator or SPC. Indep. with ADLs and IADLs, however, IADLs have become more difficult recently. Pt. does not have reliable assistance available. 1 recent fall (just PTA). +drvies. Home O2- uses 2LO2.    Precautions:  Precautions  Medical Precautions: Fall precautions, Oxygen therapy device and L/min, Infection precautions  Precautions Comment: Droplet Precautions for FluA    Vital Signs:  Vital Signs  Vital Signs Comment: BP 90/54; HR 88 bpm; O2 sat 96% at rest; down to 91% with minimal ambulation  Objective     Pain:  Pain Assessment  Pain Assessment: 0-10  0-10 (Numeric) Pain Score:  (0 at rest; RUE \"soreness\" with AROM)    Cognition:  Cognition  Overall Cognitive Status: Within Functional Limits  Orientation Level: Oriented X4  Processing Speed: Delayed    General " "Assessments:      Activity Tolerance  Endurance: Decreased tolerance for upright activites  Activity Tolerance Comments: c/o lightheadeness; mild GUTIERREZ, quick fatigue     Strength  Strength Comments: B/L hip flex 4/5, B/L Quads and ankle DF 4+/5                   Functional Assessments:     Bed Mobility  Bed Mobility: No  Transfers  Transfer:  (sit to/from stand with wide ww and minAx1; cues for safe hand placement)  Ambulation/Gait Training  Ambulation/Gait Training Performed:  (Amb. 3 ft forward, 3 ft retro stepping with wide ww and minAx1; limited by c/o lightheadedness; slow, slightly unsteady gait with retro LOB. Mild GUTIERREZ)          Extremity/Trunk Assessments:  RUE   RUE :  (AAROM WFL; \"soreness\" in shoulder d/t fall, per pt.)  LUE   LUE: Within Functional Limits  RLE   RLE : Within Functional Limits  LLE   LLE : Within Functional Limits    Outcome Measures:     [facility] Basic Mobility  Turning from your back to your side while in a flat bed without using bedrails: A little  Moving from lying on your back to sitting on the side of a flat bed without using bedrails: A lot  Moving to and from bed to chair (including a wheelchair): A little  Standing up from a chair using your arms (e.g. wheelchair or bedside chair): A little  To walk in hospital room: A lot  Climbing 3-5 steps with railing: Total  Basic Mobility - Total Score: 14                                                             Goals:  Encounter Problems       Encounter Problems (Active)       Impaired mobility        Perform all bed mobility with mod. indep.        Start:  01/31/25    Expected End:  02/14/25            Perform all transfers with ww and mod. indep., managing O2 line indep.         Start:  01/31/25    Expected End:  02/14/25            Patient will ambulate 50 ft with ww and mod. indep., managing O2 line indep.        Start:  01/31/25    Expected End:  02/14/25            Patient will ascend/descend 4 steps with HR and SPC, if needed, with " [id number]       Start:  01/31/25    Expected End:  02/14/25            Patient will perform BLE HEP with indep. x 10-20 reps x 1-2 sets        Start:  01/31/25    Expected End:  02/14/25               Pain - Adult            Education Documentation  Mobility Training, taught by Florecita Rowell, PT at 1/31/2025  1:47 PM.  Learner: Patient  Readiness: Acceptance  Method: Explanation, Demonstration  Response: Verbalizes Understanding, Demonstrated Understanding  Comment: see note    Education Comments  No comments found.

## 2025-02-01 ENCOUNTER — APPOINTMENT (OUTPATIENT)
Dept: CARDIOLOGY | Facility: HOSPITAL | Age: 70
DRG: 193 | End: 2025-02-01
Payer: MEDICARE

## 2025-02-01 LAB
ATRIAL RATE: 110 BPM
GLUCOSE BLD MANUAL STRIP-MCNC: 115 MG/DL (ref 74–99)
GLUCOSE BLD MANUAL STRIP-MCNC: 232 MG/DL (ref 74–99)
GLUCOSE BLD MANUAL STRIP-MCNC: 242 MG/DL (ref 74–99)
GLUCOSE BLD MANUAL STRIP-MCNC: 286 MG/DL (ref 74–99)
P AXIS: 73 DEGREES
P OFFSET: 198 MS
P ONSET: 148 MS
PR INTERVAL: 158 MS
Q ONSET: 227 MS
QRS COUNT: 18 BEATS
QRS DURATION: 78 MS
QT INTERVAL: 334 MS
QTC CALCULATION(BAZETT): 452 MS
QTC FREDERICIA: 409 MS
R AXIS: 72 DEGREES
T AXIS: 72 DEGREES
T OFFSET: 394 MS
VENTRICULAR RATE: 110 BPM

## 2025-02-01 PROCEDURE — 94640 AIRWAY INHALATION TREATMENT: CPT

## 2025-02-01 PROCEDURE — 99232 SBSQ HOSP IP/OBS MODERATE 35: CPT | Performed by: STUDENT IN AN ORGANIZED HEALTH CARE EDUCATION/TRAINING PROGRAM

## 2025-02-01 PROCEDURE — 2500000005 HC RX 250 GENERAL PHARMACY W/O HCPCS: Performed by: STUDENT IN AN ORGANIZED HEALTH CARE EDUCATION/TRAINING PROGRAM

## 2025-02-01 PROCEDURE — 2500000002 HC RX 250 W HCPCS SELF ADMINISTERED DRUGS (ALT 637 FOR MEDICARE OP, ALT 636 FOR OP/ED): Performed by: STUDENT IN AN ORGANIZED HEALTH CARE EDUCATION/TRAINING PROGRAM

## 2025-02-01 PROCEDURE — 2500000001 HC RX 250 WO HCPCS SELF ADMINISTERED DRUGS (ALT 637 FOR MEDICARE OP): Performed by: STUDENT IN AN ORGANIZED HEALTH CARE EDUCATION/TRAINING PROGRAM

## 2025-02-01 PROCEDURE — 2500000004 HC RX 250 GENERAL PHARMACY W/ HCPCS (ALT 636 FOR OP/ED): Performed by: STUDENT IN AN ORGANIZED HEALTH CARE EDUCATION/TRAINING PROGRAM

## 2025-02-01 PROCEDURE — 82947 ASSAY GLUCOSE BLOOD QUANT: CPT

## 2025-02-01 PROCEDURE — 93005 ELECTROCARDIOGRAM TRACING: CPT

## 2025-02-01 PROCEDURE — 1100000001 HC PRIVATE ROOM DAILY

## 2025-02-01 RX ORDER — IPRATROPIUM BROMIDE AND ALBUTEROL SULFATE 2.5; .5 MG/3ML; MG/3ML
3 SOLUTION RESPIRATORY (INHALATION)
Status: DISCONTINUED | OUTPATIENT
Start: 2025-02-01 | End: 2025-02-04 | Stop reason: HOSPADM

## 2025-02-01 RX ADMIN — BENZONATATE 100 MG: 100 CAPSULE ORAL at 08:00

## 2025-02-01 RX ADMIN — BENZONATATE 100 MG: 100 CAPSULE ORAL at 14:50

## 2025-02-01 RX ADMIN — Medication 6 L/MIN: at 20:27

## 2025-02-01 RX ADMIN — ENOXAPARIN SODIUM 40 MG: 40 INJECTION SUBCUTANEOUS at 08:00

## 2025-02-01 RX ADMIN — PANTOPRAZOLE SODIUM 40 MG: 40 TABLET, DELAYED RELEASE ORAL at 06:16

## 2025-02-01 RX ADMIN — OSELTAMIVIR PHOSPHATE 75 MG: 75 CAPSULE ORAL at 20:24

## 2025-02-01 RX ADMIN — INSULIN LISPRO 4 UNITS: 100 INJECTION, SOLUTION INTRAVENOUS; SUBCUTANEOUS at 20:25

## 2025-02-01 RX ADMIN — PREDNISONE 40 MG: 20 TABLET ORAL at 08:00

## 2025-02-01 RX ADMIN — Medication 6 L/MIN: at 06:41

## 2025-02-01 RX ADMIN — OXYCODONE AND ACETAMINOPHEN 0.5 TABLET: 7.5; 325 TABLET ORAL at 05:44

## 2025-02-01 RX ADMIN — IPRATROPIUM BROMIDE AND ALBUTEROL SULFATE 3 ML: .5; 3 SOLUTION RESPIRATORY (INHALATION) at 12:31

## 2025-02-01 RX ADMIN — OSELTAMIVIR PHOSPHATE 75 MG: 75 CAPSULE ORAL at 08:00

## 2025-02-01 RX ADMIN — INSULIN LISPRO 6 UNITS: 100 INJECTION, SOLUTION INTRAVENOUS; SUBCUTANEOUS at 17:00

## 2025-02-01 RX ADMIN — SIMVASTATIN 40 MG: 40 TABLET, FILM COATED ORAL at 20:24

## 2025-02-01 RX ADMIN — INSULIN GLARGINE 10 UNITS: 100 INJECTION, SOLUTION SUBCUTANEOUS at 20:24

## 2025-02-01 RX ADMIN — LISINOPRIL 5 MG: 5 TABLET ORAL at 05:45

## 2025-02-01 RX ADMIN — Medication 6 L/MIN: at 07:31

## 2025-02-01 RX ADMIN — INSULIN LISPRO 4 UNITS: 100 INJECTION, SOLUTION INTRAVENOUS; SUBCUTANEOUS at 12:04

## 2025-02-01 RX ADMIN — Medication 6 L/MIN: at 12:31

## 2025-02-01 RX ADMIN — FLUTICASONE FUROATE AND VILANTEROL TRIFENATATE 1 PUFF: 100; 25 POWDER RESPIRATORY (INHALATION) at 08:03

## 2025-02-01 RX ADMIN — IPRATROPIUM BROMIDE AND ALBUTEROL SULFATE 3 ML: 2.5; .5 SOLUTION RESPIRATORY (INHALATION) at 06:41

## 2025-02-01 RX ADMIN — LIDOCAINE 4% 1 PATCH: 40 PATCH TOPICAL at 08:00

## 2025-02-01 ASSESSMENT — COGNITIVE AND FUNCTIONAL STATUS - GENERAL
TOILETING: A LITTLE
DAILY ACTIVITIY SCORE: 19
DRESSING REGULAR UPPER BODY CLOTHING: A LITTLE
CLIMB 3 TO 5 STEPS WITH RAILING: A LITTLE
MOBILITY SCORE: 21
STANDING UP FROM CHAIR USING ARMS: A LITTLE
PERSONAL GROOMING: A LITTLE
HELP NEEDED FOR BATHING: A LITTLE
WALKING IN HOSPITAL ROOM: A LITTLE
DRESSING REGULAR LOWER BODY CLOTHING: A LITTLE

## 2025-02-01 ASSESSMENT — PAIN - FUNCTIONAL ASSESSMENT
PAIN_FUNCTIONAL_ASSESSMENT: 0-10
PAIN_FUNCTIONAL_ASSESSMENT: 0-10

## 2025-02-01 ASSESSMENT — PAIN DESCRIPTION - LOCATION: LOCATION: NECK

## 2025-02-01 ASSESSMENT — PAIN SCALES - GENERAL
PAINLEVEL_OUTOF10: 2
PAINLEVEL_OUTOF10: 3
PAINLEVEL_OUTOF10: 2
PAINLEVEL_OUTOF10: 7

## 2025-02-01 ASSESSMENT — PAIN DESCRIPTION - ORIENTATION: ORIENTATION: LEFT;MID

## 2025-02-01 NOTE — NURSING NOTE
RRN note; ICU tx follow up: Patient refusing bipap and is intermittently confused getting up and pulling oxygen off. Patients VSS. No other concerns at this time.

## 2025-02-01 NOTE — CARE PLAN
The patient's goals for the shift include safety      The clinical goals for the shift include safety and maintain O2 levels

## 2025-02-01 NOTE — PROGRESS NOTES
Estrella Melton is a 70 y.o. female on day 3 of admission presenting with Fall, initial encounter.      Subjective   Pt can't really follow my command as she is in respiratory distress   She feels unwell, has SOB however on RA, sitting out of bed to chair       Objective     Last Recorded Vitals  /68 (BP Location: Left arm, Patient Position: Sitting)   Pulse 96   Temp 36.9 °C (98.4 °F) (Temporal)   Resp 18   Wt 94.3 kg (208 lb)   SpO2 93%   Intake/Output last 3 Shifts:    Intake/Output Summary (Last 24 hours) at 2/1/2025 1255  Last data filed at 1/31/2025 2200  Gross per 24 hour   Intake 360 ml   Output 700 ml   Net -340 ml       Admission Weight  Weight: 94.3 kg (208 lb) (01/29/25 1122)    Daily Weight  01/31/25 : 94.3 kg (208 lb)    Image Results  ECG 12 lead  Sinus tachycardia  Anteroseptal infarct , age undetermined  Abnormal ECG  When compared with ECG of 16-SEP-2024 11:36,  Anteroseptal infarct is now Present      Physical Exam    General: Well-developed frail obese female, ill appearing, has resp distress, on RA  HEENT: AT, NC, no JVD, no lymphadenopathy, neck supple  Lungs: B/L wheezing noted, prolonged expiration    Cardiac: Normal S1-S2, no murmur, no gallop  Abdomen: Soft, nontender, no distention, positive bowel sound  Extremities: No deformity, no edema, pulses intact, ROM intact  Neurological: Alert awake oriented x3, sensation intact, clear speech          Assessment & Plan  Fall, initial encounter        Acute on chronic hypoxic hypercapnic respiratory failure-currently BiPAP dependent  Influenza A  Acute exacerbation of COPD  Coronary artery disease status post stenting  Diabetes mellitus type 2  Hypertension  Hyperlipidemia  Diverticulosis  Obesity-BMI 38  Current everyday smoker-1 pack/day x 30 years  CODE STATUS-full code         Plan:  Continue oxygen as needed   Should continue BiPAP support at night  Continue prednisone to complete 5 days of steroids, Tamiflu  Scheduled DuoNebs 4 times  daily while awake, Breo  Off Abx   ISS, hypoglycemia protocol, POCT glucose   Ortho recs appreciated, conservative mgmt       DVT Prophylaxis: Lovenox   Disposition: PT/OT, moderate intensity discharge, TCC following for discharge planning for SNF       Jose Lobo MD

## 2025-02-01 NOTE — CARE PLAN
The patient's goals for the shift include Unable    The clinical goals for the shift include Safety throughout this shift

## 2025-02-02 VITALS
DIASTOLIC BLOOD PRESSURE: 59 MMHG | HEIGHT: 61 IN | WEIGHT: 208 LBS | TEMPERATURE: 96.4 F | SYSTOLIC BLOOD PRESSURE: 127 MMHG | RESPIRATION RATE: 16 BRPM | BODY MASS INDEX: 39.27 KG/M2 | OXYGEN SATURATION: 97 % | HEART RATE: 79 BPM

## 2025-02-02 LAB
ANION GAP SERPL CALC-SCNC: <7 MMOL/L (ref 10–20)
BACTERIA BLD CULT: NORMAL
BACTERIA BLD CULT: NORMAL
BUN SERPL-MCNC: 15 MG/DL (ref 6–23)
CALCIUM SERPL-MCNC: 8.8 MG/DL (ref 8.6–10.3)
CHLORIDE SERPL-SCNC: 98 MMOL/L (ref 98–107)
CO2 SERPL-SCNC: 41 MMOL/L (ref 21–32)
CREAT SERPL-MCNC: 0.64 MG/DL (ref 0.5–1.05)
EGFRCR SERPLBLD CKD-EPI 2021: >90 ML/MIN/1.73M*2
ERYTHROCYTE [DISTWIDTH] IN BLOOD BY AUTOMATED COUNT: 14 % (ref 11.5–14.5)
GLUCOSE BLD MANUAL STRIP-MCNC: 132 MG/DL (ref 74–99)
GLUCOSE BLD MANUAL STRIP-MCNC: 146 MG/DL (ref 74–99)
GLUCOSE BLD MANUAL STRIP-MCNC: 248 MG/DL (ref 74–99)
GLUCOSE BLD MANUAL STRIP-MCNC: 282 MG/DL (ref 74–99)
GLUCOSE SERPL-MCNC: 118 MG/DL (ref 74–99)
HCT VFR BLD AUTO: 44.1 % (ref 36–46)
HGB BLD-MCNC: 14.2 G/DL (ref 12–16)
HOLD SPECIMEN: NORMAL
MCH RBC QN AUTO: 32.5 PG (ref 26–34)
MCHC RBC AUTO-ENTMCNC: 32.2 G/DL (ref 32–36)
MCV RBC AUTO: 101 FL (ref 80–100)
NRBC BLD-RTO: 0 /100 WBCS (ref 0–0)
PLATELET # BLD AUTO: 196 X10*3/UL (ref 150–450)
POTASSIUM SERPL-SCNC: 4.4 MMOL/L (ref 3.5–5.3)
RBC # BLD AUTO: 4.37 X10*6/UL (ref 4–5.2)
SODIUM SERPL-SCNC: 141 MMOL/L (ref 136–145)
WBC # BLD AUTO: 8.9 X10*3/UL (ref 4.4–11.3)

## 2025-02-02 PROCEDURE — 85027 COMPLETE CBC AUTOMATED: CPT | Performed by: STUDENT IN AN ORGANIZED HEALTH CARE EDUCATION/TRAINING PROGRAM

## 2025-02-02 PROCEDURE — 2500000002 HC RX 250 W HCPCS SELF ADMINISTERED DRUGS (ALT 637 FOR MEDICARE OP, ALT 636 FOR OP/ED): Performed by: STUDENT IN AN ORGANIZED HEALTH CARE EDUCATION/TRAINING PROGRAM

## 2025-02-02 PROCEDURE — 2500000004 HC RX 250 GENERAL PHARMACY W/ HCPCS (ALT 636 FOR OP/ED): Performed by: STUDENT IN AN ORGANIZED HEALTH CARE EDUCATION/TRAINING PROGRAM

## 2025-02-02 PROCEDURE — 82947 ASSAY GLUCOSE BLOOD QUANT: CPT

## 2025-02-02 PROCEDURE — 2500000005 HC RX 250 GENERAL PHARMACY W/O HCPCS: Performed by: STUDENT IN AN ORGANIZED HEALTH CARE EDUCATION/TRAINING PROGRAM

## 2025-02-02 PROCEDURE — 82374 ASSAY BLOOD CARBON DIOXIDE: CPT | Performed by: STUDENT IN AN ORGANIZED HEALTH CARE EDUCATION/TRAINING PROGRAM

## 2025-02-02 PROCEDURE — 1100000001 HC PRIVATE ROOM DAILY

## 2025-02-02 PROCEDURE — 94660 CPAP INITIATION&MGMT: CPT

## 2025-02-02 PROCEDURE — 94640 AIRWAY INHALATION TREATMENT: CPT

## 2025-02-02 PROCEDURE — 2500000001 HC RX 250 WO HCPCS SELF ADMINISTERED DRUGS (ALT 637 FOR MEDICARE OP): Performed by: STUDENT IN AN ORGANIZED HEALTH CARE EDUCATION/TRAINING PROGRAM

## 2025-02-02 PROCEDURE — 36415 COLL VENOUS BLD VENIPUNCTURE: CPT | Performed by: STUDENT IN AN ORGANIZED HEALTH CARE EDUCATION/TRAINING PROGRAM

## 2025-02-02 PROCEDURE — 99232 SBSQ HOSP IP/OBS MODERATE 35: CPT | Performed by: STUDENT IN AN ORGANIZED HEALTH CARE EDUCATION/TRAINING PROGRAM

## 2025-02-02 RX ADMIN — OSELTAMIVIR PHOSPHATE 75 MG: 75 CAPSULE ORAL at 09:44

## 2025-02-02 RX ADMIN — ENOXAPARIN SODIUM 40 MG: 40 INJECTION SUBCUTANEOUS at 09:44

## 2025-02-02 RX ADMIN — FLUTICASONE FUROATE AND VILANTEROL TRIFENATATE 1 PUFF: 100; 25 POWDER RESPIRATORY (INHALATION) at 09:45

## 2025-02-02 RX ADMIN — LIDOCAINE 4% 1 PATCH: 40 PATCH TOPICAL at 09:44

## 2025-02-02 RX ADMIN — IPRATROPIUM BROMIDE AND ALBUTEROL SULFATE 3 ML: .5; 3 SOLUTION RESPIRATORY (INHALATION) at 06:47

## 2025-02-02 RX ADMIN — POLYETHYLENE GLYCOL 3350 17 G: 17 POWDER, FOR SOLUTION ORAL at 09:44

## 2025-02-02 RX ADMIN — OXYCODONE AND ACETAMINOPHEN 0.5 TABLET: 7.5; 325 TABLET ORAL at 01:22

## 2025-02-02 RX ADMIN — IPRATROPIUM BROMIDE AND ALBUTEROL SULFATE 3 ML: .5; 3 SOLUTION RESPIRATORY (INHALATION) at 19:58

## 2025-02-02 RX ADMIN — PANTOPRAZOLE SODIUM 40 MG: 40 TABLET, DELAYED RELEASE ORAL at 05:41

## 2025-02-02 RX ADMIN — LISINOPRIL 5 MG: 5 TABLET ORAL at 09:49

## 2025-02-02 RX ADMIN — SIMVASTATIN 40 MG: 40 TABLET, FILM COATED ORAL at 20:18

## 2025-02-02 RX ADMIN — INSULIN GLARGINE 10 UNITS: 100 INJECTION, SOLUTION SUBCUTANEOUS at 20:18

## 2025-02-02 RX ADMIN — PREDNISONE 40 MG: 20 TABLET ORAL at 09:44

## 2025-02-02 RX ADMIN — Medication 6 L/MIN: at 06:47

## 2025-02-02 RX ADMIN — INSULIN LISPRO 6 UNITS: 100 INJECTION, SOLUTION INTRAVENOUS; SUBCUTANEOUS at 16:24

## 2025-02-02 RX ADMIN — Medication 6 L/MIN: at 12:09

## 2025-02-02 RX ADMIN — Medication 4 L/MIN: at 09:43

## 2025-02-02 RX ADMIN — IPRATROPIUM BROMIDE AND ALBUTEROL SULFATE 3 ML: .5; 3 SOLUTION RESPIRATORY (INHALATION) at 12:09

## 2025-02-02 RX ADMIN — INSULIN LISPRO 4 UNITS: 100 INJECTION, SOLUTION INTRAVENOUS; SUBCUTANEOUS at 20:18

## 2025-02-02 RX ADMIN — OSELTAMIVIR PHOSPHATE 75 MG: 75 CAPSULE ORAL at 20:18

## 2025-02-02 ASSESSMENT — PAIN SCALES - GENERAL
PAINLEVEL_OUTOF10: 7
PAINLEVEL_OUTOF10: 0 - NO PAIN
PAINLEVEL_OUTOF10: 0 - NO PAIN

## 2025-02-02 ASSESSMENT — PAIN - FUNCTIONAL ASSESSMENT
PAIN_FUNCTIONAL_ASSESSMENT: 0-10
PAIN_FUNCTIONAL_ASSESSMENT: PAINAD (PAIN ASSESSMENT IN ADVANCED DEMENTIA SCALE)
PAIN_FUNCTIONAL_ASSESSMENT: 0-10

## 2025-02-02 ASSESSMENT — PAIN SCALES - PAIN ASSESSMENT IN ADVANCED DEMENTIA (PAINAD)
CONSOLABILITY: NO NEED TO CONSOLE
BREATHING: NORMAL
FACIALEXPRESSION: SMILING OR INEXPRESSIVE
BODYLANGUAGE: RELAXED
TOTALSCORE: 0

## 2025-02-02 ASSESSMENT — COGNITIVE AND FUNCTIONAL STATUS - GENERAL
MOBILITY SCORE: 20
DRESSING REGULAR UPPER BODY CLOTHING: A LITTLE
STANDING UP FROM CHAIR USING ARMS: A LITTLE
DRESSING REGULAR LOWER BODY CLOTHING: A LITTLE
HELP NEEDED FOR BATHING: A LITTLE
DAILY ACTIVITIY SCORE: 21
MOVING TO AND FROM BED TO CHAIR: A LITTLE
CLIMB 3 TO 5 STEPS WITH RAILING: A LITTLE
WALKING IN HOSPITAL ROOM: A LITTLE

## 2025-02-02 NOTE — CARE PLAN
The patient's goals for the shift include Unable    The clinical goals for the shift include safety and maintain O2 levels

## 2025-02-02 NOTE — CARE PLAN
The patient's goals for the shift include Unable    The clinical goals for the shift include safety and maintain O2 levels      Problem: Pain - Adult  Goal: Verbalizes/displays adequate comfort level or baseline comfort level  Outcome: Progressing     Problem: Safety - Adult  Goal: Free from fall injury  Outcome: Progressing     Problem: Discharge Planning  Goal: Discharge to home or other facility with appropriate resources  Outcome: Progressing     Problem: Chronic Conditions and Co-morbidities  Goal: Patient's chronic conditions and co-morbidity symptoms are monitored and maintained or improved  Outcome: Progressing     Problem: Nutrition  Goal: Nutrient intake appropriate for maintaining nutritional needs  Outcome: Progressing     Problem: Skin  Goal: Decreased wound size/increased tissue granulation at next dressing change  Outcome: Progressing  Goal: Participates in plan/prevention/treatment measures  Outcome: Progressing  Goal: Prevent/manage excess moisture  Outcome: Progressing  Goal: Prevent/minimize sheer/friction injuries  Outcome: Progressing  Goal: Promote/optimize nutrition  Outcome: Progressing  Goal: Promote skin healing  Outcome: Progressing     Problem: Fall/Injury  Goal: Not fall by end of shift  Outcome: Progressing  Goal: Be free from injury by end of the shift  Outcome: Progressing  Goal: Verbalize understanding of personal risk factors for fall in the hospital  Outcome: Progressing  Goal: Verbalize understanding of risk factor reduction measures to prevent injury from fall in the home  Outcome: Progressing  Goal: Use assistive devices by end of the shift  Outcome: Progressing  Goal: Pace activities to prevent fatigue by end of the shift  Outcome: Progressing     Problem: Diabetes  Goal: Achieve decreasing blood glucose levels by end of shift  Outcome: Progressing  Goal: Increase stability of blood glucose readings by end of shift  Outcome: Progressing  Goal: Decrease in ketones present in  urine by end of shift  Outcome: Progressing  Goal: Maintain electrolyte levels within acceptable range throughout shift  Outcome: Progressing  Goal: Maintain glucose levels >70mg/dl to <250mg/dl throughout shift  Outcome: Progressing  Goal: No changes in neurological exam by end of shift  Outcome: Progressing  Goal: Learn about and adhere to nutrition recommendations by end of shift  Outcome: Progressing  Goal: Vital signs within normal range for age by end of shift  Outcome: Progressing  Goal: Increase self care and/or family involovement by end of shift  Outcome: Progressing  Goal: Receive DSME education by end of shift  Outcome: Progressing     Problem: Pain  Goal: Takes deep breaths with improved pain control throughout the shift  Outcome: Progressing  Goal: Turns in bed with improved pain control throughout the shift  Outcome: Progressing  Goal: Walks with improved pain control throughout the shift  Outcome: Progressing  Goal: Performs ADL's with improved pain control throughout shift  Outcome: Progressing  Goal: Free from opioid side effects throughout the shift  Outcome: Progressing  Goal: Free from acute confusion related to pain meds throughout the shift  Outcome: Progressing

## 2025-02-02 NOTE — NURSING NOTE
24 hr post ICU transfer out chart review:  Pt observed on rounds around 3am. Had been seen by RRT for IV placement prior night.   Aroused from sound sleep easily. She has refused to wear bipap and was found with NC off as well. NC  reapplied, declined to wear the bipap mask  again at that time.  She was breathing shallow rapid prior to awakening without note of lengthy periods of apnea.   Pt cleaned up by floor staff for incontinence of stool and urine.  Afebrile, on no antibiotics, 5 day course of Tamil flu for influenza A, steroid taper for bronchiolitis, tessalon for cough control.

## 2025-02-02 NOTE — PROGRESS NOTES
Estrella Melton is a 70 y.o. female on day 4 of admission presenting with Fall, initial encounter.      Subjective   Patient looks better, more oriented and alert, feeling well however she was hypoxic down to 80s and was placed back on BiPAP for 2 hours and eventually was switched to Ventimask with 15 L oxygen.    Objective     Last Recorded Vitals  /56 (BP Location: Right arm, Patient Position: Lying)   Pulse 98   Temp 36.5 °C (97.7 °F) (Temporal)   Resp 21   Wt 94.3 kg (208 lb)   SpO2 (!) 89%   Intake/Output last 3 Shifts:  No intake or output data in the 24 hours ending 02/02/25 1327    Admission Weight  Weight: 94.3 kg (208 lb) (01/29/25 1122)    Daily Weight  01/31/25 : 94.3 kg (208 lb)    Image Results  ECG 12 lead  Sinus tachycardia  Anteroseptal infarct , age undetermined  Abnormal ECG  When compared with ECG of 16-SEP-2024 11:36,  Anteroseptal infarct is now Present      Physical Exam    General: Well-developed frail obese female, ill appearing, has resp distress, on RA  HEENT: AT, NC, no JVD, no lymphadenopathy, neck supple  Lungs: B/L wheezing noted, prolonged expiration    Cardiac: Normal S1-S2, no murmur, no gallop  Abdomen: Soft, nontender, no distention, positive bowel sound  Extremities: No deformity, no edema, pulses intact, ROM intact  Neurological: Alert awake oriented x3, sensation intact, clear speech        Assessment & Plan  Fall, initial encounter        Acute on chronic hypoxic hypercapnic respiratory failure-currently BiPAP dependent  Influenza A  Acute exacerbation of COPD  Coronary artery disease status post stenting  Diabetes mellitus type 2  Hypertension  Hyperlipidemia  Diverticulosis  Obesity-BMI 38  Current everyday smoker-1 pack/day x 30 years  CODE STATUS-full code           Plan:  Continue oxygen therapy, currently on Ventimask 15 L  Should continue BiPAP support at night and as needed  Continue prednisone to complete 5 days of steroids, Tamiflu  Scheduled DuoNebs 4 times  daily while awake, Breo  Off Abx   ISS, hypoglycemia protocol, POCT glucose   Ortho recs appreciated, conservative mgmt         DVT Prophylaxis: Lovenox   Disposition: PT/OT, moderate intensity discharge, TCC following for discharge planning for SNF   Patient wants to go home.  No need for am labs         Jose Lobo MD

## 2025-02-03 LAB
GLUCOSE BLD MANUAL STRIP-MCNC: 119 MG/DL (ref 74–99)
GLUCOSE BLD MANUAL STRIP-MCNC: 186 MG/DL (ref 74–99)
GLUCOSE BLD MANUAL STRIP-MCNC: 233 MG/DL (ref 74–99)
GLUCOSE BLD MANUAL STRIP-MCNC: 295 MG/DL (ref 74–99)

## 2025-02-03 PROCEDURE — 82947 ASSAY GLUCOSE BLOOD QUANT: CPT

## 2025-02-03 PROCEDURE — 94640 AIRWAY INHALATION TREATMENT: CPT

## 2025-02-03 PROCEDURE — 97535 SELF CARE MNGMENT TRAINING: CPT | Mod: GO

## 2025-02-03 PROCEDURE — 97530 THERAPEUTIC ACTIVITIES: CPT | Mod: GP,CQ

## 2025-02-03 PROCEDURE — 2500000005 HC RX 250 GENERAL PHARMACY W/O HCPCS: Performed by: STUDENT IN AN ORGANIZED HEALTH CARE EDUCATION/TRAINING PROGRAM

## 2025-02-03 PROCEDURE — 2500000001 HC RX 250 WO HCPCS SELF ADMINISTERED DRUGS (ALT 637 FOR MEDICARE OP): Performed by: STUDENT IN AN ORGANIZED HEALTH CARE EDUCATION/TRAINING PROGRAM

## 2025-02-03 PROCEDURE — 99232 SBSQ HOSP IP/OBS MODERATE 35: CPT | Performed by: STUDENT IN AN ORGANIZED HEALTH CARE EDUCATION/TRAINING PROGRAM

## 2025-02-03 PROCEDURE — 94660 CPAP INITIATION&MGMT: CPT

## 2025-02-03 PROCEDURE — 2500000004 HC RX 250 GENERAL PHARMACY W/ HCPCS (ALT 636 FOR OP/ED): Performed by: STUDENT IN AN ORGANIZED HEALTH CARE EDUCATION/TRAINING PROGRAM

## 2025-02-03 PROCEDURE — 1100000001 HC PRIVATE ROOM DAILY

## 2025-02-03 PROCEDURE — 2500000002 HC RX 250 W HCPCS SELF ADMINISTERED DRUGS (ALT 637 FOR MEDICARE OP, ALT 636 FOR OP/ED): Performed by: STUDENT IN AN ORGANIZED HEALTH CARE EDUCATION/TRAINING PROGRAM

## 2025-02-03 RX ADMIN — PREDNISONE 40 MG: 20 TABLET ORAL at 08:11

## 2025-02-03 RX ADMIN — IPRATROPIUM BROMIDE AND ALBUTEROL SULFATE 3 ML: .5; 3 SOLUTION RESPIRATORY (INHALATION) at 12:34

## 2025-02-03 RX ADMIN — IPRATROPIUM BROMIDE AND ALBUTEROL SULFATE 3 ML: .5; 3 SOLUTION RESPIRATORY (INHALATION) at 07:01

## 2025-02-03 RX ADMIN — PANTOPRAZOLE SODIUM 40 MG: 40 TABLET, DELAYED RELEASE ORAL at 06:10

## 2025-02-03 RX ADMIN — ENOXAPARIN SODIUM 40 MG: 40 INJECTION SUBCUTANEOUS at 08:11

## 2025-02-03 RX ADMIN — SIMVASTATIN 40 MG: 40 TABLET, FILM COATED ORAL at 21:15

## 2025-02-03 RX ADMIN — INSULIN LISPRO 2 UNITS: 100 INJECTION, SOLUTION INTRAVENOUS; SUBCUTANEOUS at 11:19

## 2025-02-03 RX ADMIN — Medication 5 L/MIN: at 20:22

## 2025-02-03 RX ADMIN — IPRATROPIUM BROMIDE AND ALBUTEROL SULFATE 3 ML: .5; 3 SOLUTION RESPIRATORY (INHALATION) at 20:17

## 2025-02-03 RX ADMIN — FLUTICASONE FUROATE AND VILANTEROL TRIFENATATE 1 PUFF: 100; 25 POWDER RESPIRATORY (INHALATION) at 08:15

## 2025-02-03 RX ADMIN — INSULIN LISPRO 4 UNITS: 100 INJECTION, SOLUTION INTRAVENOUS; SUBCUTANEOUS at 21:15

## 2025-02-03 RX ADMIN — INSULIN LISPRO 6 UNITS: 100 INJECTION, SOLUTION INTRAVENOUS; SUBCUTANEOUS at 16:42

## 2025-02-03 RX ADMIN — LIDOCAINE 4% 1 PATCH: 40 PATCH TOPICAL at 08:11

## 2025-02-03 RX ADMIN — INSULIN GLARGINE 10 UNITS: 100 INJECTION, SOLUTION SUBCUTANEOUS at 21:15

## 2025-02-03 RX ADMIN — Medication 60 PERCENT: at 08:00

## 2025-02-03 RX ADMIN — Medication 6 L/MIN: at 10:00

## 2025-02-03 RX ADMIN — LISINOPRIL 5 MG: 5 TABLET ORAL at 08:11

## 2025-02-03 ASSESSMENT — PAIN - FUNCTIONAL ASSESSMENT
PAIN_FUNCTIONAL_ASSESSMENT: 0-10
PAIN_FUNCTIONAL_ASSESSMENT: 0-10

## 2025-02-03 ASSESSMENT — COGNITIVE AND FUNCTIONAL STATUS - GENERAL
DRESSING REGULAR UPPER BODY CLOTHING: A LITTLE
HELP NEEDED FOR BATHING: A LITTLE
MOBILITY SCORE: 15
DRESSING REGULAR UPPER BODY CLOTHING: A LITTLE
STANDING UP FROM CHAIR USING ARMS: A LITTLE
MOVING TO AND FROM BED TO CHAIR: A LITTLE
CLIMB 3 TO 5 STEPS WITH RAILING: A LITTLE
HELP NEEDED FOR BATHING: A LOT
CLIMB 3 TO 5 STEPS WITH RAILING: TOTAL
DAILY ACTIVITIY SCORE: 21
STANDING UP FROM CHAIR USING ARMS: A LITTLE
MOVING TO AND FROM BED TO CHAIR: A LITTLE
MOVING FROM LYING ON BACK TO SITTING ON SIDE OF FLAT BED WITH BEDRAILS: A LITTLE
DAILY ACTIVITIY SCORE: 17
DAILY ACTIVITIY SCORE: 20
CLIMB 3 TO 5 STEPS WITH RAILING: A LITTLE
HELP NEEDED FOR BATHING: A LITTLE
MOBILITY SCORE: 20
DRESSING REGULAR UPPER BODY CLOTHING: A LITTLE
TURNING FROM BACK TO SIDE WHILE IN FLAT BAD: A LOT
WALKING IN HOSPITAL ROOM: A LITTLE
MOVING TO AND FROM BED TO CHAIR: A LITTLE
DRESSING REGULAR LOWER BODY CLOTHING: A LITTLE
STANDING UP FROM CHAIR USING ARMS: A LITTLE
TOILETING: A LITTLE
WALKING IN HOSPITAL ROOM: A LITTLE
PERSONAL GROOMING: A LITTLE
PERSONAL GROOMING: A LITTLE
DRESSING REGULAR LOWER BODY CLOTHING: A LOT
MOBILITY SCORE: 20
WALKING IN HOSPITAL ROOM: A LITTLE
TOILETING: A LITTLE

## 2025-02-03 ASSESSMENT — PAIN SCALES - GENERAL
PAINLEVEL_OUTOF10: 0 - NO PAIN

## 2025-02-03 ASSESSMENT — ACTIVITIES OF DAILY LIVING (ADL): HOME_MANAGEMENT_TIME_ENTRY: 18

## 2025-02-03 NOTE — PROGRESS NOTES
"Physical Therapy    Physical Therapy Treatment    Patient Name: Estrella Melton  MRN: [id number]  Today's Date: 2/3/2025  Time Calculation  Start Time: 0939  Stop Time: 1012  Time Calculation (min): 33 min     1014/1014-A    Assessment/Plan   End of Session Communication: Bedside nurse    Assessment Comment: Pt. Is weak and deconditioned. She continues to require assist for transfers and mobility. Patient would benefit from continued PT to increase mobility and indep.    End of Session Patient Position:  (Patient reclined in chair after treatment. Call light/tray in reach. Warm blankets provided. Chair alarm on.)    PT Plan  Inpatient/Swing Bed or Outpatient: Inpatient  Treatment/Interventions: Bed mobility, Transfer training, Gait training, Stair training, Balance training, Strengthening, Endurance training, Therapeutic exercise, Therapeutic activity, Home exercise program  PT Plan: Ongoing PT  PT Frequency: 3 times per week  PT Discharge Recommendations: Moderate intensity level of continued care (Pt. lives alone.)  Equipment Recommended upon Discharge: Wheeled walker PT Recommended Transfer Status: Assist x1, Assistive device (ww)    General Visit Information:   PT  Visit  PT Received On: 02/03/25    General  Prior to Session Communication:  (Cleared by RN for PT)  Patient Position Received:  (Patient presents in bed, agreeable to PT.  Nasal cannula not in nares when therapy arrived. Sp02 82%  Repositioned 02 and Sp02 increased to 89-90%)    General Comment:  (Dx: Fall with back pain; (+) Flu)    General Observations:   General Observation:  (purwick (removed per patient request); 6L 02 via NC; alarm; cont Sp02 monitor)    Subjective  \"I like to go to the [employer]\"     Precautions:  Precautions  Medical Precautions: Fall precautions  Precautions Comment:  (Droplet precautions)      Pain:  Pain Assessment  Pain Assessment: 0-10  0-10 (Numeric) Pain Score: 0 - No pain    Cognition:  Cognition  Overall Cognitive Status: " Impaired (pleasant and cooperative; decreased insight into limitations; mild confusion.  Patient asking if it was 10:00 am or pm (sitting next to window with daylight showing))  Insight: Moderate      Balance:   Dynamic Standing Balance  Dynamic Standing-Balance:  (Fair- dynamic stand with ww)    Activity Tolerance:  Activity Tolerance  Endurance: Decreased tolerance for upright activites       Bed Mobility  Bed Mobility:  (supine->sit: CGAx1  HOB raised 60 degrees.  Patient transferred towards her left side to mimic home set up.  Impulsive, cues given to sit EOB for several minutes prior to standing up.)    Ambulation/Gait Training  Ambulation/Gait Training Performed:  (Patient amb bed->BSC->chair and then an additional 30' with ww and min x1.  Slow aidan, decreased step length bilat. Assist needed to manage 02 tubing and for safety.  Sp02 90-92% after amb on 6L)    Transfers  Transfer:  (sit->stand: CGAx1  Three stands performed (EOB; BSC; chair).  Instructions needed for hand placement each time (attempts to grab onto ww)   stand->sit: min x1 for safety. Tends to hang onto ww instead of reaching back as instructed.)             Outcome Measures:   [facility] Basic Mobility  Turning from your back to your side while in a flat bed without using bedrails: A little  Moving from lying on your back to sitting on the side of a flat bed without using bedrails: A lot (Patient CGAx1 with HOB raised 60 degrees.  Anticipate she would require increased assist with bed flat)  Moving to and from bed to chair (including a wheelchair): A little  Standing up from a chair using your arms (e.g. wheelchair or bedside chair): A little  To walk in hospital room: A little  Climbing 3-5 steps with railing: Total  Basic Mobility - Total Score: 15          Education Documentation  Mobility Training, taught by Chikis Cervantes PTA at 2/3/2025 10:17 AM.  Learner: Patient  Readiness: Acceptance  Method: Explanation, Demonstration  Response: Needs  Reinforcement      Encounter Problems       Encounter Problems (Active)       Impaired mobility        Perform all bed mobility with mod. indep.  (Progressing)       Start:  01/31/25    Expected End:  02/14/25            Perform all transfers with ww and mod. indep., managing O2 line indep.   (Progressing)       Start:  01/31/25    Expected End:  02/14/25            Patient will ambulate 50 ft with ww and mod. indep., managing O2 line indep.  (Progressing)       Start:  01/31/25    Expected End:  02/14/25            Patient will ascend/descend 4 steps with HR and SPC, if needed, with CGAx1 (Not Progressing)       Start:  01/31/25    Expected End:  02/14/25            Patient will perform BLE HEP with indep. x 10-20 reps x 1-2 sets  (Not Progressing)       Start:  01/31/25    Expected End:  02/14/25

## 2025-02-03 NOTE — PROGRESS NOTES
Estrella Melton is a 70 y.o. female on day 5 of admission presenting with Fall, initial encounter.      Subjective   Patient states that she feels great however she is on 6 L oxygen and she needs BiPAP overnight.  She is not compliant at night for BiPAP.  She had a sitter at bedside for BiPAP overnight.    Objective     Last Recorded Vitals  /60   Pulse 84   Temp 37.4 °C (99.3 °F)   Resp 20   Wt 94.3 kg (208 lb)   SpO2 95%   Intake/Output last 3 Shifts:    Intake/Output Summary (Last 24 hours) at 2/3/2025 1337  Last data filed at 2/2/2025 2018  Gross per 24 hour   Intake 120 ml   Output --   Net 120 ml       Admission Weight  Weight: 94.3 kg (208 lb) (01/29/25 1122)    Daily Weight  01/31/25 : 94.3 kg (208 lb)    Image Results  ECG 12 lead  Sinus tachycardia  Anteroseptal infarct , age undetermined  Abnormal ECG  When compared with ECG of 16-SEP-2024 11:36,  Anteroseptal infarct is now Present      Physical Exam    General: Well-developed obese female, no acute distress, on oxygen 6 L via NC  HEENT: AT, NC, no JVD, no lymphadenopathy, neck supple  Lungs: B/L wheezing improving, prolonged expiration    Cardiac: Normal S1-S2, no murmur, no gallop  Abdomen: Soft, nontender, no distention, positive bowel sound  Extremities: No deformity, no edema, pulses intact, ROM intact  Neurological: Alert awake oriented x3, sensation intact, clear speech        Assessment & Plan  Fall, initial encounter        Acute on chronic hypoxic hypercapnic respiratory failure-currently BiPAP dependent  Influenza A  Acute exacerbation of COPD  Coronary artery disease status post stenting  Diabetes mellitus type 2  Hypertension  Hyperlipidemia  Diverticulosis  Obesity-BMI 38  Current everyday smoker-1 pack/day x 30 years  T3 compression fracture           Plan:  Continue oxygen therapy, currently oxygen therapy via NC  continue BiPAP support at night and as needed  Continue prednisone to complete 5 days of steroids, Tamiflu  Scheduled  DuoNebs 4 times daily while awake, Breo  Off Abx   ISS, hypoglycemia protocol, POCT glucose   Ortho recs appreciated, conservative mgmt         DVT Prophylaxis: Lovenox   Disposition: PT/OT, moderate intensity discharge, TCC following for discharge to SNF           Jose Lobo MD

## 2025-02-03 NOTE — NURSING NOTE
48 hr post ICU transfer out chart review:  Flu A, tamiflu, prednisone, NC and BIPAP for respiratory support. Requires much encouragement to wear bipap. Should be placed with any signs of difficulty of arousal.   Full code.  Labs reflective of chronic hypercarbia, bicarb 41 2/2.

## 2025-02-03 NOTE — PROGRESS NOTES
"Occupational Therapy    Occupational Therapy Treatment    Name: Estrella Melton  MRN: [id number]  Department: [facility]  Room: 1014/1014-A  Date: 02/03/25  Time Calculation  Start Time: 1450  Stop Time: 1508  Time Calculation (min): 18 min    Assessment:  End of Session Communication: Bedside nurse, Care Coordinator  End of Session Patient Position: Up in chair, Alarm on (O2 on, all needs in reach, LEs elevated in recliner.)  Plan:  Treatment Interventions: ADL retraining, Functional transfer training, Endurance training, UE strengthening/ROM, Patient/family training, Neuromuscular reeducation, Compensatory technique education, Equipment evaluation/education  OT Frequency: 3 times per week  OT Discharge Recommendations: Moderate intensity level of continued care, High intensity level of continued care  Equipment Recommended upon Discharge: Wheeled walker  OT - OK to Discharge: Yes (when medically appropriate)    Subjective   Previous Visit Info:  OT Last Visit  OT Received On: 02/03/25  General:  General  Family/Caregiver Present: No  Prior to Session Communication: Bedside nurse  General Comment: Agreeable to therapy.  \"I need to get to the bathroom.\"  Precautions:  Medical Precautions: Fall precautions, Oxygen therapy device and L/min (6L O2/nc)  Precautions Comment: Droplet Precautions for Flu A      Vital Signs Comment: 6L O2 continuously. SpO2 91-93% while seated at rest and 90% with mobility.    Pain Assessment:  Pain Assessment  Pain Assessment: 0-10  0-10 (Numeric) Pain Score: 0 - No pain     Objective   Cognition:  Overall Cognitive Status: Within Functional Limits  Orientation Level: Oriented X4  Processing Speed: Delayed  Activities of Daily Living:      Grooming  Grooming Level of Assistance: Contact guard  Grooming Where Assessed: Standing sinkside (with FWW and gait belt)  Grooming Comments: Pt. stood at sink for hand hygiene. CGA for dynamic standing balance.  Cues for positioning walker and body safely at " sink.    Toileting  Toileting Level of Assistance: Contact guard  Where Assessed: Toilet (sit/stand at toilet with safety bar and FWW for steadying during hygiene.)  Toileting Comments: CGA for dynamic standing components. Close sup for dynamic sitting on toilet.     Bed Mobility/Transfers:      Transfers  Transfer: Yes (CGA sit/stand at recliner and toilet.  FWW and gait belt.  Cues for hand placements.)    Functional Mobility:  Functional Mobility  Functional Mobility Performed: Yes (Pt. ambulated ~ 30' recliner to toilet and 30' toilet to sink to recliner with FWW and gait belt.  CGA and assist with O2 tubing and tank management.)  Sitting Balance:  Static Sitting Balance  Static Sitting-Comment/Number of Minutes: good  Dynamic Sitting Balance  Dynamic Sitting-Comments: good (-)  Standing Balance:  Static Standing Balance  Static Standing-Comment/Number of Minutes: fair (+)  Dynamic Standing Balance  Dynamic Standing-Comments: fair/fair (-)    Outcome Measures:  [facility] Daily Activity  Putting on and taking off regular lower body clothing: A lot  Bathing (including washing, rinsing, drying): A lot  Putting on and taking off regular upper body clothing: A little  Toileting, which includes using toilet, bedpan or urinal: A little  Taking care of personal grooming such as brushing teeth: A little  Eating Meals: None  Daily Activity - Total Score: 17    Education Documentation  Body Mechanics, taught by Lisha Jorgensen OT at 2/3/2025  3:40 PM.  Learner: Patient  Readiness: Acceptance  Method: Explanation  Response: Verbalizes Understanding, Needs Reinforcement    Precautions, taught by Lisha Jorgensen OT at 2/3/2025  3:40 PM.  Learner: Patient  Readiness: Acceptance  Method: Explanation  Response: Verbalizes Understanding, Needs Reinforcement    ADL Training, taught by Lisha Jorgensen OT at 2/3/2025  3:40 PM.  Learner: Patient  Readiness: Acceptance  Method: Explanation  Response: Verbalizes  Understanding, Needs Reinforcement    Education Comments  No comments found.      Goals:  Encounter Problems       Encounter Problems (Active)       OT Goals       Mod I sit/stand, bed/chair/commode with FWW.  (Progressing)       Start:  01/31/25    Expected End:  02/14/25            Mod I ADL functional mobility with FWW.  (Progressing)       Start:  01/31/25    Expected End:  02/14/25            Good dynamic standing balance for ADL.  (Progressing)       Start:  01/31/25    Expected End:  02/14/25            Tolerate 8 mins light functional mobility with stable vitals.  (Progressing)       Start:  01/31/25    Expected End:  02/14/25            Mod I toileting.  (Progressing)       Start:  01/31/25    Expected End:  02/14/25

## 2025-02-03 NOTE — CARE PLAN
The patient's goals for the shift include Unable    The clinical goals for the shift include Maintain  SPO2>92%      Problem: Discharge Planning  Goal: Discharge to home or other facility with appropriate resources  Outcome: Progressing     Problem: Chronic Conditions and Co-morbidities  Goal: Patient's chronic conditions and co-morbidity symptoms are monitored and maintained or improved  Outcome: Progressing     Problem: Nutrition  Goal: Nutrient intake appropriate for maintaining nutritional needs  Outcome: Progressing     Problem: Skin  Goal: Decreased wound size/increased tissue granulation at next dressing change  Outcome: Progressing  Goal: Participates in plan/prevention/treatment measures  Outcome: Progressing  Flowsheets (Taken 2/3/2025 0959)  Participates in plan/prevention/treatment measures:   Discuss with provider PT/OT consult   Elevate heels   Increase activity/out of bed for meals  Goal: Prevent/manage excess moisture  Outcome: Progressing  Flowsheets (Taken 2/3/2025 0959)  Prevent/manage excess moisture: Moisturize dry skin  Goal: Prevent/minimize sheer/friction injuries  Outcome: Progressing  Flowsheets (Taken 2/3/2025 0959)  Prevent/minimize sheer/friction injuries:   Increase activity/out of bed for meals   HOB 30 degrees or less  Goal: Promote/optimize nutrition  Outcome: Progressing  Goal: Promote skin healing  Outcome: Progressing     Problem: Fall/Injury  Goal: Not fall by end of shift  Outcome: Progressing  Goal: Be free from injury by end of the shift  Outcome: Progressing  Goal: Verbalize understanding of personal risk factors for fall in the hospital  Outcome: Progressing  Goal: Verbalize understanding of risk factor reduction measures to prevent injury from fall in the home  Outcome: Progressing  Goal: Use assistive devices by end of the shift  Outcome: Progressing  Goal: Pace activities to prevent fatigue by end of the shift  Outcome: Progressing     Problem: Diabetes  Goal: Achieve  decreasing blood glucose levels by end of shift  Outcome: Progressing  Goal: Increase stability of blood glucose readings by end of shift  Outcome: Progressing  Goal: Maintain electrolyte levels within acceptable range throughout shift  Outcome: Progressing  Goal: Maintain glucose levels >70mg/dl to <250mg/dl throughout shift  Outcome: Progressing  Goal: No changes in neurological exam by end of shift  Outcome: Progressing  Goal: Learn about and adhere to nutrition recommendations by end of shift  Outcome: Progressing  Goal: Vital signs within normal range for age by end of shift  Outcome: Progressing  Goal: Increase self care and/or family involovement by end of shift  Outcome: Progressing  Goal: Receive DSME education by end of shift  Outcome: Progressing     Problem: Pain  Goal: Takes deep breaths with improved pain control throughout the shift  Outcome: Progressing  Goal: Turns in bed with improved pain control throughout the shift  Outcome: Progressing  Goal: Walks with improved pain control throughout the shift  Outcome: Progressing  Goal: Performs ADL's with improved pain control throughout shift  Outcome: Progressing  Goal: Participates in PT with improved pain control throughout the shift  Outcome: Progressing  Goal: Free from opioid side effects throughout the shift  Outcome: Progressing  Goal: Free from acute confusion related to pain meds throughout the shift  Outcome: Progressing     Problem: Respiratory  Goal: Clear secretions with interventions this shift  Outcome: Progressing  Goal: Minimize anxiety/maximize coping throughout shift  Outcome: Progressing  Goal: Minimal/no exertional discomfort or dyspnea this shift  Outcome: Progressing  Goal: No signs of respiratory distress (eg. Use of accessory muscles. Peds grunting)  Outcome: Progressing  Goal: Patent airway maintained this shift  Outcome: Progressing  Goal: Tolerate mechanical ventilation evidenced by VS/agitation level this shift  Outcome:  Progressing  Goal: Verbalize decreased shortness of breath this shift  Outcome: Progressing  Goal: Wean oxygen to maintain O2 saturation per order/standard this shift  Outcome: Progressing  Goal: Increase self care and/or family involvement in next 24 hours  Outcome: Progressing

## 2025-02-03 NOTE — PROGRESS NOTES
Pt/ot now in chart, updated referral sent to [facility], [facility] ins. Team to obtain precert/anthem dual adv.

## 2025-02-04 VITALS
BODY MASS INDEX: 39.27 KG/M2 | WEIGHT: 208 LBS | TEMPERATURE: 97 F | SYSTOLIC BLOOD PRESSURE: 112 MMHG | OXYGEN SATURATION: 93 % | DIASTOLIC BLOOD PRESSURE: 55 MMHG | RESPIRATION RATE: 18 BRPM | HEIGHT: 61 IN | HEART RATE: 75 BPM

## 2025-02-04 LAB
GLUCOSE BLD MANUAL STRIP-MCNC: 129 MG/DL (ref 74–99)
GLUCOSE BLD MANUAL STRIP-MCNC: 231 MG/DL (ref 74–99)

## 2025-02-04 PROCEDURE — 2500000001 HC RX 250 WO HCPCS SELF ADMINISTERED DRUGS (ALT 637 FOR MEDICARE OP): Performed by: STUDENT IN AN ORGANIZED HEALTH CARE EDUCATION/TRAINING PROGRAM

## 2025-02-04 PROCEDURE — 99239 HOSP IP/OBS DSCHRG MGMT >30: CPT | Performed by: STUDENT IN AN ORGANIZED HEALTH CARE EDUCATION/TRAINING PROGRAM

## 2025-02-04 PROCEDURE — 97535 SELF CARE MNGMENT TRAINING: CPT | Mod: GO,CO

## 2025-02-04 PROCEDURE — 2500000002 HC RX 250 W HCPCS SELF ADMINISTERED DRUGS (ALT 637 FOR MEDICARE OP, ALT 636 FOR OP/ED): Performed by: STUDENT IN AN ORGANIZED HEALTH CARE EDUCATION/TRAINING PROGRAM

## 2025-02-04 PROCEDURE — 94660 CPAP INITIATION&MGMT: CPT

## 2025-02-04 PROCEDURE — 97530 THERAPEUTIC ACTIVITIES: CPT | Mod: GO,CO

## 2025-02-04 PROCEDURE — 97530 THERAPEUTIC ACTIVITIES: CPT | Mod: GP,CQ

## 2025-02-04 PROCEDURE — 2500000004 HC RX 250 GENERAL PHARMACY W/ HCPCS (ALT 636 FOR OP/ED): Performed by: STUDENT IN AN ORGANIZED HEALTH CARE EDUCATION/TRAINING PROGRAM

## 2025-02-04 PROCEDURE — 82947 ASSAY GLUCOSE BLOOD QUANT: CPT

## 2025-02-04 PROCEDURE — 2500000005 HC RX 250 GENERAL PHARMACY W/O HCPCS: Performed by: STUDENT IN AN ORGANIZED HEALTH CARE EDUCATION/TRAINING PROGRAM

## 2025-02-04 PROCEDURE — 94640 AIRWAY INHALATION TREATMENT: CPT

## 2025-02-04 RX ADMIN — IPRATROPIUM BROMIDE AND ALBUTEROL SULFATE 3 ML: .5; 3 SOLUTION RESPIRATORY (INHALATION) at 12:38

## 2025-02-04 RX ADMIN — IPRATROPIUM BROMIDE AND ALBUTEROL SULFATE 3 ML: .5; 3 SOLUTION RESPIRATORY (INHALATION) at 07:54

## 2025-02-04 RX ADMIN — LIDOCAINE 4% 1 PATCH: 40 PATCH TOPICAL at 09:00

## 2025-02-04 RX ADMIN — PANTOPRAZOLE SODIUM 40 MG: 40 TABLET, DELAYED RELEASE ORAL at 05:44

## 2025-02-04 RX ADMIN — INSULIN LISPRO 4 UNITS: 100 INJECTION, SOLUTION INTRAVENOUS; SUBCUTANEOUS at 11:58

## 2025-02-04 RX ADMIN — POLYETHYLENE GLYCOL 3350 17 G: 17 POWDER, FOR SOLUTION ORAL at 09:00

## 2025-02-04 RX ADMIN — ENOXAPARIN SODIUM 40 MG: 40 INJECTION SUBCUTANEOUS at 09:00

## 2025-02-04 RX ADMIN — FLUTICASONE FUROATE AND VILANTEROL TRIFENATATE 1 PUFF: 100; 25 POWDER RESPIRATORY (INHALATION) at 09:08

## 2025-02-04 RX ADMIN — Medication 5 L/MIN: at 08:07

## 2025-02-04 RX ADMIN — LISINOPRIL 5 MG: 5 TABLET ORAL at 05:44

## 2025-02-04 RX ADMIN — PREDNISONE 40 MG: 20 TABLET ORAL at 09:00

## 2025-02-04 ASSESSMENT — COGNITIVE AND FUNCTIONAL STATUS - GENERAL
HELP NEEDED FOR BATHING: A LITTLE
MOVING FROM LYING ON BACK TO SITTING ON SIDE OF FLAT BED WITH BEDRAILS: A LITTLE
WALKING IN HOSPITAL ROOM: A LITTLE
DAILY ACTIVITIY SCORE: 17
DRESSING REGULAR LOWER BODY CLOTHING: A LOT
MOBILITY SCORE: 20
STANDING UP FROM CHAIR USING ARMS: A LITTLE
PERSONAL GROOMING: A LITTLE
STANDING UP FROM CHAIR USING ARMS: A LITTLE
PERSONAL GROOMING: A LITTLE
TOILETING: A LITTLE
EATING MEALS: A LITTLE
MOVING TO AND FROM BED TO CHAIR: A LITTLE
TURNING FROM BACK TO SIDE WHILE IN FLAT BAD: A LOT
CLIMB 3 TO 5 STEPS WITH RAILING: TOTAL
DAILY ACTIVITIY SCORE: 20
DRESSING REGULAR UPPER BODY CLOTHING: A LITTLE
CLIMB 3 TO 5 STEPS WITH RAILING: A LITTLE
WALKING IN HOSPITAL ROOM: A LITTLE
DRESSING REGULAR UPPER BODY CLOTHING: A LITTLE
MOBILITY SCORE: 15
MOVING TO AND FROM BED TO CHAIR: A LITTLE
TOILETING: A LITTLE
HELP NEEDED FOR BATHING: A LITTLE

## 2025-02-04 ASSESSMENT — PAIN - FUNCTIONAL ASSESSMENT
PAIN_FUNCTIONAL_ASSESSMENT: 0-10
PAIN_FUNCTIONAL_ASSESSMENT: 0-10

## 2025-02-04 ASSESSMENT — PAIN SCALES - GENERAL
PAINLEVEL_OUTOF10: 0 - NO PAIN
PAINLEVEL_OUTOF10: 0 - NO PAIN

## 2025-02-04 ASSESSMENT — ACTIVITIES OF DAILY LIVING (ADL): HOME_MANAGEMENT_TIME_ENTRY: 12

## 2025-02-04 NOTE — PROGRESS NOTES
Occupational Therapy    OT Treatment    Patient Name: Estrella Melton  MRN: [id number]  Department: [facility]  Room: 1014/1014-A  Today's Date: 2/4/2025  Time Calculation  Start Time: 0934  Stop Time: 0958  Time Calculation (min): 24 min        Assessment:  OT Assessment: Session focused on increasing independence with and ADL tasks and functional mobility/transfers.  Pt demo'd improved balance and safety this date.  End of Session Communication: Bedside nurse  End of Session Patient Position: Up in chair, Alarm on (All needs met, call light within reach)     Plan:  Treatment Interventions: ADL retraining, Functional transfer training, Endurance training, UE strengthening/ROM, Patient/family training, Neuromuscular reeducation, Compensatory technique education, Equipment evaluation/education  OT Frequency: 3 times per week  OT Discharge Recommendations: Moderate intensity level of continued care, High intensity level of continued care  Equipment Recommended upon Discharge: Wheeled walker  OT Recommended Transfer Status: Assist of 1  OT - OK to Discharge: Yes (when medically appropriate)  Treatment Interventions: ADL retraining, Functional transfer training, Endurance training, UE strengthening/ROM, Patient/family training, Neuromuscular reeducation, Compensatory technique education, Equipment evaluation/education    Subjective   Previous Visit Info:  OT Last Visit  OT Received On: 02/04/25  General:  General  Past Medical History Relevant to Rehab: Includes: COPD, HTN, morbid obesity, HLD, OA, DM, L spine DDD, chronic back pain, R NATE, chronic respiratory failure, bowel resection, diverticulosis  Prior to Session Communication: Bedside nurse (cleared for participation in OT tx)  Patient Position Received: Up in chair, Alarm on  General Comment: Agreeable to therapy. Pleasant and cooperative  Precautions:  Medical Precautions: Fall precautions, Oxygen therapy device and L/min (5L NC, pt on 6L on tank when ambulating  throughout room.)      Vital Signs Comment: SpO2 89-91% with activity on 6L; SpO2 93-94% at rest on 5L     Pain:  Pain Assessment  Pain Assessment: 0-10  0-10 (Numeric) Pain Score: 0 - No pain    Objective    Cognition:  Cognition  Overall Cognitive Status: Within Functional Limits  Orientation Level: Oriented X4    Activities of Daily Living: Grooming  Grooming Level of Assistance: Contact guard  Grooming Where Assessed: Standing sinkside  Grooming Comments: Pt stood at sink x 3 1/2 min for completion of G/H tasks.  SBA for balance.  Cues for positioning of walker and body safely at sink    LE Dressing  LE Dressing:  (Don pants from chair level with min assist to thread R LE and SBA for standing components.  Pt educated on reacher for LB dressing.  Will trial next tx session.)    Toileting  Toileting Level of Assistance: Close supervision  Where Assessed: Toilet (use of grab bar for sit<>stand transfer from toilet.  Pt educated on DME recommendation including elevated toilet seat vs bsc frame to increase safety and independence at home.)  Toileting Comments: SBA for dyn standing tasks with completion of hygiene and clothing management without UE support.       Bed Mobility/Transfers: Transfers  Transfer:  (sit to stand x 3 trials from chair/toilet with SBA and fww)      Functional Mobility:  Functional Mobility  Functional Mobility Performed:  (Functional mobility in room between adl tasks with fww and SBA. Cues needed for O2 line management with turns)  Sitting Balance:  Static Sitting Balance  Static Sitting-Comment/Number of Minutes: good  Dynamic Sitting Balance  Dynamic Sitting-Comments: good-  Standing Balance:  Static Standing Balance  Static Standing-Comment/Number of Minutes: fair+  Dynamic Standing Balance  Dynamic Standing-Comments: fair/fair+         Therapy/Activity: Balance/Neuromuscular Re-Education  Balance/Neuromuscular Re-Education Activity Performed:  (Multi-level dynamic reaching (L/R/C/Above  head/Below waist/Across midline) with x0-1UE support. No LOB.)    Outcome Measures:[facility] Daily Activity  Putting on and taking off regular lower body clothing: A lot  Bathing (including washing, rinsing, drying): A little  Putting on and taking off regular upper body clothing: A little  Toileting, which includes using toilet, bedpan or urinal: A little  Taking care of personal grooming such as brushing teeth: A little  Eating Meals: A little  Daily Activity - Total Score: 17        Education Documentation  ADL Training, taught by Jennifer L Felty, OTA at 2/4/2025 10:17 AM.  Learner: Patient  Readiness: Acceptance  Method: Explanation, Demonstration, Teach-back  Response: Verbalizes Understanding  Comment: Pt educated on compensatory adl techniques and AE    EDUCATION:  Education  Individual(s) Educated: Patient  Education Provided:  (Pt educated on fall prevention techniques; safe transfer techniques including hand placement and positioning; techniques/strategies for balance improvement; compensatory adl techniques; safe body mechanics; energy conservation techniques.)  Patient Response to Education: Patient/Caregiver Verbalized Understanding of Information    Goals:  Encounter Problems       Encounter Problems (Active)       OT Goals       Mod I sit/stand, bed/chair/commode with FWW.  (Progressing)       Start:  01/31/25    Expected End:  02/14/25            Mod I ADL functional mobility with FWW.  (Progressing)       Start:  01/31/25    Expected End:  02/14/25            Good dynamic standing balance for ADL.  (Progressing)       Start:  01/31/25    Expected End:  02/14/25            Tolerate 8 mins light functional mobility with stable vitals.  (Progressing)       Start:  01/31/25    Expected End:  02/14/25            Mod I toileting.  (Progressing)       Start:  01/31/25    Expected End:  02/14/25

## 2025-02-04 NOTE — CARE PLAN
Problem: Discharge Planning  Goal: Discharge to home or other facility with appropriate resources  Outcome: Progressing     Problem: Chronic Conditions and Co-morbidities  Goal: Patient's chronic conditions and co-morbidity symptoms are monitored and maintained or improved  Outcome: Progressing     Problem: Nutrition  Goal: Nutrient intake appropriate for maintaining nutritional needs  Outcome: Progressing     Problem: Skin  Goal: Decreased wound size/increased tissue granulation at next dressing change  Outcome: Progressing  Goal: Participates in plan/prevention/treatment measures  Outcome: Progressing  Goal: Prevent/manage excess moisture  Outcome: Progressing  Goal: Prevent/minimize sheer/friction injuries  Outcome: Progressing  Goal: Promote/optimize nutrition  Outcome: Progressing  Goal: Promote skin healing  Outcome: Progressing     Problem: Fall/Injury  Goal: Not fall by end of shift  Outcome: Progressing  Goal: Be free from injury by end of the shift  Outcome: Progressing  Goal: Verbalize understanding of personal risk factors for fall in the hospital  Outcome: Progressing  Goal: Verbalize understanding of risk factor reduction measures to prevent injury from fall in the home  Outcome: Progressing  Goal: Use assistive devices by end of the shift  Outcome: Progressing  Goal: Pace activities to prevent fatigue by end of the shift  Outcome: Progressing     Problem: Diabetes  Goal: Achieve decreasing blood glucose levels by end of shift  Outcome: Progressing  Goal: Increase stability of blood glucose readings by end of shift  Outcome: Progressing  Goal: Maintain electrolyte levels within acceptable range throughout shift  Outcome: Progressing  Goal: Maintain glucose levels >70mg/dl to <250mg/dl throughout shift  Outcome: Progressing  Goal: No changes in neurological exam by end of shift  Outcome: Progressing  Goal: Learn about and adhere to nutrition recommendations by end of shift  Outcome: Progressing  Goal:  Vital signs within normal range for age by end of shift  Outcome: Progressing  Goal: Increase self care and/or family involovement by end of shift  Outcome: Progressing  Goal: Receive DSME education by end of shift  Outcome: Progressing     Problem: Pain  Goal: Takes deep breaths with improved pain control throughout the shift  Outcome: Progressing  Goal: Turns in bed with improved pain control throughout the shift  Outcome: Progressing  Goal: Walks with improved pain control throughout the shift  Outcome: Progressing  Goal: Performs ADL's with improved pain control throughout shift  Outcome: Progressing  Goal: Participates in PT with improved pain control throughout the shift  Outcome: Progressing  Goal: Free from opioid side effects throughout the shift  Outcome: Progressing  Goal: Free from acute confusion related to pain meds throughout the shift  Outcome: Progressing     Problem: Respiratory  Goal: Clear secretions with interventions this shift  Outcome: Progressing  Goal: Minimize anxiety/maximize coping throughout shift  Outcome: Progressing  Goal: Minimal/no exertional discomfort or dyspnea this shift  Outcome: Progressing  Goal: No signs of respiratory distress (eg. Use of accessory muscles. Peds grunting)  Outcome: Progressing  Goal: Patent airway maintained this shift  Outcome: Progressing  Goal: Tolerate mechanical ventilation evidenced by VS/agitation level this shift  Outcome: Progressing  Goal: Verbalize decreased shortness of breath this shift  Outcome: Progressing  Goal: Wean oxygen to maintain O2 saturation per order/standard this shift  Outcome: Progressing  Goal: Increase self care and/or family involvement in next 24 hours  Outcome: Progressing   The patient's goals for the shift include rest    The clinical goals for the shift include Patient will wear BiPap for 5 or more hours this shift

## 2025-02-04 NOTE — PROGRESS NOTES
Physical Therapy    Physical Therapy Treatment    Patient Name: Estrella Melton  MRN: [id number]  Today's Date: 2/4/2025  Time Calculation  Start Time: 0908  Stop Time: 0933  Time Calculation (min): 25 min     1014/1014-A    Assessment/Plan   End of Session Communication: Bedside nurse    Assessment Comment: Progressing well towards goals.  Improved transfers and mobility this date.  She would benefit from continued PT to improve functional mobility.    End of Session Patient Position:  (Patient sitting up in recliner chair after treatment. Call light/tray in reach. Chair alarm on. OT coming in for treatment)    PT Plan  Inpatient/Swing Bed or Outpatient: Inpatient  Treatment/Interventions: Bed mobility, Transfer training, Gait training, Stair training, Balance training, Strengthening, Endurance training, Therapeutic exercise, Therapeutic activity, Home exercise program  PT Plan: Ongoing PT  PT Frequency: 3 times per week  PT Discharge Recommendations: Moderate intensity level of continued care (Pt. lives alone.)  Equipment Recommended upon Discharge: Wheeled walker PT Recommended Transfer Status: Assist x1, Assistive device (ww)    General Visit Information:   PT  Visit  PT Received On: 02/04/25    General  Prior to Session Communication:  (Cleared by RN for PT)  Patient Position Received:  (Patient presents sitting in chair, eager to participate with PT. She reports possible d/c today to SNF)    General Comment:  (Dx: Fall with back pain; (+) Flu))    General Observations:   General Observation:  (5L 02 (6L 02 while on portable tank); purwick (removed and left out per patient request); alarm)      Precautions:  Precautions  Medical Precautions: Fall precautions  Precautions Comment:  (Droplet precautions)      Pain:  Pain Assessment  Pain Assessment: 0-10  0-10 (Numeric) Pain Score: 0 - No pain    Cognition:  Cognition  Overall Cognitive Status:  (pleasant and cooperative; motivated towards goals)  Insight:  Moderate      Balance:   Dynamic Standing Balance  Dynamic Standing-Balance:  (Fair dynamic stand with ww)      Activity Tolerance:  Activity Tolerance  Endurance: Decreased tolerance for upright activites (Sp02 drops with activity)    Therapeutic Exercise  Therapeutic Exercise Performed:  (seated LE ther-ex: AP, LAQ, seated marching, hip add (pillow squeeze) x10)    Balance/Neuromuscular Re-Education  Balance/Neuromuscular Re-Education Activity Performed:  (Patient performed standing multilevel reaching with alternating hands and single UE support on ww. CGAx 1)     Ambulation/Gait Training  Ambulation/Gait Training Performed:  (Patient amb 60' with laura ww and CGAx1.  Slow aidan, decreased step length. No LOB. Sp02 89-90% on 6L after amb. Recovered to 92-94% once sitting with pursed lip breathing.)    Transfers  Transfer:  (sit->stand: SBAx1  Two stands performed from chair level. Patient demos good hand placement and technique.)             Outcome Measures:   [facility] Basic Mobility  Turning from your back to your side while in a flat bed without using bedrails: A little  Moving from lying on your back to sitting on the side of a flat bed without using bedrails: A lot  Moving to and from bed to chair (including a wheelchair): A little  Standing up from a chair using your arms (e.g. wheelchair or bedside chair): A little  To walk in hospital room: A little  Climbing 3-5 steps with railing: Total  Basic Mobility - Total Score: 15          Education Documentation  Mobility Training, taught by Chikis Cervantes PTA at 2/4/2025  2:16 PM.  Learner: Patient  Readiness: Eager  Method: Explanation, Demonstration  Response: Verbalizes Understanding, Needs Reinforcement      Encounter Problems       Encounter Problems (Active)       Impaired mobility        Perform all bed mobility with mod. indep.  (Progressing)       Start:  01/31/25    Expected End:  02/14/25            Perform all transfers with ww and mod. indep.,  managing O2 line indep.   (Progressing)       Start:  01/31/25    Expected End:  02/14/25            Patient will ambulate 50 ft with ww and mod. indep., managing O2 line indep.  (Progressing)       Start:  01/31/25    Expected End:  02/14/25            Patient will ascend/descend 4 steps with HR and SPC, if needed, with CGAx1 (Not Progressing)       Start:  01/31/25    Expected End:  02/14/25            Patient will perform BLE HEP with indep. x 10-20 reps x 1-2 sets  (Progressing)       Start:  01/31/25    Expected End:  02/14/25

## 2025-02-04 NOTE — DISCHARGE SUMMARY
"Discharge Diagnosis  Fall  Acute hypoxic respiratory failure  Difficulty ambulating    Issues Requiring Follow-Up  Outpatient follow-up with primary care as needed    Discharge Meds     Medication List      CONTINUE taking these medications     albuterol 90 mcg/actuation inhaler   Breo Ellipta 100-25 mcg/dose inhaler; Generic drug: fluticasone   furoate-vilanteroL   cyclobenzaprine 10 mg tablet; Commonly known as: Flexeril   diclofenac 50 mg EC tablet; Commonly known as: Voltaren   guaiFENesin 1,200 mg tablet extended release 12hr; Commonly known as:   Mucinex   ipratropium-albuteroL 0.5-2.5 mg/3 mL nebulizer solution; Commonly known   as: Duo-Neb   Lantus Solostar U-100 Insulin 100 unit/mL (3 mL) pen; Generic drug:   insulin glargine; Inject 15 units subcutaneously nightly   * lisinopril 10 mg tablet; Take 1 tablet (10 mg) by mouth once daily.   * lisinopril 5 mg tablet   metFORMIN 1,000 mg tablet; Commonly known as: Glucophage; Take 1 tablet   (1,000 mg) by mouth 2 times daily (morning and late afternoon).   oxyCODONE-acetaminophen 7.5-325 mg tablet; Commonly known as: Percocet   pen needle, diabetic 31 gauge x 3/16\" needle; Commonly known as: Pen   Needle; Use 1 pen needle to inject insulin nightly.   simvastatin 40 mg tablet; Commonly known as: Zocor; Take 1 tablet (40   mg) by mouth once daily at bedtime.  * This list has 2 medication(s) that are the same as other medications   prescribed for you. Read the directions carefully, and ask your doctor or   other care provider to review them with you.       Test Results Pending At Discharge  Pending Labs       No current pending labs.            Hospital Course  Esterlla Melton is a 70 y.o. female PMHx: Chronic respiratory failure, COPD, former smoker, hypertension, HLD, type 2 diabetes on insulin who presented status post mechanical fall, with difficulty ambulating, and general malaise and increased shortness of breath.     Once on the floor she was placed on oxygen " and bronchodilator however she was found lethargic and confused.  Rapid response was activated and ICU team present at bedside and patient was placed on BiPAP and was responsive.  ABG was showing acute on chronic respiratory acidosis.  Patient was transferred to ICU for higher level of care.  She was placed on AVAPS mask and BiPAP.  She was responsive very well to BiPAP.  Eventually she was placed on nasal cannula and transferred out of the ICU to the regular medical floor.  Back to the floor she was continued on oxygen therapy five 6 L.  However she remained dependent on BiPAP only overnight.  She was continued on prednisone, Tamiflu, DuoNeb, Breo Ellipta.  Also for diabetes she was continued on a sliding scale, hypoglycemia protocol, POC glucose, DM diet.  She was continued on the rest of her home medication.  Every day patient was improving and eventually she was down to 3 to 4 L oxygen via nasal cannula.  She was also evaluated by physical therapy who recommended SNF placement for her due to need for oxygen and BiPAP overnight.  VTE prophylaxis was provided with Lovenox subcu.    Currently patient is hemodynamically stable and ready for discharge to SNF today on oxygen and rest of her home medication.  If she needs BiPAP overnight she will be on it at the facility.  She will follow-up as outpatient with her primary care as needed for further recommendation and medication adjustment.  Patient is hemodynamically stable on the day of discharge.      Pertinent Physical Exam At Time of Discharge  Physical Exam    General: Well-developed obese female, no acute distress, on oxygen 4 L via NC  HEENT: AT, NC, no JVD, no lymphadenopathy, neck supple  Lungs: No wheezing, no crackles  Cardiac: Normal S1-S2, no murmur, no gallop  Abdomen: Soft, nontender, no distention, positive bowel sound  Extremities: No deformity, no edema, pulses intact, ROM intact  Neurological: Alert awake oriented x3, sensation intact, clear  speech        Time spent 35 minutes  Jose Lobo MD

## 2025-02-04 NOTE — PROGRESS NOTES
I plan is for [facility] nr snf, ns. Precert has been obtained.  Tcc-socrates and physiain aware, will await final discharge orders.

## 2025-02-04 NOTE — CARE PLAN
The patient's goals for the shift include safety    The clinical goals for the shift include maintainn O2 safe levels

## 2025-02-04 NOTE — PROGRESS NOTES
Patient to discharge to [facility] SNF for rehab today, patient aware and in agreement with transfer. CT team will continue to follow.

## 2025-02-05 ENCOUNTER — NURSING HOME VISIT (OUTPATIENT)
Dept: POST ACUTE CARE | Facility: EXTERNAL LOCATION | Age: 70
End: 2025-02-05
Payer: MEDICARE

## 2025-02-05 DIAGNOSIS — S22.030D COMPRESSION FRACTURE OF T3 VERTEBRA WITH ROUTINE HEALING: ICD-10-CM

## 2025-02-05 DIAGNOSIS — J10.1 INFLUENZA A: ICD-10-CM

## 2025-02-05 DIAGNOSIS — J96.02 ACUTE RESPIRATORY FAILURE WITH HYPERCAPNIA (MULTI): ICD-10-CM

## 2025-02-05 DIAGNOSIS — M19.91 PRIMARY OSTEOARTHRITIS, UNSPECIFIED SITE: ICD-10-CM

## 2025-02-05 DIAGNOSIS — E11.9 TYPE 2 DIABETES MELLITUS WITHOUT COMPLICATION, WITH LONG-TERM CURRENT USE OF INSULIN (MULTI): ICD-10-CM

## 2025-02-05 DIAGNOSIS — Z79.4 TYPE 2 DIABETES MELLITUS WITHOUT COMPLICATION, WITH LONG-TERM CURRENT USE OF INSULIN (MULTI): ICD-10-CM

## 2025-02-05 DIAGNOSIS — J43.1 PANLOBULAR EMPHYSEMA (MULTI): Primary | ICD-10-CM

## 2025-02-05 PROCEDURE — 99306 1ST NF CARE HIGH MDM 50: CPT | Performed by: INTERNAL MEDICINE

## 2025-02-05 NOTE — LETTER
Patient: Estrella Melton  : 1955    Encounter Date: 2025    Subjective  Patient ID: Estrella Melton is a 70 y.o. female who is acute skilled care and presents for initial visit for skilled nursing.    70-year-old female patient has been seen by outside physician has been admitted because she was having fatigue, weakness, shortness of breath, she was found to have influenza A, during hospitalization patient has a compromise oxygenation, patient was lethargic, patient was found to have a significant carbon dioxide retention, patient was kept on BiPAP, patient stating medical intensive care unit, oxygenation was stabilized, continue to require positive airway pressure, patient remains on nasal oxygen during daytime, mentation improved, she is morbidly obese female patient, she has a history of smoking, her last hemoglobin A1c reported was in  here in the medical record which was normal.  Patient has a significant CO2 retention causing metabolic alkalosis.  Influenza has been treated with Tamiflu, there was no pulmonary infiltrate, there was a peribronchial infiltrate, that is not atypical with a viral infection.  Because of the nature and severity of the problem and having a ICU stay patient is admitted here for skilled nursing and rehabilitation.  As I see this patient patient was comfortable heavyset sitting upright has a nasal oxygen on, all other hospitalization related data and information were reviewed, there was a T3 compression deformity, patient has a frequent falls at home, patient has been kept at 4 L of nasal oxygen and BiPAP at 10/22 level.  She seems to be doing well otherwise, influenza has been subsided I believe, patient can be taken off from isolation.         Review of Systems   Constitutional:  Positive for activity change and fatigue.   HENT:  Positive for congestion.    Respiratory:  Positive for cough and shortness of breath. Negative for apnea and wheezing.    Cardiovascular:  Positive  for leg swelling.   Gastrointestinal:  Negative for abdominal pain, nausea and vomiting.   Musculoskeletal:  Positive for arthralgias and back pain.   Neurological:  Positive for weakness.   Psychiatric/Behavioral:  Negative for agitation, behavioral problems and confusion.        Objective  /78   Pulse 78     Physical Exam  Constitutional:       Appearance: Normal appearance. She is obese.   HENT:      Head: Normocephalic.      Nose: Nose normal.   Eyes:      Conjunctiva/sclera: Conjunctivae normal.   Cardiovascular:      Rate and Rhythm: Normal rate and regular rhythm.      Heart sounds: Normal heart sounds.   Pulmonary:      Breath sounds: Normal breath sounds. No wheezing or rales.   Abdominal:      General: Abdomen is protuberant.      Palpations: Abdomen is soft.   Musculoskeletal:         General: Deformity present. No tenderness.      Cervical back: Neck supple.   Skin:     General: Skin is warm and dry.      Findings: Bruising present.   Neurological:      Mental Status: She is oriented to person, place, and time. Mental status is at baseline.   Psychiatric:         Behavior: Behavior normal.         Assessment/Plan  Problem List Items Addressed This Visit             ICD-10-CM    Type 2 diabetes mellitus without complication, with long-term current use of insulin (Multi) E11.9, Z79.4    Chronic obstructive lung disease (Multi) - Primary J44.9    Osteoarthritis M19.90    Compression fracture of T3 vertebra with routine healing S22.030D     Other Visit Diagnoses         Codes    Acute respiratory failure with hypercapnia (Multi)     J96.02    Influenza A     J10.1        Patient was evaluated, medications include albuterol, topical ammonium lactate, Breo, diclofenac as needed, guaifenesin, nebulizer, Lantus 15 units, lisinopril 15 mg, metformin 2 g, oxycodone as needed and simvastatin.  She should be doing well, mentation has been stable, physical therapy, Occupational Therapy evaluation are in  process, other routine safety precautions has to be taken as per the facility protocol.  Periodic assessment and follow-up will be done, no major events expected.  Short-term stay is expected, blood sugars will be monitored, adjustment of therapy was done, compression fracture is not causing intractable pain and discomfort.  Respiratory failure is compromised and compensated.  Osteoarthritic pain and discomfort has been causing limited mobility.  Smoking cessation has been discussed.  There is no obvious lethargic, susceptible for CO2 retention, periodic assessment and follow-up will be done, risk of hospitalization is about 80%.  Will require very close follow-up once discharged from the facility.  Nursing staff will notify us if there is any significant change in the clinical condition.     Goals    None           Electronically Signed By: Luis Alfredo Giles MD   2/6/25  7:37 PM

## 2025-02-05 NOTE — DOCUMENTATION CLARIFICATION NOTE
"    PATIENT:               LANE RODRÍGUEZ  ACCT #:                  [id number]  MRN:                       [id number]  :                       1955  ADMIT DATE:       2025 11:21 AM  DISCH DATE:        2025 2:18 PM  RESPONDING PROVIDER #:        [id number]          PROVIDER RESPONSE TEXT:    Metabolic encephalopathy 2/2 hypoxemia    CDI QUERY TEXT:    Clarification    Instruction:    Based on your assessment of the patient and the clinical information, please provide the requested documentation by clicking on the appropriate radio button and enter any additional information if prompted.    Question: Please further clarify the most likely etiology of the altered mental status as    When answering this query, please exercise your independent professional judgment. The fact that a question is being asked, does not imply that any particular answer is desired or expected.    The patient's clinical indicators include:  Clinical Information: 70 y.o. presents after fall at home found to have new T3 compression fracture and influenza A    Clinical Indicators:  ED provider note \"Awake and alert upon arrival to the ED\" \"She is alert and oriented to person, place, and time\"     Critical Care \"The patient was on IV steroids and scheduled bronchodilator when she became more lethargic and confused. She was placed on BiPAP support. This morning the patient's lethargy was only minimally improved\"     Critical Care \"She is alert and oriented to person, place, and time. Mental status is at baseline.\"    2/2 \" more oriented and alert\" \"she was hypoxic down to 80s and was placed back on BiPAP for 2 hours and eventually was switched to Ventimask with 15 L oxygen.\"    Treatment: CXR,  O2 5L, O2 6L,  BiPAP FIO2 60%,  Ventimask FIO2 40%,   Tamiflu 75mg  PO 2x daily    Risk Factors: COPD, Influenza A  Options provided:  -- Metabolic encephalopathy 2/2 hypoxemia  -- Other - I will add my own diagnosis  -- Refer to Clinical " Documentation Reviewer    Query created by: Abbey Saldana on 2/3/2025 2:29 PM      Electronically signed by:  URSZULA SAMUELS MD 2/5/2025 9:02 AM

## 2025-02-06 VITALS — SYSTOLIC BLOOD PRESSURE: 143 MMHG | HEART RATE: 78 BPM | DIASTOLIC BLOOD PRESSURE: 78 MMHG

## 2025-02-06 PROBLEM — Z79.4 TYPE 2 DIABETES MELLITUS WITHOUT COMPLICATION, WITH LONG-TERM CURRENT USE OF INSULIN (MULTI): Status: ACTIVE | Noted: 2023-10-24

## 2025-02-06 PROBLEM — S22.030D COMPRESSION FRACTURE OF T3 VERTEBRA WITH ROUTINE HEALING: Status: ACTIVE | Noted: 2025-02-06

## 2025-02-06 ASSESSMENT — ENCOUNTER SYMPTOMS
APNEA: 0
ARTHRALGIAS: 1
FATIGUE: 1
AGITATION: 0
COUGH: 1
SHORTNESS OF BREATH: 1
ABDOMINAL PAIN: 0
ACTIVITY CHANGE: 1
CONFUSION: 0
NAUSEA: 0
VOMITING: 0
WHEEZING: 0
BACK PAIN: 1
WEAKNESS: 1

## 2025-02-07 NOTE — PROGRESS NOTES
Subjective   Patient ID: Estrella Melton is a 70 y.o. female who is acute skilled care and presents for initial visit for skilled nursing.    70-year-old female patient has been seen by outside physician has been admitted because she was having fatigue, weakness, shortness of breath, she was found to have influenza A, during hospitalization patient has a compromise oxygenation, patient was lethargic, patient was found to have a significant carbon dioxide retention, patient was kept on BiPAP, patient stating medical intensive care unit, oxygenation was stabilized, continue to require positive airway pressure, patient remains on nasal oxygen during daytime, mentation improved, she is morbidly obese female patient, she has a history of smoking, her last hemoglobin A1c reported was in 2023 here in the medical record which was normal.  Patient has a significant CO2 retention causing metabolic alkalosis.  Influenza has been treated with Tamiflu, there was no pulmonary infiltrate, there was a peribronchial infiltrate, that is not atypical with a viral infection.  Because of the nature and severity of the problem and having a ICU stay patient is admitted here for skilled nursing and rehabilitation.  As I see this patient patient was comfortable heavyset sitting upright has a nasal oxygen on, all other hospitalization related data and information were reviewed, there was a T3 compression deformity, patient has a frequent falls at home, patient has been kept at 4 L of nasal oxygen and BiPAP at 10/22 level.  She seems to be doing well otherwise, influenza has been subsided I believe, patient can be taken off from isolation.         Review of Systems   Constitutional:  Positive for activity change and fatigue.   HENT:  Positive for congestion.    Respiratory:  Positive for cough and shortness of breath. Negative for apnea and wheezing.    Cardiovascular:  Positive for leg swelling.   Gastrointestinal:  Negative for abdominal pain,  nausea and vomiting.   Musculoskeletal:  Positive for arthralgias and back pain.   Neurological:  Positive for weakness.   Psychiatric/Behavioral:  Negative for agitation, behavioral problems and confusion.        Objective   /78   Pulse 78     Physical Exam  Constitutional:       Appearance: Normal appearance. She is obese.   HENT:      Head: Normocephalic.      Nose: Nose normal.   Eyes:      Conjunctiva/sclera: Conjunctivae normal.   Cardiovascular:      Rate and Rhythm: Normal rate and regular rhythm.      Heart sounds: Normal heart sounds.   Pulmonary:      Breath sounds: Normal breath sounds. No wheezing or rales.   Abdominal:      General: Abdomen is protuberant.      Palpations: Abdomen is soft.   Musculoskeletal:         General: Deformity present. No tenderness.      Cervical back: Neck supple.   Skin:     General: Skin is warm and dry.      Findings: Bruising present.   Neurological:      Mental Status: She is oriented to person, place, and time. Mental status is at baseline.   Psychiatric:         Behavior: Behavior normal.         Assessment/Plan   Problem List Items Addressed This Visit             ICD-10-CM    Type 2 diabetes mellitus without complication, with long-term current use of insulin (Multi) E11.9, Z79.4    Chronic obstructive lung disease (Multi) - Primary J44.9    Osteoarthritis M19.90    Compression fracture of T3 vertebra with routine healing S22.030D     Other Visit Diagnoses         Codes    Acute respiratory failure with hypercapnia (Multi)     J96.02    Influenza A     J10.1        Patient was evaluated, medications include albuterol, topical ammonium lactate, Breo, diclofenac as needed, guaifenesin, nebulizer, Lantus 15 units, lisinopril 15 mg, metformin 2 g, oxycodone as needed and simvastatin.  She should be doing well, mentation has been stable, physical therapy, Occupational Therapy evaluation are in process, other routine safety precautions has to be taken as per the  facility protocol.  Periodic assessment and follow-up will be done, no major events expected.  Short-term stay is expected, blood sugars will be monitored, adjustment of therapy was done, compression fracture is not causing intractable pain and discomfort.  Respiratory failure is compromised and compensated.  Osteoarthritic pain and discomfort has been causing limited mobility.  Smoking cessation has been discussed.  There is no obvious lethargic, susceptible for CO2 retention, periodic assessment and follow-up will be done, risk of hospitalization is about 80%.  Will require very close follow-up once discharged from the facility.  Nursing staff will notify us if there is any significant change in the clinical condition.     Goals    None

## 2025-02-19 ENCOUNTER — OFFICE VISIT (OUTPATIENT)
Dept: PRIMARY CARE CLINIC | Age: 70
End: 2025-02-19

## 2025-02-19 DIAGNOSIS — E11.21 DIABETIC NEPHROPATHY ASSOCIATED WITH TYPE 2 DIABETES MELLITUS (HCC): ICD-10-CM

## 2025-02-19 DIAGNOSIS — E11.9 TYPE 2 DIABETES MELLITUS WITHOUT COMPLICATION, WITHOUT LONG-TERM CURRENT USE OF INSULIN (HCC): ICD-10-CM

## 2025-02-19 DIAGNOSIS — Z00.00 MEDICARE ANNUAL WELLNESS VISIT, SUBSEQUENT: Primary | ICD-10-CM

## 2025-02-19 DIAGNOSIS — I10 HYPERTENSION, UNSPECIFIED TYPE: ICD-10-CM

## 2025-02-19 DIAGNOSIS — E78.5 HYPERLIPIDEMIA, UNSPECIFIED HYPERLIPIDEMIA TYPE: ICD-10-CM

## 2025-02-19 DIAGNOSIS — J44.9 CHRONIC OBSTRUCTIVE PULMONARY DISEASE, UNSPECIFIED COPD TYPE (HCC): ICD-10-CM

## 2025-02-19 RX ORDER — IPRATROPIUM BROMIDE AND ALBUTEROL SULFATE 2.5; .5 MG/3ML; MG/3ML
1 SOLUTION RESPIRATORY (INHALATION) EVERY 4 HOURS PRN
Qty: 360 ML | Refills: 11 | Status: SHIPPED | OUTPATIENT
Start: 2025-02-19

## 2025-02-19 RX ORDER — LISINOPRIL 5 MG/1
5 TABLET ORAL DAILY
Qty: 90 TABLET | Refills: 3 | Status: SHIPPED | OUTPATIENT
Start: 2025-02-19

## 2025-02-19 RX ORDER — SIMVASTATIN 40 MG
40 TABLET ORAL NIGHTLY
Qty: 90 TABLET | Refills: 3 | Status: SHIPPED | OUTPATIENT
Start: 2025-02-19

## 2025-02-19 RX ORDER — ALBUTEROL SULFATE 90 UG/1
2 INHALANT RESPIRATORY (INHALATION) EVERY 6 HOURS PRN
Qty: 6.7 G | Refills: 3 | Status: SHIPPED | OUTPATIENT
Start: 2025-02-19

## 2025-02-19 RX ORDER — FLUTICASONE FUROATE AND VILANTEROL 100; 25 UG/1; UG/1
1 POWDER RESPIRATORY (INHALATION) DAILY
Qty: 60 EACH | Refills: 11 | Status: SHIPPED | OUTPATIENT
Start: 2025-02-19

## 2025-02-19 RX ORDER — CYCLOBENZAPRINE HCL 10 MG
10 TABLET ORAL 2 TIMES DAILY PRN
COMMUNITY
Start: 2025-02-10

## 2025-02-19 SDOH — ECONOMIC STABILITY: FOOD INSECURITY: WITHIN THE PAST 12 MONTHS, THE FOOD YOU BOUGHT JUST DIDN'T LAST AND YOU DIDN'T HAVE MONEY TO GET MORE.: NEVER TRUE

## 2025-02-19 SDOH — ECONOMIC STABILITY: FOOD INSECURITY: WITHIN THE PAST 12 MONTHS, YOU WORRIED THAT YOUR FOOD WOULD RUN OUT BEFORE YOU GOT MONEY TO BUY MORE.: NEVER TRUE

## 2025-02-19 ASSESSMENT — PATIENT HEALTH QUESTIONNAIRE - PHQ9
SUM OF ALL RESPONSES TO PHQ9 QUESTIONS 1 & 2: 0
SUM OF ALL RESPONSES TO PHQ QUESTIONS 1-9: 0
1. LITTLE INTEREST OR PLEASURE IN DOING THINGS: NOT AT ALL
2. FEELING DOWN, DEPRESSED OR HOPELESS: NOT AT ALL
SUM OF ALL RESPONSES TO PHQ QUESTIONS 1-9: 0

## 2025-02-19 NOTE — PROGRESS NOTES
Medicare Annual Wellness Visit    Mary Soliz is here for Medicare AWV (Need portable oxygen )    Assessment & Plan   Medicare annual wellness visit, subsequent  Type 2 diabetes mellitus without complication, without long-term current use of insulin (HCC)  Assessment & Plan:  Chronic; stable   - 7.8 A1c in August 2024   - needs to come in person to have repeated ; patient agreeable   Orders:  -     metFORMIN (GLUCOPHAGE) 1000 MG tablet; Take 1 tablet by mouth 2 times daily, Disp-180 tablet, R-3Normal  -     lisinopril (PRINIVIL;ZESTRIL) 5 MG tablet; Take 1 tablet by mouth daily, Disp-90 tablet, R-3Normal  -     simvastatin (ZOCOR) 40 MG tablet; Take 1 tablet by mouth nightly, Disp-90 tablet, R-3Normal  Diabetic nephropathy associated with type 2 diabetes mellitus (HCC)  -     lisinopril (PRINIVIL;ZESTRIL) 5 MG tablet; Take 1 tablet by mouth daily, Disp-90 tablet, R-3Normal  Hypertension, unspecified type  -     lisinopril (PRINIVIL;ZESTRIL) 5 MG tablet; Take 1 tablet by mouth daily, Disp-90 tablet, R-3Normal  Hyperlipidemia, unspecified hyperlipidemia type  -     simvastatin (ZOCOR) 40 MG tablet; Take 1 tablet by mouth nightly, Disp-90 tablet, R-3Normal  Chronic obstructive pulmonary disease, unspecified COPD type ([facility])  Comments:  due toBreo nonocmpliance  Assessment & Plan:  Chronic; stable   - on continuous O2 3L continuous   - will refer to pulm to manage     Orders:  -     albuterol sulfate HFA (PROVENTIL;VENTOLIN;PROAIR) 108 (90 Base) MCG/ACT inhaler; Inhale 2 puffs into the lungs every 6 hours as needed for Wheezing, Disp-6.7 g, R-3Normal  -     ipratropium 0.5 mg-albuterol 2.5 mg (DUONEB) 0.5-2.5 (3) MG/3ML SOLN nebulizer solution; Take 3 mLs by nebulization every 4 hours as needed for Shortness of Breath, Disp-360 mL, R-11Normal  -     fluticasone furoate-vilanterol (BREO ELLIPTA) 100-25 MCG/ACT inhaler; Inhale 1 puff into the lungs daily, Disp-60 each, R-11Normal  -     Ambulatory referral to

## 2025-02-19 NOTE — ASSESSMENT & PLAN NOTE
Chronic; stable   - 7.8 A1c in August 2024   - needs to come in person to have repeated ; patient agreeable

## 2025-02-25 LAB
ATRIAL RATE: 110 BPM
P AXIS: 73 DEGREES
P OFFSET: 198 MS
P ONSET: 148 MS
PR INTERVAL: 158 MS
Q ONSET: 227 MS
QRS COUNT: 18 BEATS
QRS DURATION: 78 MS
QT INTERVAL: 334 MS
QTC CALCULATION(BAZETT): 452 MS
QTC FREDERICIA: 409 MS
R AXIS: 72 DEGREES
T AXIS: 72 DEGREES
T OFFSET: 394 MS
VENTRICULAR RATE: 110 BPM

## 2025-03-05 ENCOUNTER — OFFICE VISIT (OUTPATIENT)
Dept: PRIMARY CARE CLINIC | Age: 70
End: 2025-03-05
Payer: MEDICARE

## 2025-03-05 VITALS
DIASTOLIC BLOOD PRESSURE: 58 MMHG | BODY MASS INDEX: 37.92 KG/M2 | HEIGHT: 63 IN | HEART RATE: 114 BPM | SYSTOLIC BLOOD PRESSURE: 110 MMHG | OXYGEN SATURATION: 91 % | WEIGHT: 214 LBS

## 2025-03-05 DIAGNOSIS — R09.02 HYPOXIA: ICD-10-CM

## 2025-03-05 DIAGNOSIS — J44.9 CHRONIC OBSTRUCTIVE PULMONARY DISEASE, UNSPECIFIED COPD TYPE (HCC): ICD-10-CM

## 2025-03-05 DIAGNOSIS — J00 ACUTE NASOPHARYNGITIS: Primary | ICD-10-CM

## 2025-03-05 PROCEDURE — 99213 OFFICE O/P EST LOW 20 MIN: CPT | Performed by: INTERNAL MEDICINE

## 2025-03-05 PROCEDURE — 1159F MED LIST DOCD IN RCRD: CPT | Performed by: INTERNAL MEDICINE

## 2025-03-05 PROCEDURE — 1123F ACP DISCUSS/DSCN MKR DOCD: CPT | Performed by: INTERNAL MEDICINE

## 2025-03-05 RX ORDER — AZITHROMYCIN 250 MG/1
TABLET, FILM COATED ORAL
Qty: 6 TABLET | Refills: 1 | Status: SHIPPED | OUTPATIENT
Start: 2025-03-05 | End: 2025-03-15

## 2025-03-05 NOTE — PROGRESS NOTES
Mary Soliz 70 y.o. female presents today with   Chief Complaint   Patient presents with    Follow-up     Walk test for portable O2. Pt currently on 3 L home oxygen       HPI  in Jan. Fell with   Copd on oxygen to get check. Presently he has trouble moving around with big o2 tank.  Past Medical History:   Diagnosis Date    Arthritis     CAD (coronary artery disease)     2 stent 2008 [facility]    COPD (chronic obstructive pulmonary disease) (HCC)     Diverticulitis     Heart palpitations 10/12/2017    Hypoxia     Smoker     quit jan 2025    Type II or unspecified type diabetes mellitus without mention of complication, not stated as uncontrolled      Patient Active Problem List    Diagnosis Date Noted    Morbidly obese 08/11/2020    Adenomatous polyp of ascending colon     Heart palpitations 10/12/2017    Chronic obstructive lung disease (HCC)     Type 2 diabetes mellitus without complication, without long-term current use of insulin (HCC)     Hyperlipidemia 08/29/2013    Coronary arteriosclerosis 08/27/2013    Osteoarthritis 08/20/2013     Past Surgical History:   Procedure Laterality Date    COLON SURGERY      from diverticulitis    COLONOSCOPY N/A 8/20/2019    COLORECTAL CANCER SCREENING, NOT HIGH RISK performed by Prashant Aguilar MD at [facility]    CORONARY ANGIOPLASTY WITH STENT PLACEMENT  2003    HERNIA REPAIR      HYSTERECTOMY (CERVIX STATUS UNKNOWN)  age 41    total    OVARY REMOVAL      TONSILLECTOMY      TOTAL HIP ARTHROPLASTY       Family History   Problem Relation Age of Onset    Coronary Art Dis Mother     Cancer Father      Social History     Socioeconomic History    Marital status:      Spouse name: None    Number of children: None    Years of education: None    Highest education level: None   Tobacco Use    Smoking status: Every Day     Current packs/day: 0.00     Average packs/day: 1 pack/day for 30.0 years (30.0 ttl pk-yrs)     Types: E-Cigarettes, Cigarettes     Start date:

## 2025-03-05 NOTE — PROGRESS NOTES
Pulse at rest without oxygen -91%    -with oxygen 96%    Ambulating without oxygen 88% (after 1 minute became SOB/dizzy)   - with oxygen 93% ( 3 minutes before pt become tired)

## 2025-03-07 ENCOUNTER — TELEPHONE (OUTPATIENT)
Dept: PRIMARY CARE CLINIC | Age: 70
End: 2025-03-07

## 2025-03-07 ASSESSMENT — ENCOUNTER SYMPTOMS
BLOOD IN STOOL: 0
APNEA: 0
ABDOMINAL DISTENTION: 0
PHOTOPHOBIA: 0
FACIAL SWELLING: 0
CHOKING: 0

## 2025-03-07 NOTE — TELEPHONE ENCOUNTER
Pt needs her portable oxygen resent with a new script that includes    - Name  -   - Current Diagnosis  - says \"Port Oxygen Concentrator\"  - Pulse settings on scale of 1-3  - says \"Via Nasal Cannula\"  - Dr. Aguirre's signature and NPI number

## 2025-03-21 ENCOUNTER — TELEPHONE (OUTPATIENT)
Dept: PRIMARY CARE CLINIC | Age: 70
End: 2025-03-21

## 2025-04-02 ENCOUNTER — TELEPHONE (OUTPATIENT)
Dept: PRIMARY CARE CLINIC | Age: 70
End: 2025-04-02

## 2025-04-17 DIAGNOSIS — E11.9 TYPE 2 DIABETES MELLITUS WITHOUT COMPLICATION, WITHOUT LONG-TERM CURRENT USE OF INSULIN: ICD-10-CM

## 2025-04-18 RX ORDER — BLOOD SUGAR DIAGNOSTIC
STRIP MISCELLANEOUS
Qty: 200 STRIP | Refills: 10 | Status: SHIPPED | OUTPATIENT
Start: 2025-04-18

## 2025-06-03 ENCOUNTER — TELEPHONE (OUTPATIENT)
Dept: PRIMARY CARE CLINIC | Age: 70
End: 2025-06-03

## 2025-06-03 NOTE — TELEPHONE ENCOUNTER
Andrew calling just to let provider know that the Health  risk assessment and care plan are now available in the provider portal

## 2025-08-20 DIAGNOSIS — J44.9 CHRONIC OBSTRUCTIVE PULMONARY DISEASE, UNSPECIFIED COPD TYPE (HCC): ICD-10-CM

## 2025-08-20 DIAGNOSIS — G89.29 CHRONIC BACK PAIN, UNSPECIFIED BACK LOCATION, UNSPECIFIED BACK PAIN LATERALITY: ICD-10-CM

## 2025-08-20 DIAGNOSIS — E11.9 TYPE 2 DIABETES MELLITUS WITHOUT COMPLICATION, WITHOUT LONG-TERM CURRENT USE OF INSULIN (HCC): ICD-10-CM

## 2025-08-20 DIAGNOSIS — M54.9 CHRONIC BACK PAIN, UNSPECIFIED BACK LOCATION, UNSPECIFIED BACK PAIN LATERALITY: ICD-10-CM

## 2025-08-20 RX ORDER — CYCLOBENZAPRINE HCL 5 MG
TABLET ORAL
Qty: 60 TABLET | Refills: 1 | Status: SHIPPED | OUTPATIENT
Start: 2025-08-20

## 2025-08-20 RX ORDER — FLUTICASONE FUROATE AND VILANTEROL TRIFENATATE 100; 25 UG/1; UG/1
1 POWDER RESPIRATORY (INHALATION) DAILY
Qty: 60 EACH | Refills: 11 | Status: SHIPPED | OUTPATIENT
Start: 2025-08-20

## 2025-08-20 RX ORDER — INSULIN GLARGINE 100 [IU]/ML
INJECTION, SOLUTION SUBCUTANEOUS
Qty: 75 ML | Refills: 5 | Status: SHIPPED | OUTPATIENT
Start: 2025-08-20